# Patient Record
Sex: FEMALE | Race: WHITE | Employment: OTHER | URBAN - METROPOLITAN AREA
[De-identification: names, ages, dates, MRNs, and addresses within clinical notes are randomized per-mention and may not be internally consistent; named-entity substitution may affect disease eponyms.]

---

## 2017-05-14 ENCOUNTER — OFFICE VISIT (OUTPATIENT)
Dept: URGENT CARE | Facility: RETAIL CLINIC | Age: 52
End: 2017-05-14
Payer: COMMERCIAL

## 2017-05-14 ENCOUNTER — TELEPHONE (OUTPATIENT)
Dept: NURSING | Facility: CLINIC | Age: 52
End: 2017-05-14

## 2017-05-14 ENCOUNTER — TELEPHONE (OUTPATIENT)
Dept: FAMILY MEDICINE | Facility: CLINIC | Age: 52
End: 2017-05-14

## 2017-05-14 VITALS
DIASTOLIC BLOOD PRESSURE: 105 MMHG | OXYGEN SATURATION: 100 % | SYSTOLIC BLOOD PRESSURE: 149 MMHG | TEMPERATURE: 98.7 F | HEART RATE: 90 BPM

## 2017-05-14 DIAGNOSIS — J20.9 ACUTE BRONCHITIS WITH COEXISTING CONDITION REQUIRING PROPHYLACTIC TREATMENT: Primary | ICD-10-CM

## 2017-05-14 PROCEDURE — 99213 OFFICE O/P EST LOW 20 MIN: CPT | Performed by: PHYSICIAN ASSISTANT

## 2017-05-14 RX ORDER — CEFDINIR 300 MG/1
600 CAPSULE ORAL DAILY
Qty: 20 CAPSULE | Refills: 0 | Status: SHIPPED | OUTPATIENT
Start: 2017-05-14 | End: 2017-05-24

## 2017-05-14 NOTE — PROGRESS NOTES
Chief Complaint   Patient presents with     Ent Problem     plugged; pt is      Cough     tight chest     Nasal Congestion     stuffy        SUBJECTIVE:  Lorna Gorman is a 51 year old female who presents to the clinic today with a chief complaint of congestion and cough  for 5 day(s).  Her cough is described as persistent, daytime and nightime.    The patient's symptoms are moderate and worsening.  Associated symptoms include ears plugged, congestion, drainage, coughing The patient's symptoms are exacerbated by no particular triggers  Patient has been using sudafed behind pharmacy counter, ibuprofen, mucinex  to improve symptoms.    Past Medical History:   Diagnosis Date     GENERALIZED ANXIETY DIS 5/15/2007     Hyperlipidemia with target LDL less than 130 11/6/2013     Major depressive disorder, recurrent episode, mild (H) 12/9/2015     Migraine, unspecified, without mention of intractable migraine without mention of status migrainosus      Moderate Depression [296.32] 8/19/2009     Premenstrual tension syndromes     PMDS on [place] and doing very well.     Tobacco abuse 11/6/2013     Current Outpatient Prescriptions   Medication Sig Dispense Refill     sertraline (ZOLOFT) 50 MG tablet TAKE 0.5 TABLETS (25 MG) BY MOUTH DAILY (Patient not taking: Reported on 5/14/2017) 15 tablet 0     ASPIRIN PO Take 325 mg by mouth Reported on 5/14/2017       albuterol (PROAIR HFA, PROVENTIL HFA, VENTOLIN HFA) 108 (90 BASE) MCG/ACT inhaler Inhale 2 puffs into the lungs every 4 hours as needed (Patient not taking: Reported on 5/14/2017) 1 Inhaler 1        Allergies   Allergen Reactions     No Known Allergies      Ragweeds         History   Smoking Status     Current Every Day Smoker     Packs/day: 0.25     Years: 10.00     Last attempt to quit: 11/25/2015   Smokeless Tobacco     Never Used       ROS  Review of systems negative except as stated above.    OBJECTIVE:  BP (!) 149/105 (BP Location: Left arm)  Pulse 90   Temp 98.7  F (37.1  C) (Oral)  SpO2 100%  GENERAL APPEARANCE: mildly ill appearing  EYES: conjunctiva clear  HENT: ear canals clear. Bilateral TMs serous effusion  Nose congested. mouth without ulcers, erythema or lesions  NECK: supple, nontender, no lymphadenopathy  RESP:exp rhonchi present in upper lobes, no wheezing or rales  CV: regular rates and rhythm, normal S1 S2, no murmur noted  SKIN: no suspicious lesions or rashes    ASSESSMENT:    (J20.9) Acute bronchitis with coexisting condition requiring prophylactic treatment      PLAN:   cefdinir (OMNICEF) 300 MG capsule  Take antibiotic as directed  Offered inhaler, patient declined  Fluids, rest, cough drops, cough suppressants, humidifier, over the counter pain relievers as needed  Please follow up with primary care provider if not improving, worsening or new symptoms or for any adverse reactions to medications.   May need chest xray if persistent symptoms.     Starla Whitley PA-C  [facility]

## 2017-05-14 NOTE — NURSING NOTE
"Chief Complaint   Patient presents with     Ent Problem     plugged; pt is      Cough     tight chest     Nasal Congestion     stuffy       Initial BP (!) 149/105 (BP Location: Left arm)  Pulse 90  Temp 98.7  F (37.1  C) (Oral)  SpO2 100% Estimated body mass index is 18.32 kg/(m^2) as calculated from the following:    Height as of 12/17/16: 5' 7\" (1.702 m).    Weight as of 12/17/16: 117 lb (53.1 kg).  Medication Reconciliation: complete  "

## 2017-05-14 NOTE — TELEPHONE ENCOUNTER
"Call Type: Triage Call    Presenting Problem: \"blister like rash on ribcage aftere starting my  antibiotic this morning.\"  Triage Note:  Guideline Title: Rash ; Rash  Recommended Disposition: Call Provider within 8 Hours  Original Inclination: Wanted to speak with a nurse  Override Disposition: See Provider within 4 hours  Intended Action: See Dr/Manuel Appt  Physician Contacted: No  New onset of rash after beginning new prescribed, nonprescribed, or  alternative/complementary medication within last 10 days ?  YES  Pregnant AND possible exposure to rubella ? NO  Severe breathing problems ? NO  Breathing problems ? NO  Known or suspected exposure to Poison [place], [place] OR [place] ? NO  New or worsening signs and symptoms that may indicate shock ? NO  Previously confirmed diagnosis of athlete's foot and similar symptoms ? NO  Known herpes zoster or undiagnosed blister-like rash on or near eye or on tip of  nose ? NO  New rash/eruptions AND any temperature elevation in an immunocompromised  individual or frail elderly ? NO  Signs/symptoms of anaphylaxis ? NO  Possible exposure to chickenpox or has rash that looks like chickenpox ? NO  Pregnant and rash ? NO  Known insect bite or sting ? NO  Skin changes that looks like hives (itchy welts or wheals) ? NO  History of severe reaction after previous similar exposure to known allergen ? NO  Unexplained blood-colored (purple or red) flat pinpoint dots, spots or patches on  the skin ? NO  Foot or toe signs or symptoms complicated by diagnosed diabetes mellitus. ? NO  Any new OR worsening signs and symptoms of soft tissue infection ? NO  Physician Instructions:  Care Advice: Call  if develop signs and symptoms of anaphylaxis  within minutes to several hours of exposure: severe difficulty breathing  rapid, weak or irregular pulse  pruritus, urticaria, swelling of face, lips, tongue, or throat causing  tightness or difficulty swallowing  abdominal cramping, nausea, vomiting or " diarrhea.

## 2017-05-14 NOTE — MR AVS SNAPSHOT
After Visit Summary   5/14/2017    Lorna Gorman    MRN: [id number]           Patient Information     Date Of Birth          1965        Visit Information        Provider Department      5/14/2017 9:10 AM Starla Whitley PA-C [facility]        Today's Diagnoses     Acute bronchitis with coexisting condition requiring prophylactic treatment    -  1       Follow-ups after your visit        Who to contact     You can reach your care team any time of the day by calling [phone].  Notification of test results:  If you have an abnormal lab result, we will notify you by phone as soon as possible.         Additional Information About Your Visit        MyChart Information     [facility] gives you secure access to your electronic health record. If you see a primary care provider, you can also send messages to your care team and make appointments. If you have questions, please call your primary care clinic.  If you do not have a primary care provider, please call [phone] and they will assist you.        Care EveryWhere ID     This is your Care EveryWhere ID. This could be used by other organizations to access your [place] medical records  [id number]        Your Vitals Were     Pulse Temperature Pulse Oximetry             90 98.7  F (37.1  C) (Oral) 100%          Blood Pressure from Last 3 Encounters:   05/14/17 (!) 149/105   12/17/16 (!) 139/92   12/09/15 118/80    Weight from Last 3 Encounters:   12/17/16 117 lb (53.1 kg)   12/09/15 118 lb 4 oz (53.6 kg)   05/14/15 128 lb 3.2 oz (58.2 kg)              Today, you had the following     No orders found for display         Today's Medication Changes          These changes are accurate as of: 5/14/17 10:20 AM.  If you have any questions, ask your nurse or doctor.               Start taking these medicines.        Dose/Directions    cefdinir 300 MG capsule   Commonly known as:  OMNICEF   Used for:  Acute bronchitis with  coexisting condition requiring prophylactic treatment        Dose:  600 mg   Take 2 capsules (600 mg) by mouth daily for 10 days   Quantity:  20 capsule   Refills:  0            Where to get your medicines      These medications were sent to [id number] - [place], [place] - [address]  [address], [facility] [address]     Phone:  [phone]     cefdinir 300 MG capsule                Primary Care Provider Office Phone # Fax #    Galindo Esparza [phone]       [facility] [address] [place] [address]        Thank you!     Thank you for choosing [facility]  for your care. Our goal is always to provide you with excellent care. Hearing back from our patients is one way we can continue to improve our services. Please take a few minutes to complete the written survey that you may receive in the mail after your visit with us. Thank you!             Your Updated Medication List - Protect others around you: Learn how to safely use, store and throw away your medicines at www.disposemymeds.org.          This list is accurate as of: 5/14/17 10:20 AM.  Always use your most recent med list.                   Brand Name Dispense Instructions for use    ASPIRIN PO      Take 325 mg by mouth Reported on 5/14/2017       cefdinir 300 MG capsule    OMNICEF    20 capsule    Take 2 capsules (600 mg) by mouth daily for 10 days

## 2017-05-15 ENCOUNTER — OFFICE VISIT (OUTPATIENT)
Dept: FAMILY MEDICINE | Facility: OTHER | Age: 52
End: 2017-05-15
Payer: COMMERCIAL

## 2017-05-15 VITALS
TEMPERATURE: 98.4 F | OXYGEN SATURATION: 98 % | DIASTOLIC BLOOD PRESSURE: 84 MMHG | HEIGHT: 67 IN | RESPIRATION RATE: 14 BRPM | HEART RATE: 98 BPM | SYSTOLIC BLOOD PRESSURE: 132 MMHG | WEIGHT: 115 LBS | BODY MASS INDEX: 18.05 KG/M2

## 2017-05-15 DIAGNOSIS — Z11.59 NEED FOR HEPATITIS C SCREENING TEST: ICD-10-CM

## 2017-05-15 DIAGNOSIS — B02.9 HERPES ZOSTER WITHOUT COMPLICATION: Primary | ICD-10-CM

## 2017-05-15 DIAGNOSIS — F17.200 TOBACCO USE DISORDER: ICD-10-CM

## 2017-05-15 PROCEDURE — 99213 OFFICE O/P EST LOW 20 MIN: CPT | Performed by: FAMILY MEDICINE

## 2017-05-15 RX ORDER — VALACYCLOVIR HYDROCHLORIDE 1 G/1
1000 TABLET, FILM COATED ORAL 3 TIMES DAILY
Qty: 21 TABLET | Refills: 0 | Status: SHIPPED | OUTPATIENT
Start: 2017-05-15 | End: 2017-06-22

## 2017-05-15 ASSESSMENT — ANXIETY QUESTIONNAIRES
GAD7 TOTAL SCORE: 3
7. FEELING AFRAID AS IF SOMETHING AWFUL MIGHT HAPPEN: 0 = NOT AT ALL
GAD7 TOTAL SCORE: 3

## 2017-05-15 ASSESSMENT — PATIENT HEALTH QUESTIONNAIRE - PHQ9
10. IF YOU CHECKED OFF ANY PROBLEMS, HOW DIFFICULT HAVE THESE PROBLEMS MADE IT FOR YOU TO DO YOUR WORK, TAKE CARE OF THINGS AT HOME, OR GET ALONG WITH OTHER PEOPLE: NOT DIFFICULT AT ALL
SUM OF ALL RESPONSES TO PHQ QUESTIONS 1-9: 1

## 2017-05-15 ASSESSMENT — PAIN SCALES - GENERAL: PAINLEVEL: NO PAIN (0)

## 2017-05-15 NOTE — PATIENT INSTRUCTIONS
Shingles  Shingles is a viral infection caused by the same virus as chicken pox. Anyone who has had chicken pox may get shingles later in life. The virus stays in the body, but remains dormant (asleep). Shingles often occurs in older persons or persons with lowered immunity. But it can affect anyone at any age.  Shingles starts as a tingling patch of skin on one side of the body. Small, painful blisters may then appear. The rash does not spread to the rest of the body.  Exposure to shingles cannot cause shingles. However, it can cause chicken pox in anyone who has not had chicken pox or has not been vaccinated. The contagious period ends when all blisters have crusted over (generally about 2 weeks after the illness begins).  After the blisters heal, the affected skin may be sensitive or painful for months (neuralgia). This often gradually goes away.  A shingles vaccine is available. This can help prevent shingles or make it less painful. It is generally recommended for adults over the age of 60 who have had chicken pox in the past, but who have never had shingles. Adults over 60 who have had neither chicken pox nor shingles can prevent both diseases with the chicken pox vaccine. Ask your healthcare provider about these vaccines.  Home care    Medicines may be prescribed to help relieve pain. Take these medicines as directed. Ask your healthcare provider or pharmacist before using over-the-counter medicines for helping treat pain and itching.     In certain cases, antiviral medicines may be prescribed to reduce pain, shorten the illness, and prevent neuralgia. Take these medicines as directed.    Compresses made from a solution of cool water mixed with cornstarch or baking soda may help relieve pain and itching.     Gently wash skin daily with soap and water to help prevent infection.  Be certain to rinse off all of the soap, which can be irritating.    Trim fingernails and try not to scratch. Scratching the sores  may leave scars.    Stay home from work or school until all blisters have formed a crust and you are no longer contagious.  Follow-up care  Follow up with your healthcare provider or as directed by our staff.  When to seek medical advice    Fever of 100.4 F (38 C) or higher, or as directed by your healthcare provider    Affected skin is on the face or neck and any of the following occur:                          Headache                          Eye pain                          Changes in vision                          Sores near the eye                          Weakness of facial muscles    Pain, redness, or swelling of a joint    Signs of skin infection: colored drainage from the sores, warmth, increasing redness, or increasing pain    8364-0121 [employer]. [address] [place] [address] 92001. All rights reserved. This information is not intended as a substitute for professional medical care. Always follow your healthcare professional's instructions.

## 2017-05-15 NOTE — PROGRESS NOTES
SUBJECTIVE:                                                    Lorna Gorman is a 51 year old female who presents to clinic today for the following health issues:      HPI    Rash     Onset: x3 days    Description:   Location: under arms/rib cage  Character: blotchy, raised, red  Itching (Pruritis): YES    Progression of Symptoms:  worsening    Accompanying Signs & Symptoms:  Fever: Yes- Hot last night  Body aches or joint pain: no   Sore throat symptoms: no   Recent cold symptoms: Yes- currently has double ear infection & bronchitis    History:   Previous similar rash: no     Precipitating factors:   Exposure to similar rash: no   New exposures: None and medication Cefdinir 300 MG  Recent travel: YES- Saturday returned from [place]    Alleviating factors:  no     Therapies Tried and outcome: No      Problem list and histories reviewed & adjusted, as indicated.  Additional history: as documented        Patient Active Problem List   Diagnosis     Allergic rhinitis     Tobacco abuse     Wrist injury     CARDIOVASCULAR SCREENING; LDL GOAL LESS THAN 160     TITA (generalized anxiety disorder)     Herpes zoster without complication     Past Surgical History:   Procedure Laterality Date     C ANESTH,NOSE,SINUS SURGERY       C RESEC HEAD OF PHALANX,TOE       COLONOSCOPY N/A 2015    Procedure: COLONOSCOPY;  Surgeon: Ritesh Chiu MD;  Location: [facility] HYSTEROSCOPY, SURGICAL; W/ ENDOMETRIAL ABLATION, ANY METHOD  09       Social History   Substance Use Topics     Smoking status: Current Some Day Smoker     Packs/day: 0.25     Years: 10.00     Last attempt to quit: 2015     Smokeless tobacco: Never Used     Alcohol use Yes      Comment: ocassionally     Family History   Problem Relation Age of Onset     Myocardial Infarction Father 40      at age 40     Hypertension Father      Lipids Father      C.A.D. Father       at age 40 from an MI     Arthritis Mother      Eye Disorder Mother       "glaucoma     Genitourinary Problems Mother      hysterectomy     Hypertension Mother      Cardiovascular Maternal Grandmother      Eye Disorder Maternal Grandmother      cataracts     HEART DISEASE Maternal Grandmother      Hypertension Maternal Grandmother      Cardiovascular Maternal Grandfather      Hypertension Maternal Grandfather      Cardiovascular Paternal Grandmother      Hypertension Paternal Grandmother      Cardiovascular Paternal Grandfather      Hypertension Paternal Grandfather      Musculoskeletal Disorder Paternal Uncle      MS         Current Outpatient Prescriptions   Medication Sig Dispense Refill     valACYclovir (VALTREX) 1000 mg tablet Take 1 tablet (1,000 mg) by mouth 3 times daily 21 tablet 0     cefdinir (OMNICEF) 300 MG capsule Take 2 capsules (600 mg) by mouth daily for 10 days 20 capsule 0     ASPIRIN PO Take 325 mg by mouth Reported on 5/15/2017       Allergies   Allergen Reactions     No Known Allergies      Ragweeds      BP Readings from Last 3 Encounters:   05/15/17 132/84   05/14/17 (!) 149/105   12/17/16 (!) 139/92    Wt Readings from Last 3 Encounters:   05/15/17 115 lb (52.2 kg)   12/17/16 117 lb (53.1 kg)   12/09/15 118 lb 4 oz (53.6 kg)                  Labs reviewed in EPIC    ROS:  Constitutional, HEENT, cardiovascular, pulmonary, gi and gu systems are negative, except as otherwise noted.    OBJECTIVE:                                                    /84 (BP Location: Left arm, Patient Position: Chair, Cuff Size: Adult Regular)  Pulse 98  Temp 98.4  F (36.9  C) (Oral)  Resp 14  Ht 5' 7.1\" (1.704 m)  Wt 115 lb (52.2 kg)  SpO2 98%  BMI 17.96 kg/m2  Body mass index is 17.96 kg/(m^2).  Physical Exam   Constitutional: She appears well-developed and well-nourished.   HENT:   Head: Normocephalic and atraumatic.   Nose: Nose normal.   Mouth/Throat: No oropharyngeal exudate.   Mild erythematous TM  Left >right    Eyes: EOM are normal.   Cardiovascular: Normal rate and " regular rhythm.    Pulmonary/Chest: Effort normal and breath sounds normal.   Skin: Rash noted.   Rash - looks like excoriations but on close examination there are blister consistent with shingles. Involving 3 different dermatomes .          Diagnostic Test Results:  none      ASSESSMENT/PLAN:                                                      Problem List Items Addressed This Visit     Herpes zoster without complication - Primary     Involving 3 different dermatomes  She denies having any risk for HIV or being on any other immunosuppresents  She has been sick lately with a double ear infection and bronchitis which could may be explain the multidermatomal distribution   She is uptodat on cancer screening   Will treat with valtrex  Can continue cefdinir as I do not this is an allergic reaction to it  Discussed home care  Reportable signs and symptoms discussed  RTC if symptoms persist or fail to improve           Relevant Medications    valACYclovir (VALTREX) 1000 mg tablet      Other Visit Diagnoses     Need for hepatitis C screening test        Tobacco use disorder        Relevant Orders    TOBACCO CESSATION ORDER FOR  (Completed)             Shirley Hanson MD  [facility]      Answers for HPI/ROS submitted by the patient on 5/15/2017   If you checked off any problems, how difficult have these problems made it for you to do your work, take care of things at home, or get along with other people?: Not difficult at all  PHQ9 TOTAL SCORE: 1  TITA 7 TOTAL SCORE: 3

## 2017-05-15 NOTE — MR AVS SNAPSHOT
After Visit Summary   5/15/2017    Lorna Gorman    MRN: [id number]           Patient Information     Date Of Birth          1965        Visit Information        Provider Department      5/15/2017 10:20 AM Shirley Hanson MD [facility]        Today's Diagnoses     Herpes zoster without complication    -  1    Need for hepatitis C screening test        Tobacco use disorder          Care Instructions      Shingles  Shingles is a viral infection caused by the same virus as chicken pox. Anyone who has had chicken pox may get shingles later in life. The virus stays in the body, but remains dormant (asleep). Shingles often occurs in older persons or persons with lowered immunity. But it can affect anyone at any age.  Shingles starts as a tingling patch of skin on one side of the body. Small, painful blisters may then appear. The rash does not spread to the rest of the body.  Exposure to shingles cannot cause shingles. However, it can cause chicken pox in anyone who has not had chicken pox or has not been vaccinated. The contagious period ends when all blisters have crusted over (generally about 2 weeks after the illness begins).  After the blisters heal, the affected skin may be sensitive or painful for months (neuralgia). This often gradually goes away.  A shingles vaccine is available. This can help prevent shingles or make it less painful. It is generally recommended for adults over the age of 60 who have had chicken pox in the past, but who have never had shingles. Adults over 60 who have had neither chicken pox nor shingles can prevent both diseases with the chicken pox vaccine. Ask your healthcare provider about these vaccines.  Home care    Medicines may be prescribed to help relieve pain. Take these medicines as directed. Ask your healthcare provider or pharmacist before using over-the-counter medicines for helping treat pain and itching.     In certain cases, antiviral  medicines may be prescribed to reduce pain, shorten the illness, and prevent neuralgia. Take these medicines as directed.    Compresses made from a solution of cool water mixed with cornstarch or baking soda may help relieve pain and itching.     Gently wash skin daily with soap and water to help prevent infection.  Be certain to rinse off all of the soap, which can be irritating.    Trim fingernails and try not to scratch. Scratching the sores may leave scars.    Stay home from work or school until all blisters have formed a crust and you are no longer contagious.  Follow-up care  Follow up with your healthcare provider or as directed by our staff.  When to seek medical advice    Fever of 100.4 F (38 C) or higher, or as directed by your healthcare provider    Affected skin is on the face or neck and any of the following occur:                          Headache                          Eye pain                          Changes in vision                          Sores near the eye                          Weakness of facial muscles    Pain, redness, or swelling of a joint    Signs of skin infection: colored drainage from the sores, warmth, increasing redness, or increasing pain    [id number] [employer]. [address] [place] [address]. All rights reserved. This information is not intended as a substitute for professional medical care. Always follow your healthcare professional's instructions.              Follow-ups after your visit        Who to contact     If you have questions or need follow up information about today's clinic visit or your schedule please contact [facility] directly at [phone].  Normal or non-critical lab and imaging results will be communicated to you by MyChart, letter or phone within 4 business days after the clinic has received the results. If you do not hear from us within 7 days, please contact the clinic through MyChart or phone. If you have a  "critical or abnormal lab result, we will notify you by phone as soon as possible.  Submit refill requests through [employer] or call your pharmacy and they will forward the refill request to us. Please allow 3 business days for your refill to be completed.          Additional Information About Your Visit        SnapAppointmentshart Information     [employer] gives you secure access to your electronic health record. If you see a primary care provider, you can also send messages to your care team and make appointments. If you have questions, please call your primary care clinic.  If you do not have a primary care provider, please call [phone] and they will assist you.        Care EveryWhere ID     This is your Care EveryWhere ID. This could be used by other organizations to access your [facility] medical records  [id number]        Your Vitals Were     Pulse Temperature Respirations Height Pulse Oximetry BMI (Body Mass Index)    98 98.4  F (36.9  C) (Oral) 14 5' 7.1\" (1.704 m) 98% 17.96 kg/m2       Blood Pressure from Last 3 Encounters:   05/15/17 132/84   05/14/17 (!) 149/105   12/17/16 (!) 139/92    Weight from Last 3 Encounters:   05/15/17 115 lb (52.2 kg)   12/17/16 117 lb (53.1 kg)   12/09/15 118 lb 4 oz (53.6 kg)              We Performed the Following     TOBACCO CESSATION ORDER FOR [facility]          Today's Medication Changes          These changes are accurate as of: 5/15/17 10:42 AM.  If you have any questions, ask your nurse or doctor.               Start taking these medicines.        Dose/Directions    valACYclovir 1000 mg tablet   Commonly known as:  VALTREX   Used for:  Herpes zoster without complication   Started by:  Shirley Hanson MD        Dose:  1000 mg   Take 1 tablet (1,000 mg) by mouth 3 times daily   Quantity:  21 tablet   Refills:  0            Where to get your medicines      These medications were sent to [facility] [address] [facility] [address] [address] [address]     " Phone:  [phone]     valACYclovir 1000 mg tablet                Primary Care Provider Office Phone # Fax #    Galindo Esparza [phone]       [facility] [address] [place] [address]        Thank you!     Thank you for choosing [facility]  for your care. Our goal is always to provide you with excellent care. Hearing back from our patients is one way we can continue to improve our services. Please take a few minutes to complete the written survey that you may receive in the mail after your visit with us. Thank you!             Your Updated Medication List - Protect others around you: Learn how to safely use, store and throw away your medicines at www.disposemymeds.org.          This list is accurate as of: 5/15/17 10:42 AM.  Always use your most recent med list.                   Brand Name Dispense Instructions for use    ASPIRIN PO      Take 325 mg by mouth Reported on 5/15/2017       cefdinir 300 MG capsule    OMNICEF    20 capsule    Take 2 capsules (600 mg) by mouth daily for 10 days       valACYclovir 1000 mg tablet    VALTREX    21 tablet    Take 1 tablet (1,000 mg) by mouth 3 times daily

## 2017-05-15 NOTE — LETTER
My Depression Action Plan  Name: Lorna Gorman   Date of Birth 1965  Date: 5/15/2017    My doctor: Galindo Esparza   My clinic: [address]  [facility] [address] [phone]          GREEN    ZONE   Good Control    What it looks like:     Things are going generally well. You have normal up s and down s. You may even feel depressed from time to time, but bad moods usually last less than a day.   What you need to do:  1. Continue to care for yourself (see self care plan)  2. Check your depression survival kit and update it as needed  3. Follow your physician s recommendations including any medication.  4. Do not stop taking medication unless you consult with your physician first.           YELLOW         ZONE Getting Worse    What it looks like:     Depression is starting to interfere with your life.     It may be hard to get out of bed; you may be starting to isolate yourself from others.    Symptoms of depression are starting to last most all day and this has happened for several days.     You may have suicidal thoughts but they are not constant.   What you need to do:     1. Call your care team, your response to treatment will improve if you keep your care team informed of your progress. Yellow periods are signs an adjustment may need to be made.     2. Continue your self-care, even if you have to fake it!    3. Talk to someone in your support network    4. Open up your depression survival kit           RED    ZONE Medical Alert - Get Help    What it looks like:     Depression is seriously interfering with your life.     You may experience these or other symptoms: You can t get out of bed most days, can t work or engage in other necessary activities, you have trouble taking care of basic hygiene, or basic responsibilities, thoughts of suicide or death that will not go away, self-injurious behavior.     What you need to do:  1. Call your care team  and request a same-day appointment. If they are not available (weekends or after hours) call your local crisis line, emergency room or 911.      Electronically signed by: Marylin Holt, May 15, 2017    Depression Self Care Plan / Survival Kit    Self-Care for Depression  Here s the deal. Your body and mind are really not as separate as most people think.  What you do and think affects how you feel and how you feel influences what you do and think. This means if you do things that people who feel good do, it will help you feel better.  Sometimes this is all it takes.  There is also a place for medication and therapy depending on how severe your depression is, so be sure to consult with your medical provider and/ or Behavioral Health Consultant if your symptoms are worsening or not improving.     In order to better manage my stress, I will:    Exercise  Get some form of exercise, every day. This will help reduce pain and release endorphins, the  feel good  chemicals in your brain. This is almost as good as taking antidepressants!  This is not the same as joining a gym and then never going! (they count on that by the way ) It can be as simple as just going for a walk or doing some gardening, anything that will get you moving.      Hygiene   Maintain good hygiene (Get out of bed in the morning, Make your bed, Brush your teeth, Take a shower, and Get dressed like you were going to work, even if you are unemployed).  If your clothes don't fit try to get ones that do.    Diet  I will strive to eat foods that are good for me, drink plenty of water, and avoid excessive sugar, caffeine, alcohol, and other mood-altering substances.  Some foods that are helpful in depression are: complex carbohydrates, B vitamins, flaxseed, fish or fish oil, fresh fruits and vegetables.    Psychotherapy  I agree to participate in Individual Therapy (if recommended).    Medication  If prescribed medications, I agree to take them.  Missing  doses can result in serious side effects.  I understand that drinking alcohol, or other illicit drug use, may cause potential side effects.  I will not stop my medication abruptly without first discussing it with my provider.    Staying Connected With Others  I will stay in touch with my friends, family members, and my primary care provider/team.    Use your imagination  Be creative.  We all have a creative side; it doesn t matter if it s oil painting, sand castles, or mud pies! This will also kick up the endorphins.    Witness Beauty  (AKA stop and smell the roses) Take a look outside, even in mid-winter. Notice colors, textures. Watch the squirrels and birds.     Service to others  Be of service to others.  There is always someone else in need.  By helping others we can  get out of ourselves  and remember the really important things.  This also provides opportunities for practicing all the other parts of the program.    Humor  Laugh and be silly!  Adjust your TV habits for less news and crime-drama and more comedy.    Control your stress  Try breathing deep, massage therapy, biofeedback, and meditation. Find time to relax each day.     My support system    Clinic Contact:  Phone number:    Contact 1:  Phone number:    Contact 2:  Phone number:    Worship/:  Phone number:    Therapist:  Phone number:    Local crisis center:    Phone number:    Other community support:  Phone number:

## 2017-05-15 NOTE — ASSESSMENT & PLAN NOTE
Involving 3 different dermatomes  She denies having any risk for HIV or being on any other immunosuppresents  She has been sick lately with a double ear infection and bronchitis which could may be explain the multidermatomal distribution   She is uptodat on cancer screening   Will treat with valtrex  Can continue cefdinir as I do not this is an allergic reaction to it  Discussed home care  Reportable signs and symptoms discussed  RTC if symptoms persist or fail to improve

## 2017-05-15 NOTE — NURSING NOTE
"Chief Complaint   Patient presents with     Rash     Health Maintenance     phq9, depression action plan, hep c, tobacco       Initial /84 (BP Location: Left arm, Patient Position: Chair, Cuff Size: Adult Regular)  Pulse 98  Temp 98.4  F (36.9  C) (Oral)  Resp 14  Ht 5' 7.1\" (1.704 m)  Wt 115 lb (52.2 kg)  SpO2 98%  BMI 17.96 kg/m2 Estimated body mass index is 17.96 kg/(m^2) as calculated from the following:    Height as of this encounter: 5' 7.1\" (1.704 m).    Weight as of this encounter: 115 lb (52.2 kg).  Medication Reconciliation: complete   Marylin Nguyen CMA (AAMA)      "

## 2017-05-16 ASSESSMENT — ANXIETY QUESTIONNAIRES: GAD7 TOTAL SCORE: 3

## 2017-05-16 ASSESSMENT — PATIENT HEALTH QUESTIONNAIRE - PHQ9: SUM OF ALL RESPONSES TO PHQ QUESTIONS 1-9: 1

## 2017-05-18 ENCOUNTER — OFFICE VISIT (OUTPATIENT)
Dept: FAMILY MEDICINE | Facility: CLINIC | Age: 52
End: 2017-05-18
Payer: COMMERCIAL

## 2017-05-18 ENCOUNTER — TELEPHONE (OUTPATIENT)
Dept: FAMILY MEDICINE | Facility: CLINIC | Age: 52
End: 2017-05-18

## 2017-05-18 VITALS
SYSTOLIC BLOOD PRESSURE: 158 MMHG | DIASTOLIC BLOOD PRESSURE: 98 MMHG | HEIGHT: 67 IN | TEMPERATURE: 99 F | RESPIRATION RATE: 24 BRPM | WEIGHT: 118 LBS | HEART RATE: 110 BPM | BODY MASS INDEX: 18.52 KG/M2

## 2017-05-18 VITALS
OXYGEN SATURATION: 99 % | DIASTOLIC BLOOD PRESSURE: 88 MMHG | WEIGHT: 118 LBS | TEMPERATURE: 99 F | BODY MASS INDEX: 18.52 KG/M2 | HEIGHT: 67 IN | HEART RATE: 80 BPM | SYSTOLIC BLOOD PRESSURE: 139 MMHG | RESPIRATION RATE: 16 BRPM

## 2017-05-18 DIAGNOSIS — I10 BENIGN ESSENTIAL HYPERTENSION: Primary | ICD-10-CM

## 2017-05-18 DIAGNOSIS — F41.1 GAD (GENERALIZED ANXIETY DISORDER): ICD-10-CM

## 2017-05-18 DIAGNOSIS — R03.0 ELEVATED BLOOD PRESSURE READING WITHOUT DIAGNOSIS OF HYPERTENSION: ICD-10-CM

## 2017-05-18 DIAGNOSIS — H66.003 ACUTE SUPPURATIVE OTITIS MEDIA OF BOTH EARS WITHOUT SPONTANEOUS RUPTURE OF TYMPANIC MEMBRANES, RECURRENCE NOT SPECIFIED: Primary | ICD-10-CM

## 2017-05-18 DIAGNOSIS — Z72.0 TOBACCO ABUSE: ICD-10-CM

## 2017-05-18 DIAGNOSIS — B02.9 HERPES ZOSTER WITHOUT COMPLICATION: ICD-10-CM

## 2017-05-18 DIAGNOSIS — J20.9 ACUTE BRONCHITIS, UNSPECIFIED ORGANISM: ICD-10-CM

## 2017-05-18 PROCEDURE — 99213 OFFICE O/P EST LOW 20 MIN: CPT | Performed by: FAMILY MEDICINE

## 2017-05-18 PROCEDURE — 99213 OFFICE O/P EST LOW 20 MIN: CPT | Performed by: PHYSICIAN ASSISTANT

## 2017-05-18 RX ORDER — ALBUTEROL SULFATE 90 UG/1
2 AEROSOL, METERED RESPIRATORY (INHALATION) EVERY 6 HOURS PRN
Qty: 1 INHALER | Refills: 0 | Status: SHIPPED | OUTPATIENT
Start: 2017-05-18 | End: 2019-01-30

## 2017-05-18 RX ORDER — LISINOPRIL 10 MG/1
10 TABLET ORAL DAILY
Qty: 90 TABLET | Refills: 1 | Status: SHIPPED | OUTPATIENT
Start: 2017-05-18 | End: 2017-06-22 | Stop reason: SINTOL

## 2017-05-18 ASSESSMENT — PAIN SCALES - GENERAL
PAINLEVEL: NO PAIN (0)
PAINLEVEL: NO PAIN (0)

## 2017-05-18 NOTE — MR AVS SNAPSHOT
After Visit Summary   5/18/2017    Lorna Gorman    MRN: [id number]           Patient Information     Date Of Birth          1965        Visit Information        Provider Department      5/18/2017 12:20 PM Leilani Segal PA-C [facility] [place]        Today's Diagnoses     Acute suppurative otitis media of both ears without spontaneous rupture of tympanic membranes, recurrence not specified    -  1    Herpes zoster without complication        Tobacco abuse        Acute bronchitis, unspecified organism          Care Instructions    No sudafed- may be factor in the high blood pressure    Pressure was high at the [facility] also though    Finish both the valtrex and the cefdinir     refilled the albuterol inhaler        Follow-ups after your visit        Who to contact     If you have questions or need follow up information about today's clinic visit or your schedule please contact [facility] [place] directly at [phone].  Normal or non-critical lab and imaging results will be communicated to you by MyChart, letter or phone within 4 business days after the clinic has received the results. If you do not hear from us within 7 days, please contact the clinic through Mygenihart or phone. If you have a critical or abnormal lab result, we will notify you by phone as soon as possible.  Submit refill requests through [employer] or call your pharmacy and they will forward the refill request to us. Please allow 3 business days for your refill to be completed.          Additional Information About Your Visit        MyChart Information     [employer] gives you secure access to your electronic health record. If you see a primary care provider, you can also send messages to your care team and make appointments. If you have questions, please call your primary care clinic.  If you do not have a primary care provider, please call [phone] and they will assist you.        Care EveryWhere ID     This  "is your Care EveryWhere ID. This could be used by other organizations to access your [facility] medical records  [id number]        Your Vitals Were     Pulse Temperature Respirations Height Pulse Oximetry BMI (Body Mass Index)    80 99  F (37.2  C) (Temporal) 16 5' 6.65\" (1.693 m) 99% 18.67 kg/m2       Blood Pressure from Last 3 Encounters:   05/18/17 139/88   05/15/17 132/84   05/14/17 (!) 149/105    Weight from Last 3 Encounters:   05/18/17 118 lb (53.5 kg)   05/15/17 115 lb (52.2 kg)   12/17/16 117 lb (53.1 kg)              Today, you had the following     No orders found for display         Today's Medication Changes          These changes are accurate as of: 5/18/17 12:38 PM.  If you have any questions, ask your nurse or doctor.               Start taking these medicines.        Dose/Directions    albuterol 108 (90 BASE) MCG/ACT Inhaler   Commonly known as:  PROAIR HFA/PROVENTIL HFA/VENTOLIN HFA   Used for:  Acute bronchitis, unspecified organism, Tobacco abuse   Started by:  Leilani Segal PA-C        Dose:  2 puff   Inhale 2 puffs into the lungs every 6 hours as needed for shortness of breath / dyspnea or wheezing   Quantity:  1 Inhaler   Refills:  0            Where to get your medicines      These medications were sent to [facility] [address] [place] [address]     Phone:  [phone]     albuterol 108 (90 BASE) MCG/ACT Inhaler                Primary Care Provider Office Phone # Fax #    Galindo Esparza [phone]       [place] [id number] [place] [address]        Thank you!     Thank you for choosing [facility]  for your care. Our goal is always to provide you with excellent care. Hearing back from our patients is one way we can continue to improve our services. Please take a few minutes to complete the written survey that you may receive in the mail after your visit with us. " Thank you!             Your Updated Medication List - Protect others around you: Learn how to safely use, store and throw away your medicines at www.disposemymeds.org.          This list is accurate as of: 5/18/17 12:38 PM.  Always use your most recent med list.                   Brand Name Dispense Instructions for use    albuterol 108 (90 BASE) MCG/ACT Inhaler    PROAIR HFA/PROVENTIL HFA/VENTOLIN HFA    1 Inhaler    Inhale 2 puffs into the lungs every 6 hours as needed for shortness of breath / dyspnea or wheezing       ASPIRIN PO      Take 325 mg by mouth Reported on 5/18/2017       cefdinir 300 MG capsule    OMNICEF    20 capsule    Take 2 capsules (600 mg) by mouth daily for 10 days       valACYclovir 1000 mg tablet    VALTREX    21 tablet    Take 1 tablet (1,000 mg) by mouth 3 times daily

## 2017-05-18 NOTE — NURSING NOTE
"Chief Complaint   Patient presents with     Ear Problem     Panel Management     Hep C Screening, Tobacco Cessation       Initial /90  Pulse 80  Temp 99  F (37.2  C) (Temporal)  Resp 16  Ht 5' 6.65\" (1.693 m)  Wt 118 lb (53.5 kg)  SpO2 99%  BMI 18.67 kg/m2 Estimated body mass index is 18.67 kg/(m^2) as calculated from the following:    Height as of this encounter: 5' 6.65\" (1.693 m).    Weight as of this encounter: 118 lb (53.5 kg).  Medication Reconciliation: complete       Darius Rico MA    "

## 2017-05-18 NOTE — TELEPHONE ENCOUNTER
Reason for Call:  Same Day Appointment, Requested Provider:  Galindo Esparza M.D.    PCP: Galindo Esparza    Reason for visit: work in-high blood pressure, patient has been to the clinic 3 times this week and would like to see her pcp     Duration of symptoms:     Have you been treated for this in the past? Yes    Additional comments: Available May 25th-June5th    Can we leave a detailed message on this number? YES    Phone number patient can be reached at: Home number on file [phone] (home)    Best Time: any    Call taken on 5/18/2017 at 1:04 PM by Georgia Priest

## 2017-05-18 NOTE — PATIENT INSTRUCTIONS
No sudafed- may be factor in the high blood pressure    Pressure was high at the [facility] also though    Finish both the valtrex and the cefdinir     refilled the albuterol inhaler

## 2017-05-18 NOTE — PROGRESS NOTES
"  SUBJECTIVE:                                                    Lorna Gorman is a 51 year old female who presents to clinic today for the following health issues:      Acute Illness   Acute illness concerns: chest congestion, ear recheck  Onset: 1 week   Was seen at [facility] Sunday, 5 days ago for URI sx- 05/14/17. Dx with bronchitis and AOM. Started on omnicef BID.  Then noticed rash on chest next day 05/15/17, and was seen by - dx shingles and started on valtrex, is helping. Was itching. Is crusted now. Some body ache. Just doesn't feel \"well\" yet.  On side effects on the antibiotics. Low appetite. NO GI side effects.  - has 4 day trip coming up       Fever: no     Chills/Sweats: YES- had the chills     Headache (location?): no     Sinus Pressure:YES- had it - it is gone. Still congested through nose.     Conjunctivitis:  no    Ear Pain: YES- both - improved    Rhinorrhea: YES-- was green, now clearing up.     Congestion: YES    Sore Throat: no     Cough: YES-productive of yellow sputum-- \"I get it 2x per year\"- gets tight chest w/cough. The tightness is much better. Still some hoarseness. Usually gets albuterol, which helps. Did not get rx for that earlier this week.      Wheeze: YES    Decreased Appetite: YES    Nausea: YES    Vomiting: no     Diarrhea:  No     Dysuria/Freq.: no    Fatigue/Achiness: YES    Sick/Strep Exposure: no     Therapies Tried and outcome: cedinir, and valtrex    Smoker but has been using nicotine patch for 1 week instead.         Problem list and histories reviewed & adjusted, as indicated.  Additional history: as documented    Patient Active Problem List   Diagnosis     Allergic rhinitis     Tobacco abuse     Wrist injury     CARDIOVASCULAR SCREENING; LDL GOAL LESS THAN 160     TITA (generalized anxiety disorder)     Herpes zoster without complication     Past Surgical History:   Procedure Laterality Date     C ANESTH,NOSE,SINUS SURGERY  2000     C RESEC HEAD " OF DALJITANX,TOE       COLONOSCOPY N/A 2015    Procedure: COLONOSCOPY;  Surgeon: Ritesh Chiu MD;  Location: [facility]     HC HYSTEROSCOPY, SURGICAL; W/ ENDOMETRIAL ABLATION, ANY METHOD  09       Social History   Substance Use Topics     Smoking status: Current Some Day Smoker     Packs/day: 0.25     Years: 10.00     Last attempt to quit: 2015     Smokeless tobacco: Never Used     Alcohol use Yes      Comment: ocassionally     Family History   Problem Relation Age of Onset     Myocardial Infarction Father 40      at age 40     Hypertension Father      Lipids Father      C.A.D. Father       at age 40 from an MI     Arthritis Mother      Eye Disorder Mother      glaucoma     Genitourinary Problems Mother      hysterectomy     Hypertension Mother      Cardiovascular Maternal Grandmother      Eye Disorder Maternal Grandmother      cataracts     HEART DISEASE Maternal Grandmother      Hypertension Maternal Grandmother      Cardiovascular Maternal Grandfather      Hypertension Maternal Grandfather      Cardiovascular Paternal Grandmother      Hypertension Paternal Grandmother      Cardiovascular Paternal Grandfather      Hypertension Paternal Grandfather      Musculoskeletal Disorder Paternal Uncle      MS         Current Outpatient Prescriptions   Medication Sig Dispense Refill     albuterol (PROAIR HFA/PROVENTIL HFA/VENTOLIN HFA) 108 (90 BASE) MCG/ACT Inhaler Inhale 2 puffs into the lungs every 6 hours as needed for shortness of breath / dyspnea or wheezing 1 Inhaler 0     valACYclovir (VALTREX) 1000 mg tablet Take 1 tablet (1,000 mg) by mouth 3 times daily 21 tablet 0     cefdinir (OMNICEF) 300 MG capsule Take 2 capsules (600 mg) by mouth daily for 10 days 20 capsule 0     ASPIRIN PO Take 325 mg by mouth Reported on 2017       Allergies   Allergen Reactions     No Known Allergies      Ragweeds      BP Readings from Last 3 Encounters:   17 139/88   05/15/17 132/84   17 (!)  "149/105    Wt Readings from Last 3 Encounters:   05/18/17 118 lb (53.5 kg)   05/15/17 115 lb (52.2 kg)   12/17/16 117 lb (53.1 kg)                  Labs reviewed in EPIC    Reviewed and updated as needed this visit by clinical staff       Reviewed and updated as needed this visit by Provider         ROS:  Constitutional, HEENT, cardiovascular, pulmonary, gi and gu systems are negative, except as otherwise noted.    OBJECTIVE:                                                    /88  Pulse 80  Temp 99  F (37.2  C) (Temporal)  Resp 16  Ht 5' 6.65\" (1.693 m)  Wt 118 lb (53.5 kg)  SpO2 99%  BMI 18.67 kg/m2  Body mass index is 18.67 kg/(m^2).   GENERAL: healthy, alert and no distress  EYES: Eyes grossly normal to inspection, PERRL and conjunctivae and sclerae normal  HENT: Normal cephalic/atraumatic. Ear canals clear and TM's w/clear effusion but pink, with normal light reflex. Nose mucosa normal. Lips normal, no lesions. Buccal muscosa moist. Soft palate no lesions or ulcers. Tonsils normal, no erythema, no exudates. Uvula midline. Posterior oropharynx normal.   NECK: no adenopathy  RESP: lungs clear to auscultation - no rales, rhonchi or wheezes  CV: regular rate and rhythm, normal S1 S2, no S3 or S4, no murmur, click or rub, no peripheral edema and peripheral pulses strong  SKIN: Chest wall: left and right sided under bra band: linear rash with scab, appearance of excoriations.  NEURO: Normal strength and tone, mentation intact and speech normal    Diagnostic Test Results:  none      ASSESSMENT:                                                    1. Acute suppurative otitis media of both ears without spontaneous rupture of tympanic membranes, recurrence not specified  2. Herpes zoster without complication  3. Tobacco abuse  4. Acute bronchitis, unspecified organism    PLAN:                                                    1. Acute suppurative otitis media of both ears without spontaneous rupture of " tympanic membranes, recurrence not specified  Continue w/omnicef, finish full course. Appears to be resolving. Discussed could try flonase/nasal steroid, and can take fluid longer to clear once infection is treated.     2. Herpes zoster without complication  Finish valtrex. No apparent complications    3. Tobacco abuse  Hx of cough/chest tightness sx a few times per year. Sent w/rx for albuterol. Encouraged continued efforts at cessation.     4. Acute bronchitis, unspecified organism  - albuterol (PROAIR HFA/PROVENTIL HFA/VENTOLIN HFA) 108 (90 BASE) MCG/ACT Inhaler; Inhale 2 puffs into the lungs every 6 hours as needed for shortness of breath / dyspnea or wheezing  Dispense: 1 Inhaler; Refill: 0    5. Elevated blood pressure reading without diagnosis of hypertension  BP Screening:   Last 3 BP Readings:    BP Readings from Last 3 Encounters:   05/18/17 139/88   05/15/17 132/84   05/14/17 (!) 149/105       The following was recommended to the patient:  Referral to alternative primary care provider- referred to her PCP.   Reviewed pt's flowsheet with her. Has had HTN readings in the past. Per pt has been monitoring BPs with her PCP. Discussed factors that influence. Advised to avoid sudafed. Encouraged smoking cessation. Advised discussing treatment with PCP at her annual which she states is planned for the next month.     Follow Up: For worsening symptoms (ie new fevers, worsening pain, etc), non-improvement as expected/discussed, questions regarding your medications or treatment plan. Discussed parameters for follow up and included in After Visit Summary given to patient.      Leilani Segal PA-C  [facility]

## 2017-05-18 NOTE — PROGRESS NOTES
"  SUBJECTIVE:                                                    Lorna Gorman is a 51 year old female who presents to clinic today for the following health issues:    Hypertension Follow-up      Outpatient blood pressures are not being checked.    Low Salt Diet: no added salt       Amount of exercise or physical activity: None    Problems taking medications regularly: No    Medication side effects: none    Diet: regular (no restrictions)    Lorna presents today for evaluation of her high blood pressure. She has had multiple high blood pressure readings and strong family history of hypertension but has never been on medication. Patient wonders if being on the nicotine patch could be causing her high blood pressure, but admits that she just started this recently and was not using nicotine replacement when having her high blood pressure readings in December. She denies HAs, vision changes, chest pain, or palpitations. She does report some wheezing and SOB as she currently is being treated for bronchitis and was seen for this several days ago and diagnosed with bilateral AOM. She also currently is being treated for a bilateral shingles outbreak.     In addition to her hypertension, patient reports that she has been struggling with anxiety. She reports that she constantly worries \"about everything but myself\". She tries to breath through the episodes and tries to \"melt like an ice cube\" but admits that this has been difficult and she is interested in going back on medication. She was previously on medication for depression but not for anxiety. She reports no interest in counseling or therapy at this time.     Problem list and histories reviewed & adjusted, as indicated.  Additional history: as documented    Patient Active Problem List   Diagnosis     Allergic rhinitis     Tobacco abuse     Wrist injury     CARDIOVASCULAR SCREENING; LDL GOAL LESS THAN 160     TITA (generalized anxiety disorder)     Herpes zoster without " complication     Past Surgical History:   Procedure Laterality Date     C ANESTH,NOSE,SINUS SURGERY       C RESEC HEAD OF PHALANX,TOE       COLONOSCOPY N/A 2015    Procedure: COLONOSCOPY;  Surgeon: Ritesh Chiu MD;  Location:  GI     HC HYSTEROSCOPY, SURGICAL; W/ ENDOMETRIAL ABLATION, ANY METHOD  09       Social History   Substance Use Topics     Smoking status: Current Some Day Smoker     Packs/day: 0.25     Years: 10.00     Last attempt to quit: 2015     Smokeless tobacco: Never Used     Alcohol use Yes      Comment: ocassionally     Family History   Problem Relation Age of Onset     Myocardial Infarction Father 40      at age 40     Hypertension Father      Lipids Father      C.A.D. Father       at age 40 from an MI     Arthritis Mother      Eye Disorder Mother      glaucoma     Genitourinary Problems Mother      hysterectomy     Hypertension Mother      Cardiovascular Maternal Grandmother      Eye Disorder Maternal Grandmother      cataracts     HEART DISEASE Maternal Grandmother      Hypertension Maternal Grandmother      Cardiovascular Maternal Grandfather      Hypertension Maternal Grandfather      Cardiovascular Paternal Grandmother      Hypertension Paternal Grandmother      Cardiovascular Paternal Grandfather      Hypertension Paternal Grandfather      Musculoskeletal Disorder Paternal Uncle      MS         Current Outpatient Prescriptions   Medication Sig Dispense Refill     albuterol (PROAIR HFA/PROVENTIL HFA/VENTOLIN HFA) 108 (90 BASE) MCG/ACT Inhaler Inhale 2 puffs into the lungs every 6 hours as needed for shortness of breath / dyspnea or wheezing 1 Inhaler 0     sertraline (ZOLOFT) 50 MG tablet Take 0.5 tablets (25 mg) by mouth daily Take 1/2 tablet (25 mg) for 1-2 weeks, then increase to 1 tablet orally daily 45 tablet 3     lisinopril (PRINIVIL/ZESTRIL) 10 MG tablet Take 1 tablet (10 mg) by mouth daily 90 tablet 1     valACYclovir (VALTREX) 1000 mg tablet Take 1  "tablet (1,000 mg) by mouth 3 times daily 21 tablet 0     cefdinir (OMNICEF) 300 MG capsule Take 2 capsules (600 mg) by mouth daily for 10 days 20 capsule 0     Allergies   Allergen Reactions     No Known Allergies      Ragweeds      BP Readings from Last 3 Encounters:   05/18/17 (!) 158/98   05/18/17 139/88   05/15/17 132/84    Wt Readings from Last 3 Encounters:   05/18/17 118 lb (53.5 kg)   05/18/17 118 lb (53.5 kg)   05/15/17 115 lb (52.2 kg)         Reviewed and updated as needed this visit by clinical staff  Tobacco  Allergies  Meds  Med Hx  Surg Hx  Fam Hx  Soc Hx      Reviewed and updated as needed this visit by Provider       ROS:  Constitutional, HEENT, cardiovascular, pulmonary, gi and gu systems are negative, except as otherwise noted.    OBJECTIVE:                                                    BP (!) 158/98  Pulse 110  Temp 99  F (37.2  C) (Temporal)  Resp 24  Ht 5' 6.65\" (1.693 m)  Wt 118 lb (53.5 kg)  BMI 18.68 kg/m2  Body mass index is 18.68 kg/(m^2).  GENERAL: Thin, well-appearing female, alert and no distress  SKIN: Bilateral linear scabbed lesions over the thoracic ribs near the T4 level, mid/posterior axillary line. No exudates or erythema. Well-healing.  HENT: Ear canals normal. TMs with dulled light reflex and clear effusions bilaterally. No erythema.  RESP: Lungs with end-inspiratory heave in the posterior-inferior lobes bilaterally, otherwise clear to auscultation  CV: Regular rate and rhythm, normal S1 S2, no S3 or S4, no murmur, click or rub, no peripheral edema   PSYCH: Well-groomed female, pleasant, warm/bright affect, normal mentation, does not appear overly anxious    Diagnostic Test Results:  none      ASSESSMENT/PLAN:                                                    (I10) Benign essential hypertension  (primary encounter diagnosis)  Comment: Lorna Gorman is a 51 year old female with history of tobacco use disorder (currently on nicotine patch) and recent diagnosis " of bilateral AOM, bronchitis, and herpes zoster presenting for evaluation of HTN. Patient has an extensive family history of HTN including early death from CAD in her father, and has had multiple elevated blood pressure readings at visits over the past 6 months. She denies headaches, vision changes, chest pain, or shortness of breath (aside from that currently caused by her bronchitits).  Plan: lisinopril (PRINIVIL/ZESTRIL) 10 MG tablet  - Initiate lisinopril 10 mg daily for hypertension  - Follow-up in 1 month for routine well exam and blood pressure check; will obtain hypertension labs at that time (creatinine and urine microalbumin)  - Encouraged cessation of all tobacco products, including the nicotine patch, as nicotine itself acts as a vasoconstrictor and can worsen hypertension    (F41.1) TITA (generalized anxiety disorder)  Comment: Patient reports history of severe anxiety, particularly over the last winter. She has been on paroxetine, escitalopram, and sertraline in the past for depression but not since December 2016. Will restart medication today at low dose to see if this will help her anxiety. Discussed the use of cognitive behavioral therapy with the patient as this can help her to learn to deal more effectively with her anxiety, but patient is not interested in therapy at this time.  Plan: sertraline (ZOLOFT) 50 MG tablet  - Initiate sertraline 25 mg daily for 1-2 weeks, then increase to 50 mg daily  - Follow-up in 1 month at routine well exam  - Consider CBT if patient becomes open to this as she may benefit from this in the future    Patient was seen and examined by myself and Dr. Esparza.  The note was then scribed by me.    Patricia Glover, MS4    This patient was seen and examined by myself as well as the medical student.  The medical student scribed the note and I have reviewed it, edited it appropriately, and agree with the final documentation.     Electronically signed by:  Galindo Esparza  M.D.  5/18/2017

## 2017-05-18 NOTE — TELEPHONE ENCOUNTER
Ok to use a same day appt for this.     Electronically signed by:  Galindo Esparza M.D.  5/18/2017

## 2017-05-18 NOTE — NURSING NOTE
"Chief Complaint   Patient presents with     Hypertension       Initial BP (!) 158/98  Pulse 110  Temp 99  F (37.2  C) (Temporal)  Resp 24  Ht 5' 6.65\" (1.693 m)  Wt 118 lb (53.5 kg)  BMI 18.68 kg/m2 Estimated body mass index is 18.68 kg/(m^2) as calculated from the following:    Height as of this encounter: 5' 6.65\" (1.693 m).    Weight as of this encounter: 118 lb (53.5 kg).  Medication Reconciliation: complete    "

## 2017-05-18 NOTE — MR AVS SNAPSHOT
After Visit Summary   5/18/2017    Lorna Gorman    MRN: [id number]           Patient Information     Date Of Birth          1965        Visit Information        Provider Department      5/18/2017 3:20 PM Galindo Esparza MD [facility]        Today's Diagnoses     Benign essential hypertension    -  1    TITA (generalized anxiety disorder)           Follow-ups after your visit        Your next 10 appointments already scheduled     Jun 22, 2017  8:10 AM CDT   Office Visit with Galindo Esparza MD   [facility]    [address] [place] [id number] [phone]           Bring a current list of meds and any records pertaining to this visit.  For Physicals, please bring immunization records and any forms needing to be filled out.  Please arrive 10 minutes early to complete paperwork.              Who to contact     If you have questions or need follow up information about today's clinic visit or your schedule please contact [facility] directly at [phone].  Normal or non-critical lab and imaging results will be communicated to you by [employer], letter or phone within 4 business days after the clinic has received the results. If you do not hear from us within 7 days, please contact the clinic through [employer] or phone. If you have a critical or abnormal lab result, we will notify you by phone as soon as possible.  Submit refill requests through [employer] or call your pharmacy and they will forward the refill request to us. Please allow 3 business days for your refill to be completed.          Additional Information About Your Visit        [employer] Information     [employer] gives you secure access to your electronic health record. If you see a primary care provider, you can also send messages to your care team and make appointments. If you have questions, please call your primary care clinic.  If you do not have a  "primary care provider, please call [phone] and they will assist you.        Care EveryWhere ID     This is your Care EveryWhere ID. This could be used by other organizations to access your [facility] medical records  [id number]        Your Vitals Were     Pulse Temperature Respirations Height BMI (Body Mass Index)       110 99  F (37.2  C) (Temporal) 24 5' 6.65\" (1.693 m) 18.68 kg/m2        Blood Pressure from Last 3 Encounters:   05/18/17 (!) 158/98   05/18/17 139/88   05/15/17 132/84    Weight from Last 3 Encounters:   05/18/17 118 lb (53.5 kg)   05/18/17 118 lb (53.5 kg)   05/15/17 115 lb (52.2 kg)              Today, you had the following     No orders found for display         Today's Medication Changes          These changes are accurate as of: 5/18/17  5:28 PM.  If you have any questions, ask your nurse or doctor.               Start taking these medicines.        Dose/Directions    albuterol 108 (90 BASE) MCG/ACT Inhaler   Commonly known as:  PROAIR HFA/PROVENTIL HFA/VENTOLIN HFA   Used for:  Acute bronchitis, unspecified organism, Tobacco abuse   Started by:  Leilani Segal PA-C        Dose:  2 puff   Inhale 2 puffs into the lungs every 6 hours as needed for shortness of breath / dyspnea or wheezing   Quantity:  1 Inhaler   Refills:  0       lisinopril 10 MG tablet   Commonly known as:  PRINIVIL/ZESTRIL   Used for:  Benign essential hypertension   Started by:  Galindo Esparza MD        Dose:  10 mg   Take 1 tablet (10 mg) by mouth daily   Quantity:  90 tablet   Refills:  1       sertraline 50 MG tablet   Commonly known as:  ZOLOFT   Used for:  TITA (generalized anxiety disorder)   Started by:  Galindo Esparza MD        Dose:  25 mg   Take 0.5 tablets (25 mg) by mouth daily Take 1/2 tablet (25 mg) for 1-2 weeks, then increase to 1 tablet orally daily   Quantity:  45 tablet   Refills:  3            Where to get your medicines      These medications were sent to [facility] - [place], " [place] - [address] [facility]  [address] [facility] [address]     Phone:  [phone]     albuterol 108 (90 BASE) MCG/ACT Inhaler         [facility] [address] [facility] [address] [place] [address]     Phone:  [phone]     lisinopril 10 MG tablet    sertraline 50 MG tablet                Primary Care Provider Office Phone # Fax #    Galindo Esparza [phone]       [place] [address] [facility]   [facility] [id number]        Thank you!     Thank you for choosing [facility]  for your care. Our goal is always to provide you with excellent care. Hearing back from our patients is one way we can continue to improve our services. Please take a few minutes to complete the written survey that you may receive in the mail after your visit with us. Thank you!             Your Updated Medication List - Protect others around you: Learn how to safely use, store and throw away your medicines at www.disposemymeds.org.          This list is accurate as of: 5/18/17  5:28 PM.  Always use your most recent med list.                   Brand Name Dispense Instructions for use    albuterol 108 (90 BASE) MCG/ACT Inhaler    PROAIR HFA/PROVENTIL HFA/VENTOLIN HFA    1 Inhaler    Inhale 2 puffs into the lungs every 6 hours as needed for shortness of breath / dyspnea or wheezing       cefdinir 300 MG capsule    OMNICEF    20 capsule    Take 2 capsules (600 mg) by mouth daily for 10 days       lisinopril 10 MG tablet    PRINIVIL/ZESTRIL    90 tablet    Take 1 tablet (10 mg) by mouth daily       sertraline 50 MG tablet    ZOLOFT    45 tablet    Take 0.5 tablets (25 mg) by mouth daily Take 1/2 tablet (25 mg) for 1-2 weeks, then increase to 1 tablet orally daily       valACYclovir 1000 mg tablet    VALTREX    21 tablet    Take 1 tablet (1,000 mg) by mouth 3 times daily

## 2017-06-22 ENCOUNTER — OFFICE VISIT (OUTPATIENT)
Dept: FAMILY MEDICINE | Facility: CLINIC | Age: 52
End: 2017-06-22
Payer: COMMERCIAL

## 2017-06-22 VITALS
SYSTOLIC BLOOD PRESSURE: 102 MMHG | BODY MASS INDEX: 18.04 KG/M2 | DIASTOLIC BLOOD PRESSURE: 74 MMHG | OXYGEN SATURATION: 100 % | RESPIRATION RATE: 16 BRPM | WEIGHT: 114 LBS | TEMPERATURE: 98.1 F | HEART RATE: 92 BPM

## 2017-06-22 DIAGNOSIS — Z11.59 NEED FOR HEPATITIS C SCREENING TEST: ICD-10-CM

## 2017-06-22 DIAGNOSIS — Z00.01 ENCOUNTER FOR ROUTINE ADULT HEALTH EXAMINATION WITH ABNORMAL FINDINGS: Primary | ICD-10-CM

## 2017-06-22 DIAGNOSIS — I10 BENIGN ESSENTIAL HYPERTENSION: ICD-10-CM

## 2017-06-22 DIAGNOSIS — Z72.0 TOBACCO ABUSE: ICD-10-CM

## 2017-06-22 DIAGNOSIS — Z23 NEED FOR VACCINATION: ICD-10-CM

## 2017-06-22 DIAGNOSIS — F41.1 GAD (GENERALIZED ANXIETY DISORDER): ICD-10-CM

## 2017-06-22 PROBLEM — B02.9 HERPES ZOSTER WITHOUT COMPLICATION: Status: RESOLVED | Noted: 2017-05-15 | Resolved: 2017-06-22

## 2017-06-22 LAB
ANION GAP SERPL CALCULATED.3IONS-SCNC: 5 MMOL/L (ref 3–14)
BUN SERPL-MCNC: 14 MG/DL (ref 7–30)
CALCIUM SERPL-MCNC: 8.9 MG/DL (ref 8.5–10.1)
CHLORIDE SERPL-SCNC: 104 MMOL/L (ref 94–109)
CO2 SERPL-SCNC: 31 MMOL/L (ref 20–32)
CREAT SERPL-MCNC: 0.75 MG/DL (ref 0.52–1.04)
CREAT UR-MCNC: 122 MG/DL
GFR SERPL CREATININE-BSD FRML MDRD: 81 ML/MIN/1.7M2
GLUCOSE SERPL-MCNC: 97 MG/DL (ref 70–99)
MICROALBUMIN UR-MCNC: 9 MG/L
MICROALBUMIN/CREAT UR: 7.51 MG/G CR (ref 0–25)
POTASSIUM SERPL-SCNC: 4.3 MMOL/L (ref 3.4–5.3)
SODIUM SERPL-SCNC: 140 MMOL/L (ref 133–144)

## 2017-06-22 PROCEDURE — 82043 UR ALBUMIN QUANTITATIVE: CPT | Performed by: FAMILY MEDICINE

## 2017-06-22 PROCEDURE — 36415 COLL VENOUS BLD VENIPUNCTURE: CPT | Performed by: FAMILY MEDICINE

## 2017-06-22 PROCEDURE — 86803 HEPATITIS C AB TEST: CPT | Performed by: FAMILY MEDICINE

## 2017-06-22 PROCEDURE — 99396 PREV VISIT EST AGE 40-64: CPT | Performed by: FAMILY MEDICINE

## 2017-06-22 PROCEDURE — 80048 BASIC METABOLIC PNL TOTAL CA: CPT | Performed by: FAMILY MEDICINE

## 2017-06-22 RX ORDER — BUPROPION HYDROCHLORIDE 150 MG/1
150 TABLET ORAL EVERY MORNING
Qty: 90 TABLET | Refills: 3 | Status: SHIPPED | OUTPATIENT
Start: 2017-06-22 | End: 2018-01-21

## 2017-06-22 RX ORDER — LOSARTAN POTASSIUM 25 MG/1
25 TABLET ORAL DAILY
Qty: 90 TABLET | Refills: 1 | Status: SHIPPED | OUTPATIENT
Start: 2017-06-22 | End: 2017-12-03

## 2017-06-22 ASSESSMENT — ANXIETY QUESTIONNAIRES
6. BECOMING EASILY ANNOYED OR IRRITABLE: NOT AT ALL
5. BEING SO RESTLESS THAT IT IS HARD TO SIT STILL: NOT AT ALL
7. FEELING AFRAID AS IF SOMETHING AWFUL MIGHT HAPPEN: NOT AT ALL
IF YOU CHECKED OFF ANY PROBLEMS ON THIS QUESTIONNAIRE, HOW DIFFICULT HAVE THESE PROBLEMS MADE IT FOR YOU TO DO YOUR WORK, TAKE CARE OF THINGS AT HOME, OR GET ALONG WITH OTHER PEOPLE: NOT DIFFICULT AT ALL
3. WORRYING TOO MUCH ABOUT DIFFERENT THINGS: SEVERAL DAYS
1. FEELING NERVOUS, ANXIOUS, OR ON EDGE: SEVERAL DAYS
2. NOT BEING ABLE TO STOP OR CONTROL WORRYING: SEVERAL DAYS
GAD7 TOTAL SCORE: 3

## 2017-06-22 ASSESSMENT — PAIN SCALES - GENERAL: PAINLEVEL: NO PAIN (0)

## 2017-06-22 ASSESSMENT — PATIENT HEALTH QUESTIONNAIRE - PHQ9: 5. POOR APPETITE OR OVEREATING: NOT AT ALL

## 2017-06-22 NOTE — NURSING NOTE
"Chief Complaint   Patient presents with     Physical       Initial /74 (Cuff Size: Adult Regular)  Pulse 92  Temp 98.1  F (36.7  C) (Tympanic)  Resp 16  Wt 114 lb (51.7 kg)  SpO2 100%  Breastfeeding? No  BMI 18.04 kg/m2 Estimated body mass index is 18.04 kg/(m^2) as calculated from the following:    Height as of 5/18/17: 5' 6.65\" (1.693 m).    Weight as of this encounter: 114 lb (51.7 kg).  Medication Reconciliation: complete   Anatoly Isaac MA      "

## 2017-06-22 NOTE — MR AVS SNAPSHOT
After Visit Summary   6/22/2017    Lorna Gorman    MRN: [id number]           Patient Information     Date Of Birth          1965        Visit Information        Provider Department      6/22/2017 8:10 AM Galindo Esparza MD [facility]        Today's Diagnoses     Encounter for routine adult health examination with abnormal findings    -  1    Benign essential hypertension        TITA (generalized anxiety disorder)        Tobacco abuse        Need for hepatitis C screening test        Need for vaccination          Care Instructions      Preventive Health Recommendations  Female Ages 50 - 64    Yearly exam: See your health care provider every year in order to  o Review health changes.   o Discuss preventive care.    o Review your medicines if your doctor has prescribed any.      Get a Pap test every three years (unless you have an abnormal result and your provider advises testing more often).    If you get Pap tests with HPV test, you only need to test every 5 years, unless you have an abnormal result.     You do not need a Pap test if your uterus was removed (hysterectomy) and you have not had cancer.    You should be tested each year for STDs (sexually transmitted diseases) if you're at risk.     Have a mammogram every 1 to 2 years.    Have a colonoscopy at age 50, or have a yearly FIT test (stool test). These exams screen for colon cancer.      Have a cholesterol test every 5 years, or more often if advised.    Have a diabetes test (fasting glucose) every three years. If you are at risk for diabetes, you should have this test more often.     If you are at risk for osteoporosis (brittle bone disease), think about having a bone density scan (DEXA).    Shots: Get a flu shot each year. Get a tetanus shot every 10 years.    Nutrition:     Eat at least 5 servings of fruits and vegetables each day.    Eat whole-grain bread, whole-wheat pasta and brown rice instead of white grains and  rice.    Talk to your provider about Calcium and Vitamin D.     Lifestyle    Exercise at least 150 minutes a week (30 minutes a day, 5 days a week). This will help you control your weight and prevent disease.    Limit alcohol to one drink per day.    No smoking.     Wear sunscreen to prevent skin cancer.     See your dentist every six months for an exam and cleaning.    See your eye doctor every 1 to 2 years.            Follow-ups after your visit        Future tests that were ordered for you today     Open Future Orders        Priority Expected Expires Ordered    ZOSTER VACCINE LIVE SUB-Q NJX [25059] Routine  6/22/2018 6/22/2017            Who to contact     If you have questions or need follow up information about today's clinic visit or your schedule please contact [facility] directly at [phone].  Normal or non-critical lab and imaging results will be communicated to you by MyChart, letter or phone within 4 business days after the clinic has received the results. If you do not hear from us within 7 days, please contact the clinic through [facility] or phone. If you have a critical or abnormal lab result, we will notify you by phone as soon as possible.  Submit refill requests through [employer] or call your pharmacy and they will forward the refill request to us. Please allow 3 business days for your refill to be completed.          Additional Information About Your Visit        [facility] Information     [employer] gives you secure access to your electronic health record. If you see a primary care provider, you can also send messages to your care team and make appointments. If you have questions, please call your primary care clinic.  If you do not have a primary care provider, please call [phone] and they will assist you.        Care EveryWhere ID     This is your Care EveryWhere ID. This could be used by other organizations to access your [place] medical records  [id number]        Your Vitals  Were     Pulse Temperature Respirations Pulse Oximetry Breastfeeding? BMI (Body Mass Index)    92 98.1  F (36.7  C) (Tympanic) 16 100% No 18.04 kg/m2       Blood Pressure from Last 3 Encounters:   06/22/17 102/74   05/18/17 (!) 158/98   05/18/17 139/88    Weight from Last 3 Encounters:   06/22/17 114 lb (51.7 kg)   05/18/17 118 lb (53.5 kg)   05/18/17 118 lb (53.5 kg)              We Performed the Following     ** Albumin Random Urine Quant FUTURE 1yr     **Hepatitis C Screen Reflex to RNA FUTURE anytime     Basic metabolic panel          Today's Medication Changes          These changes are accurate as of: 6/22/17 11:49 AM.  If you have any questions, ask your nurse or doctor.               Start taking these medicines.        Dose/Directions    buPROPion 150 MG 24 hr tablet   Commonly known as:  WELLBUTRIN XL   Used for:  Tobacco abuse   Started by:  Galindo Esparza MD        Dose:  150 mg   Take 1 tablet (150 mg) by mouth every morning   Quantity:  90 tablet   Refills:  3       losartan 25 MG tablet   Commonly known as:  COZAAR   Used for:  Benign essential hypertension   Started by:  Galindo Esparza MD        Dose:  25 mg   Take 1 tablet (25 mg) by mouth daily   Quantity:  90 tablet   Refills:  1         Stop taking these medicines if you haven't already. Please contact your care team if you have questions.     lisinopril 10 MG tablet   Commonly known as:  PRINIVIL/ZESTRIL   Stopped by:  Galindo Esparza MD                Where to get your medicines      These medications were sent to [address] [address]     Phone:  [phone]     buPROPion 150 MG 24 hr tablet    losartan 25 MG tablet                Primary Care Provider Office Phone # Fax #    Galindo Esparza [phone]       [address] [place] [address]        Equal Access to Services     VICTOR HUGO KUO AH: Catalina  herber Obrien, waslimda luarelisadaha, qaybta kaalananda peña, waxcherelle idiin hayadamyesenia nationsaraopal miranda thais. So [facility] [phone].    ATENCIÓN: Si habla anitha, tiene a allison disposición servicios gratuitos de asistencia lingüística. Humberto al [phone].    We comply with applicable federal civil rights laws and [place] laws. We do not discriminate on the basis of race, color, national origin, age, disability sex, sexual orientation or gender identity.            Thank you!     Thank you for choosing [facility]  for your care. Our goal is always to provide you with excellent care. Hearing back from our patients is one way we can continue to improve our services. Please take a few minutes to complete the written survey that you may receive in the mail after your visit with us. Thank you!             Your Updated Medication List - Protect others around you: Learn how to safely use, store and throw away your medicines at www.disposemymeds.org.          This list is accurate as of: 6/22/17 11:49 AM.  Always use your most recent med list.                   Brand Name Dispense Instructions for use Diagnosis    albuterol 108 (90 BASE) MCG/ACT Inhaler    PROAIR HFA/PROVENTIL HFA/VENTOLIN HFA    1 Inhaler    Inhale 2 puffs into the lungs every 6 hours as needed for shortness of breath / dyspnea or wheezing    Acute bronchitis, unspecified organism, Tobacco abuse       buPROPion 150 MG 24 hr tablet    WELLBUTRIN XL    90 tablet    Take 1 tablet (150 mg) by mouth every morning    Tobacco abuse       losartan 25 MG tablet    COZAAR    90 tablet    Take 1 tablet (25 mg) by mouth daily    Benign essential hypertension       sertraline 50 MG tablet    ZOLOFT    45 tablet    Take 0.5 tablets (25 mg) by mouth daily Take 1/2 tablet (25 mg) for 1-2 weeks, then increase to 1 tablet orally daily    TITA (generalized anxiety disorder)

## 2017-06-22 NOTE — PROGRESS NOTES
SUBJECTIVE:     CC: Lorna Gorman is an 51 year old woman who presents for preventive health visit.     Healthy Habits:    Do you get at least three servings of calcium containing foods daily (dairy, green leafy vegetables, etc.)? no    Amount of exercise or daily activities, outside of work: walks a lot for her job     Problems taking medications regularly No    Medication side effects: Yes, persistent dry cough with lisinopril    Have you had an eye exam in the past two years? yes    Do you see a dentist twice per year? yes    Do you have sleep apnea, excessive snoring or daytime drowsiness?no    Today's PHQ-2 Score:   PHQ-2 ( 1999 Pfizer) 6/22/2017 5/14/2015   Q1: Little interest or pleasure in doing things 0 0   Q2: Feeling down, depressed or hopeless 0 0   PHQ-2 Score 0 0     PHQ-9 SCORE 12/9/2015 5/15/2017 6/22/2017   Total Score - - -   Total Score MyChart - 1 (Minimal depression) -   Total Score 4 - 2     TITA-7 SCORE 12/9/2015 5/15/2017 6/22/2017   Total Score - - -   Total Score - 3 (minimal anxiety) -   Total Score 10 - 3     Abuse: Current or Past(Physical, Sexual or Emotional)- No  Do you feel safe in your environment - Yes    Social History   Substance Use Topics     Smoking status: Current Some Day Smoker     Packs/day: 0.25     Years: 7.00     Types: Cigarettes     Start date: 1/1/2010     Smokeless tobacco: Never Used      Comment: 3 cigarettes/day (6/22/17)     Alcohol use 4.2 oz/week     7 Glasses of wine per week     The patient does not drink >3 drinks per day nor >7 drinks per week.    Recent Labs   Lab Test  09/30/14   1505  11/06/13   1132   CHOL  269*  241*   HDL  168  132*   LDL  89  98   TRIG  62  53   CHOLHDLRATIO  1.6  2.0     Reviewed orders with patient.  Reviewed health maintenance and updated orders accordingly - Yes    Mammo Decision Support:  Patient over age 50, mutual decision to screen reflected in health maintenance.    Pertinent mammograms are reviewed under the imaging  tab.  History of abnormal Pap smear: NO - age 30-65 PAP every 5 years with negative HPV co-testing recommended    Reviewed and updated as needed this visit by clinical staff  Tobacco  Allergies  Meds  Med Hx  Surg Hx  Fam Hx  Soc Hx        Reviewed and updated as needed this visit by Provider        Past Medical History:   Diagnosis Date     Benign essential hypertension 5/18/2017     GENERALIZED ANXIETY DIS 5/15/2007     Hyperlipidemia with target LDL less than 130 11/6/2013     Hypertension goal BP (blood pressure) < 140/90 5/18/2017     Major depressive disorder, recurrent episode, mild (H) 12/9/2015     Migraine, unspecified, without mention of intractable migraine without mention of status migrainosus      Moderate Depression [296.32] 8/19/2009     Premenstrual tension syndromes     PMDS on Sarafem and doing very well.     Tobacco abuse 11/6/2013      Past Surgical History:   Procedure Laterality Date     C ANESTH,NOSE,SINUS SURGERY  2000     C RESEC HEAD OF PHALANX,TOE  2003     COLONOSCOPY N/A 2/9/2015    Procedure: COLONOSCOPY;  Surgeon: Ritesh Chiu MD;  Location:  GI     HC HYSTEROSCOPY, SURGICAL; W/ ENDOMETRIAL ABLATION, ANY METHOD  12/18/09     ROS:  C: NEGATIVE for fever, chills, change in weight  I: NEGATIVE for worrisome rashes, moles or lesions  E: NEGATIVE for vision changes or irritation  ENT: NEGATIVE for ear, mouth and throat problems  R: NEGATIVE for significant cough or SOB  B: NEGATIVE for masses, tenderness or discharge  CV: NEGATIVE for chest pain or peripheral edema. POSITIVE for palpitations with nicotine patch.  GI: NEGATIVE for abdominal pain, heartburn, or change in bowel habits. POSITIVE for nausea with nicotine patch.  : NEGATIVE for unusual urinary or vaginal symptoms. No vaginal bleeding.  M: NEGATIVE for significant arthralgias or myalgia  N: NEGATIVE for weakness, dizziness or paresthesias  P: NEGATIVE for changes in mood or affect     Problem list, Medication list,  Allergies, and Medical/Social/Surgical histories reviewed in [facility] and updated as appropriate.  Labs reviewed in EPIC  BP Readings from Last 3 Encounters:   06/22/17 102/74   05/18/17 (!) 158/98   05/18/17 139/88    Wt Readings from Last 3 Encounters:   06/22/17 114 lb (51.7 kg)   05/18/17 118 lb (53.5 kg)   05/18/17 118 lb (53.5 kg)         Current Outpatient Prescriptions   Medication Sig Dispense Refill     buPROPion (WELLBUTRIN XL) 150 MG 24 hr tablet Take 1 tablet (150 mg) by mouth every morning 90 tablet 3     losartan (COZAAR) 25 MG tablet Take 1 tablet (25 mg) by mouth daily 90 tablet 1     albuterol (PROAIR HFA/PROVENTIL HFA/VENTOLIN HFA) 108 (90 BASE) MCG/ACT Inhaler Inhale 2 puffs into the lungs every 6 hours as needed for shortness of breath / dyspnea or wheezing 1 Inhaler 0     sertraline (ZOLOFT) 50 MG tablet Take 0.5 tablets (25 mg) by mouth daily Take 1/2 tablet (25 mg) for 1-2 weeks, then increase to 1 tablet orally daily 45 tablet 3     Allergies   Allergen Reactions     No Known Allergies      Ragweeds      OBJECTIVE:     /74 (Cuff Size: Adult Regular)  Pulse 92  Temp 98.1  F (36.7  C) (Tympanic)  Resp 16  Wt 114 lb (51.7 kg)  SpO2 100%  Breastfeeding? No  BMI 18.04 kg/m2  EXAM:  GENERAL APPEARANCE: Well-appearing thin female, healthy, alert and no distress  EYES: Eyes grossly normal to inspection, PERRL and conjunctivae and sclerae normal  HENT: Ear canals and TM's normal, nose and mouth without ulcers or lesions, oropharynx clear and oral mucous membranes moist  NECK: No adenopathy, no asymmetry, masses, or scars and thyroid normal to palpation  RESP: Lungs clear to auscultation - no crackles or wheezes  BREAST: Not examined.  CV: Regular rate and rhythm, normal S1 S2, no S3 or S4, no murmur, click or rub, no peripheral edema  ABDOMEN: Soft, nontender, no hepatosplenomegaly, no masses and bowel sounds normal  MS: No musculoskeletal defects are noted and gait is age appropriate  without ataxia  SKIN: Tanned, no suspicious lesions or rashes  NEURO: Normal strength and tone, sensory exam grossly normal, mentation intact and speech normal  PSYCH: Mentation appears normal and affect normal/bright, not overly anxious    Data:  Results for orders placed or performed in visit on 06/22/17   Basic metabolic panel   Result Value Ref Range    Sodium 140 133 - 144 mmol/L    Potassium 4.3 3.4 - 5.3 mmol/L    Chloride 104 94 - 109 mmol/L    Carbon Dioxide 31 20 - 32 mmol/L    Anion Gap 5 3 - 14 mmol/L    Glucose 97 70 - 99 mg/dL    Urea Nitrogen 14 7 - 30 mg/dL    Creatinine 0.75 0.52 - 1.04 mg/dL    GFR Estimate 81 >60 mL/min/1.7m2    GFR Estimate If Black >90   GFR Calc   >60 mL/min/1.7m2    Calcium 8.9 8.5 - 10.1 mg/dL   ** Albumin Random Urine Quant FUTURE 1yr   Result Value Ref Range    Creatinine Urine 122 mg/dL    Albumin Urine mg/L 9 mg/L    Albumin Urine mg/g Cr 7.51 0 - 25 mg/g Cr       ASSESSMENT/PLAN:     (Z00.01) Encounter for routine adult health examination with abnormal findings  (primary encounter diagnosis)  Comment: Lorna Gorman is a 51 year old female smoker with HTN and TITA who presents for routine well visit. Patient is doing well aside from concerns listed below. On exam she is well-appearing with normal vitals but noted to have lost 4 lbs since her last visit 1 month ago and is borderline underweight as Body mass index is 18.04 kg/(m^2).   Plan:   - Problem-specific plan as below  - Discussed that healthy BMI is anywhere from 18-25 kg/m2, though for postmenopausal women, risk of fractures and frailty is higher at low BMI. Recommended that patient continue to be active but try to maintain weight and ensure that she is eating enough of a well-balanced diet   - Patient will call her insurance to discuss coverage of zoster vaccine    (I10) Benign essential hypertension  Comment: Patient was started on lisinopril 10 mg daily 1 month ago for hypertension. Blood  pressure has been well controllwedf and is 102/74 today but patient does report persistent dry cough so will try switching from an ACEI to an ARB. Labs drawn today (BMP and urine microalbumin) were WNL. No headaches, chest pain, shortness of breath, or other symptoms of hypertension.  Plan: Basic metabolic panel, losartan (COZAAR) 25 MG         tablet, ** Albumin Random Urine Quant FUTURE         1yr  - Discontinue lisinopril and initiate losartan 25 mg daily  - Repeat labs (BMP, urine microalbumin) in 1 year  - Follow-up in 6 months    (F41.1) TITA (generalized anxiety disorder)  Comment: Well-controlled on sertraline 50 mg.  Plan:   - Continue on sertraline 50 mg daily  - If anxiety is well controlled after 1 month of the bupropion may consider stopping the sertraline and using the bupropion to help both with nicotine cravings (see below) and with mood symptoms.    (Z72.0) Tobacco abuse  Comment: Patient currently smoking around 3 cigarettes per day and wishes to quit. She had been using the nicotine patch but this caused her to have some palpitations and nausea and so she wishes to try something different. We discussed options to help with smoking cessation including QuitPlan resources, other nicotine replacement options, varenicline, and bupropion and patient is interested in bupropion.  Plan: buPROPion (WELLBUTRIN XL) 150 MG 24 hr tablet  - Set quit date and initiate bupropion 150 mg daily 2-3 weeks in advance of quit date. Once quit date is reached, get rid of all cigarettes, elvis trays, etc., and continue on bupropion.   - QuitPlan resources also provided to patient and encouraged her to use these in addition to the bupropion to increase chance of success.    (Z11.59) Need for hepatitis C screening test  Comment: Patient born between [birth year].  Plan: **Hepatitis C Screen Reflex to RNA FUTURE         anytime    COUNSELING:   Reviewed preventive health counseling, as reflected in patient instructions  Special  "attention given to:        Regular exercise       Healthy diet/nutrition       Immunizations    Discussed shingles vaccine        Osteoporosis Prevention/Bone Health       Consider Hep C screening for patients born between 1945 and 1965     reports that she has been smoking Cigarettes.  She started smoking about 7 years ago. She has a 1.75 pack-year smoking history. She has never used smokeless tobacco.  Tobacco Cessation Action Plan: Phone counseling: Place order for QuitPlan (Tobacco Cessation [facility] Referral 0309)  Pharmacotherapies : Zyban/Wellbutrin  Estimated body mass index is 18.04 kg/(m^2) as calculated from the following:    Height as of 5/18/17: 5' 6.65\" (1.693 m).    Weight as of this encounter: 114 lb (51.7 kg).     Counseling Resources:  ATP IV Guidelines  Pooled Cohorts Equation Calculator  Breast Cancer Risk Calculator  FRAX Risk Assessment  ICSI Preventive Guidelines  Dietary Guidelines for Americans, 2010  USDA's MyPlate  ASA Prophylaxis  Lung CA Screening    Patient was seen and examined by myself and Dr. Esparza.  The note was then scribed by me.    Patricia Glover, MS4    This patient was seen and examined by myself as well as the medical student.  The medical student scribed the note and I have reviewed it, edited it appropriately, and agree with the final documentation.     Electronically signed by:  Galindo Esparza M.D.  6/22/2017      "

## 2017-06-23 LAB — HCV AB SERPL QL IA: NORMAL

## 2017-06-23 ASSESSMENT — ANXIETY QUESTIONNAIRES: GAD7 TOTAL SCORE: 3

## 2017-06-23 ASSESSMENT — PATIENT HEALTH QUESTIONNAIRE - PHQ9: SUM OF ALL RESPONSES TO PHQ QUESTIONS 1-9: 2

## 2017-10-03 ENCOUNTER — ALLIED HEALTH/NURSE VISIT (OUTPATIENT)
Dept: FAMILY MEDICINE | Facility: OTHER | Age: 52
End: 2017-10-03
Payer: COMMERCIAL

## 2017-10-03 DIAGNOSIS — Z23 NEED FOR PROPHYLACTIC VACCINATION AND INOCULATION AGAINST INFLUENZA: Primary | ICD-10-CM

## 2017-10-03 PROCEDURE — 90471 IMMUNIZATION ADMIN: CPT

## 2017-10-03 PROCEDURE — 99207 ZZC NO CHARGE NURSE ONLY: CPT

## 2017-10-03 PROCEDURE — 90686 IIV4 VACC NO PRSV 0.5 ML IM: CPT

## 2017-10-03 NOTE — PROGRESS NOTES
Injectable Influenza Immunization Documentation    1.  Is the person to be vaccinated sick today?   No    2. Does the person to be vaccinated have an allergy to a component   of the vaccine?   No    3. Has the person to be vaccinated ever had a serious reaction   to influenza vaccine in the past?   No    4. Has the person to be vaccinated ever had Guillain-Barré syndrome?   No    Form completed by Lorna Gorman

## 2017-10-03 NOTE — NURSING NOTE
Prior to injection verified patient identity using patient's name and date of birth.  Goldie Davis

## 2017-10-03 NOTE — MR AVS SNAPSHOT
After Visit Summary   10/3/2017    Lorna Gorman    MRN: [id number]           Patient Information     Date Of Birth          1965        Visit Information        Provider Department      10/3/2017 11:00 AM NL FLU SHOT [facility]        Today's Diagnoses     Need for prophylactic vaccination and inoculation against influenza    -  1       Follow-ups after your visit        Who to contact     If you have questions or need follow up information about today's clinic visit or your schedule please contact [facility] directly at [phone].  Normal or non-critical lab and imaging results will be communicated to you by [employer], letter or phone within 4 business days after the clinic has received the results. If you do not hear from us within 7 days, please contact the clinic through [employer] or phone. If you have a critical or abnormal lab result, we will notify you by phone as soon as possible.  Submit refill requests through [employer] or call your pharmacy and they will forward the refill request to us. Please allow 3 business days for your refill to be completed.          Additional Information About Your Visit        MyChart Information     [employer] gives you secure access to your electronic health record. If you see a primary care provider, you can also send messages to your care team and make appointments. If you have questions, please call your primary care clinic.  If you do not have a primary care provider, please call [phone] and they will assist you.        Care EveryWhere ID     This is your Care EveryWhere ID. This could be used by other organizations to access your [place] medical records  [id number]         Blood Pressure from Last 3 Encounters:   06/22/17 102/74   05/18/17 (!) 158/98   05/18/17 139/88    Weight from Last 3 Encounters:   06/22/17 114 lb (51.7 kg)   05/18/17 118 lb (53.5 kg)   05/18/17 118 lb (53.5 kg)              We Performed the  Following     FLU VAC, SPLIT VIRUS IM > 3 YO (QUADRIVALENT) [29076]     Vaccine Administration, Initial [42431]        Primary Care Provider Office Phone # Fax #    Galindo Esparza [phone]       [address] [place] [address]        Equal Access to Services     [facility]: Hadii aad ku hadasho Soomaali, waaxda luqadaha, qaybta kaalmada adeegyada, johnson tucker hayadamn adeefrain nationsaraopal miranda . So [facility] [phone].    ATENCIÓN: Si habla español, tiene a allison disposición servicios gratuitos de asistencia lingüística. Humberto al [phone].    We comply with applicable federal civil rights laws and [place] laws. We do not discriminate on the basis of race, color, national origin, age, disability, sex, sexual orientation, or gender identity.            Thank you!     Thank you for choosing [facility]  for your care. Our goal is always to provide you with excellent care. Hearing back from our patients is one way we can continue to improve our services. Please take a few minutes to complete the written survey that you may receive in the mail after your visit with us. Thank you!             Your Updated Medication List - Protect others around you: Learn how to safely use, store and throw away your medicines at www.disposemymeds.org.          This list is accurate as of: 10/3/17 11:40 AM.  Always use your most recent med list.                   Brand Name Dispense Instructions for use Diagnosis    albuterol 108 (90 BASE) MCG/ACT Inhaler    PROAIR HFA/PROVENTIL HFA/VENTOLIN HFA    1 Inhaler    Inhale 2 puffs into the lungs every 6 hours as needed for shortness of breath / dyspnea or wheezing    Acute bronchitis, unspecified organism, Tobacco abuse       buPROPion 150 MG 24 hr tablet    WELLBUTRIN XL    90 tablet    Take 1 tablet (150 mg) by mouth every morning    Tobacco abuse       losartan 25 MG tablet    COZAAR    90 tablet    Take 1 tablet (25 mg) by mouth daily    Benign  essential hypertension       sertraline 50 MG tablet    ZOLOFT    45 tablet    Take 0.5 tablets (25 mg) by mouth daily Take 1/2 tablet (25 mg) for 1-2 weeks, then increase to 1 tablet orally daily    TITA (generalized anxiety disorder)

## 2017-10-18 ENCOUNTER — HOSPITAL ENCOUNTER (OUTPATIENT)
Dept: MAMMOGRAPHY | Facility: CLINIC | Age: 52
Discharge: HOME | End: 2017-10-18
Attending: FAMILY MEDICINE | Admitting: FAMILY MEDICINE
Payer: COMMERCIAL

## 2017-10-18 DIAGNOSIS — Z12.31 VISIT FOR SCREENING MAMMOGRAM: ICD-10-CM

## 2017-10-18 PROCEDURE — G0202 SCR MAMMO BI INCL CAD: HCPCS

## 2017-12-03 DIAGNOSIS — I10 BENIGN ESSENTIAL HYPERTENSION: ICD-10-CM

## 2017-12-05 ENCOUNTER — MYC REFILL (OUTPATIENT)
Dept: FAMILY MEDICINE | Facility: CLINIC | Age: 52
End: 2017-12-05

## 2017-12-05 DIAGNOSIS — I10 BENIGN ESSENTIAL HYPERTENSION: ICD-10-CM

## 2017-12-05 NOTE — TELEPHONE ENCOUNTER
Message from [employer]:  Original authorizing provider: Galindo Esparza MD, MD    Lorna Gorman would like a refill of the following medications:  losartan (COZAAR) 25 MG tablet [Galindo Esparza MD, MD]    Preferred pharmacy: [facility] [address] [address] [facility]    Comment:  Hi.... My refills are out . Can you let me know when i can get this refilled. This is the medication i really need. My pharmarcy is [facility] [place]

## 2017-12-05 NOTE — TELEPHONE ENCOUNTER
Requested Prescriptions   Pending Prescriptions Disp Refills     losartan (COZAAR) 25 MG tablet 90 tablet 1     Sig: Take 1 tablet (25 mg) by mouth daily    There is no refill protocol information for this order

## 2017-12-06 RX ORDER — LOSARTAN POTASSIUM 25 MG/1
25 TABLET ORAL DAILY
Qty: 90 TABLET | Refills: 1 | Status: SHIPPED | OUTPATIENT
Start: 2017-12-06 | End: 2018-01-21

## 2017-12-06 NOTE — TELEPHONE ENCOUNTER
LOV 06/22/2017.    Prescription approved per [facility] [place].    Sarah Hudson RN    Requested Prescriptions   Pending Prescriptions Disp Refills     losartan (COZAAR) 25 MG tablet [Pharmacy Med Name: Losartan Potassium Oral Tablet 25 MG] 30 tablet 0     Sig: TAKE ONE TABLET BY MOUTH ONE TIME DAILY    Angiotensin-II Receptors Passed    12/4/2017 11:55 AM       Passed - Blood pressure under 140/90 in past 12 months.    BP Readings from Last 3 Encounters:   06/22/17 102/74   05/18/17 (!) 158/98   05/18/17 139/88                Passed - Recent or future visit with authorizing provider's specialty    Patient had office visit in the last year or has a visit in the next 30 days with authorizing provider.  See chart review.              Passed - Patient is age 18 or older       Passed - No active pregnancy on record       Passed - Normal serum creatinine on file in past 12 months    Recent Labs   Lab Test  06/22/17   0852   CR  0.75            Passed - Normal serum potassium on file in past 12 months    Recent Labs   Lab Test  06/22/17   0852   POTASSIUM  4.3                   Passed - No positive pregnancy test in past 12 months

## 2017-12-07 RX ORDER — LOSARTAN POTASSIUM 25 MG/1
25 TABLET ORAL DAILY
Qty: 90 TABLET | Refills: 1 | Status: SHIPPED | OUTPATIENT
Start: 2017-12-07 | End: 2018-01-21

## 2017-12-07 NOTE — TELEPHONE ENCOUNTER
LOV 06/22/2017    Prescription approved per [facility] Refill Protocol.    Sarah Hudson RN     Requested Prescriptions   Pending Prescriptions Disp Refills     losartan (COZAAR) 25 MG tablet 90 tablet 1     Sig: Take 1 tablet (25 mg) by mouth daily    Angiotensin-II Receptors Passed    12/5/2017  1:42 PM       Passed - Blood pressure under 140/90 in past 12 months.    BP Readings from Last 3 Encounters:   06/22/17 102/74   05/18/17 (!) 158/98   05/18/17 139/88                Passed - Recent or future visit with authorizing provider's specialty    Patient had office visit in the last year or has a visit in the next 30 days with authorizing provider.  See chart review.              Passed - Patient is age 18 or older       Passed - No active pregnancy on record       Passed - Normal serum creatinine on file in past 12 months    Recent Labs   Lab Test  06/22/17   0852   CR  0.75            Passed - Normal serum potassium on file in past 12 months    Recent Labs   Lab Test  06/22/17   0852   POTASSIUM  4.3                   Passed - No positive pregnancy test in past 12 months

## 2018-01-21 ENCOUNTER — OFFICE VISIT (OUTPATIENT)
Dept: URGENT CARE | Facility: RETAIL CLINIC | Age: 53
End: 2018-01-21
Payer: COMMERCIAL

## 2018-01-21 VITALS — SYSTOLIC BLOOD PRESSURE: 146 MMHG | HEART RATE: 101 BPM | DIASTOLIC BLOOD PRESSURE: 82 MMHG | TEMPERATURE: 98.8 F

## 2018-01-21 DIAGNOSIS — R21 RASH: Primary | ICD-10-CM

## 2018-01-21 LAB — S PYO AG THROAT QL IA.RAPID: NEGATIVE

## 2018-01-21 PROCEDURE — 87081 CULTURE SCREEN ONLY: CPT | Performed by: PHYSICIAN ASSISTANT

## 2018-01-21 PROCEDURE — 99213 OFFICE O/P EST LOW 20 MIN: CPT | Performed by: PHYSICIAN ASSISTANT

## 2018-01-21 PROCEDURE — 87880 STREP A ASSAY W/OPTIC: CPT | Mod: QW | Performed by: PHYSICIAN ASSISTANT

## 2018-01-21 RX ORDER — BUPROPION HYDROCHLORIDE 150 MG/1
150 TABLET ORAL
COMMUNITY
Start: 2017-06-22 | End: 2019-01-30

## 2018-01-21 RX ORDER — TRIAMCINOLONE ACETONIDE 1 MG/ML
LOTION TOPICAL 2 TIMES DAILY
Qty: 60 ML | Refills: 1 | Status: SHIPPED | OUTPATIENT
Start: 2018-01-21 | End: 2019-04-04

## 2018-01-21 RX ORDER — LOSARTAN POTASSIUM 25 MG/1
25 TABLET ORAL
COMMUNITY
Start: 2017-06-22 | End: 2019-01-30

## 2018-01-21 NOTE — NURSING NOTE
"Chief Complaint   Patient presents with     Derm Problem     rash on torso; itchy; 2-3 days; small, very red spots all over abdomen and spreading to back       Initial /84 (BP Location: Left arm)  Pulse 101  Temp 98.8  F (37.1  C) (Oral) Estimated body mass index is 18.04 kg/(m^2) as calculated from the following:    Height as of 5/18/17: 5' 6.65\" (1.693 m).    Weight as of 6/22/17: 114 lb (51.7 kg).  Medication Reconciliation: complete  "

## 2018-01-21 NOTE — MR AVS SNAPSHOT
After Visit Summary   1/21/2018    Lorna Gorman    MRN: [id number]           Patient Information     Date Of Birth          1965        Visit Information        Provider Department      1/21/2018 9:10 AM Starla Whitley PA-C [facility] [place]        Today's Diagnoses     Rash    -  1      Care Instructions    Apply lotion to affected areas as needed twice until clear   Take zyrtec/certizine GREEN BOX twice a day for itch/rash  Ice packs as needed for itch  Rapid strep test today is negative.   Your throat culture is pending. [facility] will call you if positive results to start antibiotics at that time.  No phone call if strep culture is negative   Please follow up with primary care provider if not improving, worsening or new symptoms            Follow-ups after your visit        Who to contact     You can reach your care team any time of the day by calling [phone].  Notification of test results:  If you have an abnormal lab result, we will notify you by phone as soon as possible.         Additional Information About Your Visit        MyChart Information     [employer] gives you secure access to your electronic health record. If you see a primary care provider, you can also send messages to your care team and make appointments. If you have questions, please call your primary care clinic.  If you do not have a primary care provider, please call [phone] and they will assist you.        Care EveryWhere ID     This is your Care EveryWhere ID. This could be used by other organizations to access your [facility] medical records  [id number]        Your Vitals Were     Pulse Temperature                101 98.8  F (37.1  C) (Oral)           Blood Pressure from Last 3 Encounters:   01/21/18 146/82   06/22/17 102/74   05/18/17 (!) 158/98    Weight from Last 3 Encounters:   06/22/17 114 lb (51.7 kg)   05/18/17 118 lb (53.5 kg)   05/18/17 118 lb (53.5 kg)              We Performed  the Following     BETA STREP GROUP A R/O CULTURE     RAPID STREP SCREEN          Today's Medication Changes          These changes are accurate as of: 1/21/18  9:50 AM.  If you have any questions, ask your nurse or doctor.               Start taking these medicines.        Dose/Directions    triamcinolone 0.1 % lotion   Commonly known as:  KENALOG   Used for:  Rash        Apply topically 2 times daily To affected areas as needed   Quantity:  60 mL   Refills:  1         Stop taking these medicines if you haven't already. Please contact your care team if you have questions.     sertraline 50 MG tablet   Commonly known as:  ZOLOFT                Where to get your medicines      These medications were sent to [id number] - [place], [place] - [address]  [address] [address] [address]     Phone:  [phone]     triamcinolone 0.1 % lotion                Primary Care Provider Office Phone # Fax #    Galindo Esparza [phone]       [address] [place] [address]        Equal Access to Services     [facility] : Hadii herber ku hadasho Soomaali, waaxda luqadaha, qaybta kaalmada adeegyada, waxay idiin hayemeka miranda . [facility] [phone].    ATENCIÓN: Si habla español, tiene a allison disposición servicios gratuitos de asistencia lingüística. [facility] [phone].    We comply with applicable federal civil rights laws and [place] laws. We do not discriminate on the basis of race, color, national origin, age, disability, sex, sexual orientation, or gender identity.            Thank you!     Thank you for choosing [facility]  for your care. Our goal is always to provide you with excellent care. Hearing back from our patients is one way we can continue to improve our services. Please take a few minutes to complete the written survey that you may receive in the mail after your visit with us. Thank you!             Your Updated Medication List  - Protect others around you: Learn how to safely use, store and throw away your medicines at www.disposemymeds.org.          This list is accurate as of: 1/21/18  9:50 AM.  Always use your most recent med list.                   Brand Name Dispense Instructions for use Diagnosis    albuterol 108 (90 BASE) MCG/ACT Inhaler    PROAIR HFA/PROVENTIL HFA/VENTOLIN HFA    1 Inhaler    Inhale 2 puffs into the lungs every 6 hours as needed for shortness of breath / dyspnea or wheezing    Acute bronchitis, unspecified organism, Tobacco abuse       ASPIRIN PO           buPROPion 150 MG 24 hr tablet    WELLBUTRIN XL     Take 150 mg by mouth        losartan 25 MG tablet    COZAAR     Take 25 mg by mouth        triamcinolone 0.1 % lotion    KENALOG    60 mL    Apply topically 2 times daily To affected areas as needed    Rash

## 2018-01-21 NOTE — PROGRESS NOTES
Chief Complaint   Patient presents with     Derm Problem     rash on torso; itchy; 2-3 days; small, very red spots all over abdomen and spreading to back      SUBJECTIVE:  Lorna Gorman is a 52 year old female who presents to the clinic today for a rash.  Onset of rash was 2 day(s) ago.   Rash is gradual onset and worsening.  Location of the rash: started as a few spots on abdomen 2 days ago, then spread on abdomen and now on lower back, no other location  No recent new foods, medications, lotions, creams, detergents, clothing, hot tub use, or other exposures recalled.   No recent respiratory illness. No sore throat or GI sxs.  Quality/symptoms of rash: itchy  Symptoms are moderate and rash seems to be stable.  Previous history of a similar rash? No  Recent exposure history: none known  Associated symptoms include: nothing.    Past Medical History:   Diagnosis Date     Benign essential hypertension 5/18/2017     GENERALIZED ANXIETY DIS 5/15/2007     Hyperlipidemia with target LDL less than 130 11/6/2013     Hypertension goal BP (blood pressure) < 140/90 5/18/2017     Major depressive disorder, recurrent episode, mild (H) 12/9/2015     Migraine, unspecified, without mention of intractable migraine without mention of status migrainosus      Moderate Depression [296.32] 8/19/2009     Premenstrual tension syndromes     PMDS on Sarafem and doing very well.     Tobacco abuse 11/6/2013     Current Outpatient Prescriptions   Medication Sig Dispense Refill     losartan (COZAAR) 25 MG tablet Take 25 mg by mouth       ASPIRIN PO        buPROPion (WELLBUTRIN XL) 150 MG 24 hr tablet Take 150 mg by mouth       [DISCONTINUED] losartan (COZAAR) 25 MG tablet Take 1 tablet (25 mg) by mouth daily (Patient not taking: Reported on 1/21/2018) 90 tablet 1     [DISCONTINUED] losartan (COZAAR) 25 MG tablet Take 1 tablet (25 mg) by mouth daily (Patient not taking: Reported on 1/21/2018) 90 tablet 1     [DISCONTINUED] buPROPion (WELLBUTRIN  XL) 150 MG 24 hr tablet Take 1 tablet (150 mg) by mouth every morning (Patient not taking: Reported on 1/21/2018) 90 tablet 3     albuterol (PROAIR HFA/PROVENTIL HFA/VENTOLIN HFA) 108 (90 BASE) MCG/ACT Inhaler Inhale 2 puffs into the lungs every 6 hours as needed for shortness of breath / dyspnea or wheezing (Patient not taking: Reported on 1/21/2018) 1 Inhaler 0        Allergies   Allergen Reactions     No Known Allergies      Ragweeds         History   Smoking Status     Current Some Day Smoker     Packs/day: 0.25     Years: 7.00     Types: Cigarettes     Start date: 1/1/2010   Smokeless Tobacco     Never Used     Comment: 3 cigarettes/day (6/22/17)       ROS:  CONSTITUTIONAL:NEGATIVE for fever, chills,  ENT/MOUTH: NEGATIVE for ear, mouth and throat problems  RESP:NEGATIVE for significant cough or wheezing    EXAM:   /82  Pulse 101  Temp 98.8  F (37.1  C) (Oral)  GENERAL: alert, no acute distress.  SKIN: Rash description:    Distribution: generalized  Location: abdomen, lower back  Color: pink,  Lesion type: papules scattered along abdomen and lower back, Isolated, not clustered. Tiny scabs on top, due to scratching. No drainage, vesicles, pustules, erythema. No scales seen.   Throat - no erythema or exudate  NECK: supple, non-tender to palpation, no adenopathy noted    Rapid strep test neg, culture pending    ASSESSMENT:  (R21) Rash  (primary encounter diagnosis)    PLAN:  Performed strep test to check for strep/guttate psoriasis low on DDx..   RAPID STREP SCREEN  BETA STREP GROUP A R/O CULTURE  triamcinolone (KENALOG) 0.1 % lotion  Apply steroid lotion to affected areas as needed twice until clear   Take zyrtec/certizine 1 tablet twice a day for itch/rash  Ice packs as needed for itch  Rapid strep test today is negative.   Your throat culture is pending. [facility] will call you if positive results to start antibiotics at that time.  No phone call if strep culture is negative   Please follow up with  primary care provider if rash is not improving, worsening or new symptoms      Starla Whitley PA-C  [facility]

## 2018-01-21 NOTE — PATIENT INSTRUCTIONS
Apply steroid lotion to affected areas as needed twice until clear   Take zyrtec/certizine GREEN BOX twice a day for itch/rash  Ice packs as needed for itch  Rapid strep test today is negative.   Your throat culture is pending. [facility] will call you if positive results to start antibiotics at that time.  No phone call if strep culture is negative   Please follow up with primary care provider if not improving, worsening or new symptoms

## 2018-01-23 LAB — BETA STREP CONFIRM: NORMAL

## 2018-02-08 NOTE — PROGRESS NOTES
"  SUBJECTIVE:   Lorna Gorman is a 52 year old female who presents to clinic today for the following health issues:    HPI  Joint Pain - Possible sprained ankle    Onset: 1 week ago    Description:   Location: left ankle  Character: Sharp    Intensity: 10/10    Progression of Symptoms: worse    Accompanying Signs & Symptoms:  Other symptoms: swelling    History:   Previous similar pain: no       Precipitating factors:   Trauma or overuse: YES- missed a step going down    Alleviating factors:  Improved by: Ibuprofen    Therapies Tried and outcome: Ibuprofen helps, wrap does not help    Walking is ok, if standing long has pain. Tingling without numbness. Bruising.       Problem list and histories reviewed & adjusted, as indicated.  Additional history: as documented      Current Outpatient Prescriptions   Medication Sig Dispense Refill     buPROPion (WELLBUTRIN XL) 150 MG 24 hr tablet Take 150 mg by mouth       losartan (COZAAR) 25 MG tablet Take 25 mg by mouth       ASPIRIN PO        triamcinolone (KENALOG) 0.1 % lotion Apply topically 2 times daily To affected areas as needed 60 mL 1     albuterol (PROAIR HFA/PROVENTIL HFA/VENTOLIN HFA) 108 (90 BASE) MCG/ACT Inhaler Inhale 2 puffs into the lungs every 6 hours as needed for shortness of breath / dyspnea or wheezing (Patient not taking: Reported on 1/21/2018) 1 Inhaler 0     BP Readings from Last 3 Encounters:   02/09/18 132/72   01/21/18 146/82   06/22/17 102/74    Wt Readings from Last 3 Encounters:   02/09/18 128 lb (58.1 kg)   06/22/17 114 lb (51.7 kg)   05/18/17 118 lb (53.5 kg)                    ROS:  C: NEGATIVE for fever, chills, change in weight    OBJECTIVE:     /72  Pulse 90  Temp 99.3  F (37.4  C) (Temporal)  Resp 14  Ht 5' 6.53\" (1.69 m)  Wt 128 lb (58.1 kg)  SpO2 99%  BMI 20.33 kg/m2  Body mass index is 20.33 kg/(m^2).  Physical Exam   Constitutional: She is oriented to person, place, and time.   Musculoskeletal:        Left ankle: She " exhibits swelling (LM) and ecchymosis. She exhibits normal range of motion and normal pulse. Tenderness. Lateral malleolus tenderness found. Achilles tendon normal. Achilles tendon exhibits no pain, no defect and normal Portillo's test results.        Feet:    Neurological: She is alert and oriented to person, place, and time. No sensory deficit.   Skin: Ecchymosis noted. No erythema.         Diagnostic Test Results:  Xray - Left foot and ankle   Negative for fractures. Positive for osteoarthritis. Discussed at OV.     ASSESSMENT/PLAN:       ICD-10-CM    1. Sprain of left ankle, unspecified ligament, initial encounter S93.402A XR Foot Left G/E 3 Views     XR Ankle Left G/E 3 Views     -IBUPROFEN, 800 t.i.d. with food for the next week, decrease as pain resolves  -TYLENOL 1000 mg every 6 hours; maximum daily dose: 3000 mg daily .   -Icing for 72 hours (20 minute on 20 minutes off) then can move to heat  -  Wear walking boot 24/7 for the next couple weeks  - Ankle exercises as demonstrated below to promote healing and prevent stiffness of joint.   - If worsening pain or no improvements in 4-6 weeks please return to clinic.   - Preliminary results of xray negative for fractures.   - Had previous foot surgery these results show on xray, she reports that she has a history of trauma to this toe also.     The patient indicates understanding of these issues and agrees with the plan.    Patient Instructions     -IBUPROFEN, 800 t.i.d. with food for the next week, decrease as pain resolves  -TYLENOL 1000 mg every 6 hours; maximum daily dose: 3000 mg daily .   -Icing for 72 hours (20 minute on 20 minutes off) then can move to heat  -  Wear walking boot 24/7 for the next couple weeks  - Ankle exercises as demonstrated below to promote healing and prevent stiffness of joint.   - If worsening pain or no improvements in 4-6 weeks please return to clinic.         MAKEDA Chapin CNP  Questions or concerns please feel free to  send me a [employer] message or call me at [phone]      Treating Ankle Sprains  Treatment will depend on how bad your sprain is. For a severe sprain, healing may take 3 months or more.  Right after your injury: Use R.I.C.E.    Rest: At first, keep weight off the ankle as much as you can. You may be given crutches to help you walk without putting weight on the ankle.    Ice: Put an ice pack on the ankle for 15 minutes. Remove the pack and wait at least 30 minutes. Repeat for up to 3 days. This helps reduce swelling.    Compression: To reduce swelling and keep the joint stable, you may need to wrap the ankle with an elastic bandage. For more severe sprains, you may need an ankle brace or a cast.    Elevation: To reduce swelling, keep your ankle raised above your heart when you sit or lie down.  Medicine  Your healthcare provider may suggest oral non-steroidal anti-inflammatory medicine (NSAIDs), such as ibuprofen. This relieves the pain and helps reduce any swelling. Be sure to take your medicine as directed.  Contrast baths  After 3 days, soak your ankle in warm water for 30 seconds, then in cool water for 30 seconds. Go back and forth for 5 minutes. Doing this every 2 hours will help keep the swelling down.  Exercises    After about 2 to 3 weeks, you may be given exercises to strengthen the ligaments and muscles in the ankle. Doing these exercises will help prevent another ankle sprain. Exercises may include standing on your toes and then on your heels and doing ankle curls.  Ankle curls    Sit on the edge of a sturdy table or lie on your back.    Pull your toes toward you. Then point them away from you. Repeat for 2 to 3 minutes.   Date Last Reviewed: 9/28/2015 2000-2017 [employer]. [address] [place] [address], [place] [address]. All rights reserved. This information is not intended as a substitute for professional medical care. Always follow your healthcare professional's  instructions.            MAKEDA Chapin [facility]

## 2018-02-09 ENCOUNTER — OFFICE VISIT (OUTPATIENT)
Dept: FAMILY MEDICINE | Facility: OTHER | Age: 53
End: 2018-02-09
Payer: COMMERCIAL

## 2018-02-09 ENCOUNTER — RADIANT APPOINTMENT (OUTPATIENT)
Dept: GENERAL RADIOLOGY | Facility: OTHER | Age: 53
End: 2018-02-09
Attending: NURSE PRACTITIONER
Payer: COMMERCIAL

## 2018-02-09 VITALS
OXYGEN SATURATION: 99 % | HEART RATE: 90 BPM | DIASTOLIC BLOOD PRESSURE: 72 MMHG | WEIGHT: 128 LBS | TEMPERATURE: 99.3 F | RESPIRATION RATE: 14 BRPM | HEIGHT: 67 IN | SYSTOLIC BLOOD PRESSURE: 132 MMHG | BODY MASS INDEX: 20.09 KG/M2

## 2018-02-09 DIAGNOSIS — S93.402A SPRAIN OF LEFT ANKLE, UNSPECIFIED LIGAMENT, INITIAL ENCOUNTER: ICD-10-CM

## 2018-02-09 DIAGNOSIS — S93.402A SPRAIN OF LEFT ANKLE, UNSPECIFIED LIGAMENT, INITIAL ENCOUNTER: Primary | ICD-10-CM

## 2018-02-09 PROCEDURE — 73630 X-RAY EXAM OF FOOT: CPT | Mod: LT

## 2018-02-09 PROCEDURE — 73610 X-RAY EXAM OF ANKLE: CPT | Mod: LT

## 2018-02-09 PROCEDURE — 99213 OFFICE O/P EST LOW 20 MIN: CPT | Performed by: NURSE PRACTITIONER

## 2018-02-09 ASSESSMENT — PATIENT HEALTH QUESTIONNAIRE - PHQ9
10. IF YOU CHECKED OFF ANY PROBLEMS, HOW DIFFICULT HAVE THESE PROBLEMS MADE IT FOR YOU TO DO YOUR WORK, TAKE CARE OF THINGS AT HOME, OR GET ALONG WITH OTHER PEOPLE: NOT DIFFICULT AT ALL
SUM OF ALL RESPONSES TO PHQ QUESTIONS 1-9: 1
SUM OF ALL RESPONSES TO PHQ QUESTIONS 1-9: 1

## 2018-02-09 ASSESSMENT — ANXIETY QUESTIONNAIRES
7. FEELING AFRAID AS IF SOMETHING AWFUL MIGHT HAPPEN: NOT AT ALL
6. BECOMING EASILY ANNOYED OR IRRITABLE: NOT AT ALL
GAD7 TOTAL SCORE: 0
2. NOT BEING ABLE TO STOP OR CONTROL WORRYING: NOT AT ALL
GAD7 TOTAL SCORE: 0
1. FEELING NERVOUS, ANXIOUS, OR ON EDGE: NOT AT ALL
3. WORRYING TOO MUCH ABOUT DIFFERENT THINGS: NOT AT ALL
4. TROUBLE RELAXING: NOT AT ALL
5. BEING SO RESTLESS THAT IT IS HARD TO SIT STILL: NOT AT ALL
7. FEELING AFRAID AS IF SOMETHING AWFUL MIGHT HAPPEN: NOT AT ALL
GAD7 TOTAL SCORE: 0

## 2018-02-09 ASSESSMENT — PAIN SCALES - GENERAL: PAINLEVEL: NO PAIN (0)

## 2018-02-09 NOTE — NURSING NOTE
"Chief Complaint   Patient presents with     Musculoskeletal Problem     Panel Management     height       Initial /72  Pulse 90  Temp 99.3  F (37.4  C) (Temporal)  Resp 14  Ht 5' 6.53\" (1.69 m)  Wt 128 lb (58.1 kg)  SpO2 99%  BMI 20.33 kg/m2 Estimated body mass index is 20.33 kg/(m^2) as calculated from the following:    Height as of this encounter: 5' 6.53\" (1.69 m).    Weight as of this encounter: 128 lb (58.1 kg).  Medication Reconciliation: complete  "

## 2018-02-09 NOTE — PATIENT INSTRUCTIONS
-IBUPROFEN, 800 t.i.d. with food for the next week, decrease as pain resolves  -TYLENOL 1000 mg every 6 hours; maximum daily dose: 3000 mg daily .   -Icing for 72 hours (20 minute on 20 minutes off) then can move to heat  -  Wear walking boot 24/7 for the next couple weeks  - Ankle exercises as demonstrated below to promote healing and prevent stiffness of joint.   - If worsening pain or no improvements in 4-6 weeks please return to clinic.         MAKEDA Chapin CNP  Questions or concerns please feel free to send me a [employer] message or call me at [phone]      Treating Ankle Sprains  Treatment will depend on how bad your sprain is. For a severe sprain, healing may take 3 months or more.  Right after your injury: Use R.I.C.E.    Rest: At first, keep weight off the ankle as much as you can. You may be given crutches to help you walk without putting weight on the ankle.    Ice: Put an ice pack on the ankle for 15 minutes. Remove the pack and wait at least 30 minutes. Repeat for up to 3 days. This helps reduce swelling.    Compression: To reduce swelling and keep the joint stable, you may need to wrap the ankle with an elastic bandage. For more severe sprains, you may need an ankle brace or a cast.    Elevation: To reduce swelling, keep your ankle raised above your heart when you sit or lie down.  Medicine  Your healthcare provider may suggest oral non-steroidal anti-inflammatory medicine (NSAIDs), such as ibuprofen. This relieves the pain and helps reduce any swelling. Be sure to take your medicine as directed.  Contrast baths  After 3 days, soak your ankle in warm water for 30 seconds, then in cool water for 30 seconds. Go back and forth for 5 minutes. Doing this every 2 hours will help keep the swelling down.  Exercises    After about 2 to 3 weeks, you may be given exercises to strengthen the ligaments and muscles in the ankle. Doing these exercises will help prevent another ankle sprain. Exercises may  include standing on your toes and then on your heels and doing ankle curls.  Ankle curls    Sit on the edge of a sturdy table or lie on your back.    Pull your toes toward you. Then point them away from you. Repeat for 2 to 3 minutes.   Date Last Reviewed: 9/28/2015 2000-2017 [employer] [address] [place] [address], [place] [id number]. All rights reserved. This information is not intended as a substitute for professional medical care. Always follow your healthcare professional's instructions.

## 2018-02-09 NOTE — MR AVS SNAPSHOT
After Visit Summary   2/9/2018    Lrona Gorman    MRN: [id number]           Patient Information     Date Of Birth          1965        Visit Information        Provider Department      2/9/2018 3:00 PM Citlali Fisher APRN CNP [facility]        Today's Diagnoses     Sprain of left ankle, unspecified ligament, initial encounter    -  1      Care Instructions    -IBUPROFEN, 800 t.i.d. with food for the next week, decrease as pain resolves  -TYLENOL 1000 mg every 6 hours; maximum daily dose: 3000 mg daily .   -Icing for 72 hours (20 minute on 20 minutes off) then can move to heat  -  Wear walking boot 24/7 for the next couple weeks  - Ankle exercises as demonstrated below to promote healing and prevent stiffness of joint.   - If worsening pain or no improvements in 4-6 weeks please return to clinic.         MAKEDA Chapin CNP  Questions or concerns please feel free to send me a WeLink message or call me at [phone]      Treating Ankle Sprains  Treatment will depend on how bad your sprain is. For a severe sprain, healing may take 3 months or more.  Right after your injury: Use R.I.C.E.    Rest: At first, keep weight off the ankle as much as you can. You may be given crutches to help you walk without putting weight on the ankle.    Ice: Put an ice pack on the ankle for 15 minutes. Remove the pack and wait at least 30 minutes. Repeat for up to 3 days. This helps reduce swelling.    Compression: To reduce swelling and keep the joint stable, you may need to wrap the ankle with an elastic bandage. For more severe sprains, you may need an ankle brace or a cast.    Elevation: To reduce swelling, keep your ankle raised above your heart when you sit or lie down.  Medicine  Your healthcare provider may suggest oral non-steroidal anti-inflammatory medicine (NSAIDs), such as ibuprofen. This relieves the pain and helps reduce any swelling. Be sure to take your medicine as  directed.  Contrast baths  After 3 days, soak your ankle in warm water for 30 seconds, then in cool water for 30 seconds. Go back and forth for 5 minutes. Doing this every 2 hours will help keep the swelling down.  Exercises    After about 2 to 3 weeks, you may be given exercises to strengthen the ligaments and muscles in the ankle. Doing these exercises will help prevent another ankle sprain. Exercises may include standing on your toes and then on your heels and doing ankle curls.  Ankle curls    Sit on the edge of a sturdy table or lie on your back.    Pull your toes toward you. Then point them away from you. Repeat for 2 to 3 minutes.   Date Last Reviewed: 9/28/2015 2000-2017 [employer]. [address] [place] [address], [place] [address]. All rights reserved. This information is not intended as a substitute for professional medical care. Always follow your healthcare professional's instructions.                Follow-ups after your visit        Who to contact     If you have questions or need follow up information about today's clinic visit or your schedule please contact [facility] directly at [phone].  Normal or non-critical lab and imaging results will be communicated to you by PxRadiahart, letter or phone within 4 business days after the clinic has received the results. If you do not hear from us within 7 days, please contact the clinic through Rsync.nett or phone. If you have a critical or abnormal lab result, we will notify you by phone as soon as possible.  Submit refill requests through BTIG or call your pharmacy and they will forward the refill request to us. Please allow 3 business days for your refill to be completed.          Additional Information About Your Visit        PxRadiaharSunPods Information     BTIG gives you secure access to your electronic health record. If you see a primary care provider, you can also send messages to your care team and make appointments. If you have  "questions, please call your primary care clinic.  If you do not have a primary care provider, please call [phone] and they will assist you.        Care EveryWhere ID     This is your Care EveryWhere ID. This could be used by other organizations to access your [place] medical records  [id number]        Your Vitals Were     Pulse Temperature Respirations Height Pulse Oximetry BMI (Body Mass Index)    90 99.3  F (37.4  C) (Temporal) 14 5' 6.53\" (1.69 m) 99% 20.33 kg/m2       Blood Pressure from Last 3 Encounters:   02/09/18 132/72   01/21/18 146/82   06/22/17 102/74    Weight from Last 3 Encounters:   02/09/18 128 lb (58.1 kg)   06/22/17 114 lb (51.7 kg)   05/18/17 118 lb (53.5 kg)               Primary Care Provider Office Phone # Fax #    Galindo Esparza [phone]        [facility] [address] [place] [address]        Equal Access to Services     [facility]: Hadii herber ku hadasho S[place]lie, waaxda luqadaha, qaybta kaalmada adeefrainyasrinivasa, johnson miranda . So [facility] [phone].    ATENCIÓN: Si habla español, tiene a allison disposición servicios gratuitos de asistencia lingüística. Llame al [phone].    We comply with applicable federal civil rights laws and [place] laws. We do not discriminate on the basis of race, color, national origin, age, disability, sex, sexual orientation, or gender identity.            Thank you!     Thank you for choosing [facility]  for your care. Our goal is always to provide you with excellent care. Hearing back from our patients is one way we can continue to improve our services. Please take a few minutes to complete the written survey that you may receive in the mail after your visit with us. Thank you!             Your Updated Medication List - Protect others around you: Learn how to safely use, store and throw away your medicines at www.disposemymeds.org.          This list is accurate as of 2/9/18  3:29 PM.  Always " use your most recent med list.                   Brand Name Dispense Instructions for use Diagnosis    albuterol 108 (90 BASE) MCG/ACT Inhaler    PROAIR HFA/PROVENTIL HFA/VENTOLIN HFA    1 Inhaler    Inhale 2 puffs into the lungs every 6 hours as needed for shortness of breath / dyspnea or wheezing    Acute bronchitis, unspecified organism, Tobacco abuse       ASPIRIN PO           buPROPion 150 MG 24 hr tablet    WELLBUTRIN XL     Take 150 mg by mouth        losartan 25 MG tablet    COZAAR     Take 25 mg by mouth        triamcinolone 0.1 % lotion    KENALOG    60 mL    Apply topically 2 times daily To affected areas as needed    Rash

## 2018-02-10 ASSESSMENT — PATIENT HEALTH QUESTIONNAIRE - PHQ9: SUM OF ALL RESPONSES TO PHQ QUESTIONS 1-9: 1

## 2018-02-10 ASSESSMENT — ANXIETY QUESTIONNAIRES: GAD7 TOTAL SCORE: 0

## 2018-04-01 ENCOUNTER — MYC MEDICAL ADVICE (OUTPATIENT)
Dept: FAMILY MEDICINE | Facility: CLINIC | Age: 53
End: 2018-04-01

## 2018-04-01 DIAGNOSIS — G47.09 OTHER INSOMNIA: Primary | ICD-10-CM

## 2018-04-02 RX ORDER — HYDROXYZINE PAMOATE 25 MG/1
25-100 CAPSULE ORAL
Qty: 120 CAPSULE | Refills: 3 | Status: SHIPPED | OUTPATIENT
Start: 2018-04-02 | End: 2019-01-30

## 2018-04-02 NOTE — TELEPHONE ENCOUNTER
We can try something like Vistaril 25 mg caplets she can take 1-4 capsules 1 hour prior to going to bed.  This should help her relax and fall asleep yet still be able to get up and function.  It stays in her system for a good 6-8 hours so she takes it too late so be a little groggy in the morning.  Let us try that first and then if that is not working we can try something else.    Electronically signed by:  Galindo Esparza M.D.  4/2/2018

## 2018-04-03 ENCOUNTER — MYC MEDICAL ADVICE (OUTPATIENT)
Dept: FAMILY MEDICINE | Facility: CLINIC | Age: 53
End: 2018-04-03

## 2018-04-03 ENCOUNTER — TELEPHONE (OUTPATIENT)
Dept: FAMILY MEDICINE | Facility: CLINIC | Age: 53
End: 2018-04-03

## 2018-04-03 DIAGNOSIS — F41.1 GAD (GENERALIZED ANXIETY DISORDER): Primary | ICD-10-CM

## 2018-04-03 NOTE — TELEPHONE ENCOUNTER
We can stop her Wellbutrin and see if that makes a difference with her red eyes, but I am not certain that that would be the cause.  We can also restart her sertraline.  I will give her 50 mg tablet and she can start with one half tablet every evening for 2 weeks and then go up to 50 mg if needed.  For her sleeping, I think using Vistaril is a good option for her.  She should take it about an hour before she wants to go to bed and it will help her relax and fall asleep and stay asleep.  I will give her 25 mg capsules and she can take 1-4 tablets or  mg about 1 hour prior to wanting to go to sleep.  If she takes it too late in the evening it may make her groggy in the morning since it will be in her system for a full 6-8 hours.  That is why it is important to take it a good hour before she wants to lay down to fall asleep.  I have already sent the Vistaril over to the [facility] pharmacy for her.  And I just sent the sertraline now.    Electronically signed by:  Galindo Esparza M.D.  4/3/2018

## 2018-04-03 NOTE — TELEPHONE ENCOUNTER
Reason for call:  Patient reporting a symptom    Symptom or request: blood shot eyes all the time    Duration (how long have symptoms been present):     Have you been treated for this before? No    Additional comments: Pt is wondering if the Wellbutrin is causing this blood shot eyes? She googled it and side affects are blood shot eyes and insomnia. Please call and advise.     Phone Number patient can be reached at:  [phone]     Best Time:  anytime    Can we leave a detailed message on this number:  YES    Call taken on 4/3/2018 at 9:51 AM by Simran Hsieh

## 2018-04-03 NOTE — TELEPHONE ENCOUNTER
": 1965  PHONE #'s: [phone] (home) [phone] (work)    PRESENTING PROBLEM:  Patient would like to go back on her ZOLOFT. She is NOT liking the side effects of the WELLBUTRIN, even though it is helping her anxiety, it is NOT helping her quit smoking.  Has been on it since last 2017.     NURSING ASSESSMENT  Description:  My eyes always look dry and looks irritated. My skin has been weird since on it. I get bumpy rashes, looks kind of red and irritating. \"  [facility] to [facility] to show them. They checked me for Strep, but that was Neg.  I have seen the Ophthalmogist and was given eye drops for dry eyes. They don't really help. I have even changed mascara, etc and nothing makes a difference.  \"   Onset/duration:  For months now.  Precip. factors:  Etiology unknown.   Assoc. Sx:  \" I cannot sleep. I am tossing and turning all night. Dr. Esparza gave me something to use as a sleep aid, but I haven't picked it up yet. I am scared to take it with the weird Sx of the Wellbutrin. \"  Improves/worsens Sx:   Worse.   Pain scale (1-10)   0/10  Sx specific meds:  Wellbutrin 150 mg, Cozaar 25 mg.   LMP/preg/breast feeding:   NA  Last exam/Tx:  17 with Dr. Esparza     RECOMMENDED DISPOSITION:   Rn recommend that the see Dr. Esparza to sit down and discuss with him her concerns and Sx while on the Wellbutrin. \" Neto is a good friend of mine. I am a  and gone a lot. He can just call me and tell me want he recommends. \"   RN will forward message to Dr. Esparza to review. You can reach pt on her cell phone.  Will comply with recommendation: RN informed pt that Dr. Esparza has someone in labor so not sure if he will be able to get back to her today in a timely manner . Might not be able to respond to her until tomorrow.    If further questions/concerns or if Sx do not improve, worsen or new Sx develop, call your PCP or [facility] as soon as possible.    NOTES:  " Disposition was determined by the first positive assessment question, therefore all previous assessment questions were negative.  Informed to check provider manual or call insurance company to assure coverage.    Guideline used:  Wellbutrin  Clinical References    Tammi Alejo RN

## 2018-04-16 ENCOUNTER — ALLIED HEALTH/NURSE VISIT (OUTPATIENT)
Dept: FAMILY MEDICINE | Facility: OTHER | Age: 53
End: 2018-04-16
Payer: COMMERCIAL

## 2018-04-16 DIAGNOSIS — Z23 ENCOUNTER FOR IMMUNIZATION: Primary | ICD-10-CM

## 2018-04-16 PROCEDURE — 99207 ZZC NO CHARGE LOS: CPT

## 2018-04-16 PROCEDURE — 90750 HZV VACC RECOMBINANT IM: CPT

## 2018-04-16 PROCEDURE — 90471 IMMUNIZATION ADMIN: CPT

## 2018-07-31 ENCOUNTER — TELEPHONE (OUTPATIENT)
Dept: FAMILY MEDICINE | Facility: CLINIC | Age: 53
End: 2018-07-31

## 2018-07-31 NOTE — TELEPHONE ENCOUNTER
Reason for Call:  Other shingles vaccine    Detailed comments: is wondering when she is due for her 2nd shingles vaccine    Phone Number Patient can be reached at: Cell number on file:    Telephone Information:   Mobile [phone]       Best Time: any    Can we leave a detailed message on this number? YES    Call taken on 7/31/2018 at 9:10 AM by Shira Coello

## 2018-07-31 NOTE — TELEPHONE ENCOUNTER
Patient can get the 2nd dose anywhere between 6/16/18 and 10/16/18 patient was notified.    Asuncion Ware MA 7/31/2018

## 2018-08-06 ENCOUNTER — ALLIED HEALTH/NURSE VISIT (OUTPATIENT)
Dept: FAMILY MEDICINE | Facility: OTHER | Age: 53
End: 2018-08-06
Payer: COMMERCIAL

## 2018-08-06 DIAGNOSIS — Z23 NEED FOR ZOSTER VACCINE: Primary | ICD-10-CM

## 2018-08-06 PROCEDURE — 90471 IMMUNIZATION ADMIN: CPT

## 2018-08-06 PROCEDURE — 90750 HZV VACC RECOMBINANT IM: CPT

## 2018-08-06 PROCEDURE — 99207 ZZC NO CHARGE NURSE ONLY: CPT

## 2018-08-06 NOTE — PROGRESS NOTES
Screening Questionnaire for Adult Immunization    Are you sick today?   No   Do you have allergies to medications, food, a vaccine component or latex?   No   Have you ever had a serious reaction after receiving a vaccination?   No   Do you have a long-term health problem with heart disease, lung disease, asthma, kidney disease, metabolic disease (e.g. diabetes), anemia, or other blood disorder?   No   Do you have cancer, leukemia, HIV/AIDS, or any other immune system problem?   No   In the past 3 months, have you taken medications that affect  your immune system, such as prednisone, other steroids, or anticancer drugs; drugs for the treatment of rheumatoid arthritis, Crohn s disease, or psoriasis; or have you had radiation treatments?   No   Have you had a seizure, or a brain or other nervous system problem?   No   During the past year, have you received a transfusion of blood or blood     products, or been given immune (gamma) globulin or antiviral drug?   No   For women: Are you pregnant or is there a chance you could become        pregnant during the next month?   No   Have you received any vaccinations in the past 4 weeks?   No     Immunization questionnaire answers were all negative.      Prior to injection verified patient identity using patient's name and date of birth.  Due to injection administration, patient instructed to remain in clinic for 15 minutes  afterwards, and to report any adverse reaction to me immediately.     Screening performed by Ann Gonzales on 8/6/2018 at 2:53 PM.

## 2018-08-06 NOTE — MR AVS SNAPSHOT
After Visit Summary   8/6/2018    Lorna Gorman    MRN: [id number]           Patient Information     Date Of Birth          1965        Visit Information        Provider Department      8/6/2018 2:45 PM [facility] B, [facility]        Today's Diagnoses     Need for zoster vaccine    -  1       Follow-ups after your visit        Who to contact     If you have questions or need follow up information about today's clinic visit or your schedule please contact [facility] directly at [phone].  Normal or non-critical lab and imaging results will be communicated to you by Pollsbhart, letter or phone within 4 business days after the clinic has received the results. If you do not hear from us within 7 days, please contact the clinic through Your Truman Showt or phone. If you have a critical or abnormal lab result, we will notify you by phone as soon as possible.  Submit refill requests through Syncurity or call your pharmacy and they will forward the refill request to us. Please allow 3 business days for your refill to be completed.          Additional Information About Your Visit        MyChart Information     Syncurity gives you secure access to your electronic health record. If you see a primary care provider, you can also send messages to your care team and make appointments. If you have questions, please call your primary care clinic.  If you do not have a primary care provider, please call [phone] and they will assist you.        Care EveryWhere ID     This is your Care EveryWhere ID. This could be used by other organizations to access your [place] medical records  [id number]         Blood Pressure from Last 3 Encounters:   02/09/18 132/72   01/21/18 146/82   06/22/17 102/74    Weight from Last 3 Encounters:   02/09/18 128 lb (58.1 kg)   06/22/17 114 lb (51.7 kg)   05/18/17 118 lb (53.5 kg)              We Performed the Following     INJECTION INTRAMUSCULAR OR SUB-Q      ZOSTER VACCINE RECOMBINANT ADJUVANTED IM NJX        Primary Care Provider Office Phone # Fax #    Galindo ALVERTO Esparza [phone]       [address] [place] [address]        Equal Access to Services     [facility] : Hadii ehrber ku hadelviso Soomaali, waaxda luqadaha, qaybta kaalmada adeegyada, waxcherelle sarahin hayaan adeefrain roman ruth plascencia. So [facility] [phone].    ATENCIÓN: Si habla español, tiene a allison disposición servicios gratuitos de asistencia lingüística. Llame al [phone].    We comply with applicable federal civil rights laws and [place] laws. We do not discriminate on the basis of race, color, national origin, age, disability, sex, sexual orientation, or gender identity.            Thank you!     Thank you for choosing [facility]  for your care. Our goal is always to provide you with excellent care. Hearing back from our patients is one way we can continue to improve our services. Please take a few minutes to complete the written survey that you may receive in the mail after your visit with us. Thank you!             Your Updated Medication List - Protect others around you: Learn how to safely use, store and throw away your medicines at www.disposemymeds.org.          This list is accurate as of 8/6/18  2:56 PM.  Always use your most recent med list.                   Brand Name Dispense Instructions for use Diagnosis    albuterol 108 (90 Base) MCG/ACT Inhaler    PROAIR HFA/PROVENTIL HFA/VENTOLIN HFA    1 Inhaler    Inhale 2 puffs into the lungs every 6 hours as needed for shortness of breath / dyspnea or wheezing    Acute bronchitis, unspecified organism, Tobacco abuse       ASPIRIN PO           buPROPion 150 MG 24 hr tablet    WELLBUTRIN XL     Take 150 mg by mouth        hydrOXYzine 25 MG capsule    VISTARIL    120 capsule    Take 1-4 capsules ( mg) by mouth nightly as needed (insomnia)    Other insomnia       losartan 25 MG tablet    COZAAR     Take 25  mg by mouth        sertraline 50 MG tablet    ZOLOFT    90 tablet    1/2 tab (25 mg) po daily for 2 wks then may increase to 1 tab (50 mg) if needed    TITA (generalized anxiety disorder)       triamcinolone 0.1 % lotion    KENALOG    60 mL    Apply topically 2 times daily To affected areas as needed    Rash

## 2018-10-18 ENCOUNTER — ALLIED HEALTH/NURSE VISIT (OUTPATIENT)
Dept: FAMILY MEDICINE | Facility: OTHER | Age: 53
End: 2018-10-18
Payer: COMMERCIAL

## 2018-10-18 DIAGNOSIS — Z23 NEED FOR TD VACCINE: ICD-10-CM

## 2018-10-18 DIAGNOSIS — Z23 NEED FOR PROPHYLACTIC VACCINATION AND INOCULATION AGAINST INFLUENZA: Primary | ICD-10-CM

## 2018-10-18 PROCEDURE — 90472 IMMUNIZATION ADMIN EACH ADD: CPT

## 2018-10-18 PROCEDURE — 90471 IMMUNIZATION ADMIN: CPT

## 2018-10-18 PROCEDURE — 99207 ZZC NO CHARGE NURSE ONLY: CPT

## 2018-10-18 PROCEDURE — 90682 RIV4 VACC RECOMBINANT DNA IM: CPT

## 2018-10-18 PROCEDURE — 90714 TD VACC NO PRESV 7 YRS+ IM: CPT

## 2018-10-18 NOTE — PROGRESS NOTES
Injectable Influenza Immunization Documentation    1.  Is the person to be vaccinated sick today?   No    2. Does the person to be vaccinated have an allergy to a component   of the vaccine?   No  Egg Allergy Algorithm Link    3. Has the person to be vaccinated ever had a serious reaction   to influenza vaccine in the past?   No    4. Has the person to be vaccinated ever had Guillain-Barré syndrome?   No    Form completed by Devika Armenta, LIDNA     Screening Questionnaire for Adult Immunization    Are you sick today?   No   Do you have allergies to medications, food, a vaccine component or latex?   No   Have you ever had a serious reaction after receiving a vaccination?   No   Do you have a long-term health problem with heart disease, lung disease, asthma, kidney disease, metabolic disease (e.g. diabetes), anemia, or other blood disorder?   No   Do you have cancer, leukemia, HIV/AIDS, or any other immune system problem?   No   In the past 3 months, have you taken medications that affect  your immune system, such as prednisone, other steroids, or anticancer drugs; drugs for the treatment of rheumatoid arthritis, Crohn s disease, or psoriasis; or have you had radiation treatments?   No   Have you had a seizure, or a brain or other nervous system problem?   No   During the past year, have you received a transfusion of blood or blood     products, or been given immune (gamma) globulin or antiviral drug?   No   For women: Are you pregnant or is there a chance you could become        pregnant during the next month?   No   Have you received any vaccinations in the past 4 weeks?   No     Immunization questionnaire answers were all negative.        Per orders of Dr. Esparza, injection of Td given by Devika Armenta. Patient instructed to remain in clinic for 15 minutes afterwards, and to report any adverse reaction to me immediately.      Screening performed by Devika Armenta on 10/18/2018 at 9:52 AM.

## 2018-10-18 NOTE — MR AVS SNAPSHOT
After Visit Summary   10/18/2018    Lorna Gorman    MRN: [id number]           Patient Information     Date Of Birth          1965        Visit Information        Provider Department      10/18/2018 10:00 AM NL FLOAT TEAM B, [facility]        Today's Diagnoses     Need for prophylactic vaccination and inoculation against influenza    -  1    Need for Td vaccine           Follow-ups after your visit        Your next 10 appointments already scheduled     Oct 30, 2018 10:30 AM CDT   (Arrive by 10:15 AM)   MA SCREENING DIGITAL BILATERAL with PHMA1   [facility] ([facility])    [address] [place] [id number] [phone]           How do I prepare for my exam? (Food and drink instructions) No Food and Drink Restrictions.  How do I prepare for my exam? (Other instructions) Do not use any powder, lotion or deodorant under your arms or on your breast. If you do, we will ask you to remove it before your exam.  What should I wear: Wear comfortable, two-piece clothing.  How long does the exam take: Most scans will take 15 minutes.  What should I bring: Bring any previous mammograms from other facilities or have them mailed to the breast center.  Do I need a :  No  is needed.  What do I need to tell my doctor: If you have any allergies, tell your care team.  What should I do after the exam: No restrictions, You may resume normal activities.  What is this test: This test is an x-ray of the breast to look for breast disease. The breast is pressed between two plates to flatten and spread the tissue. An X-ray is taken of the breast from different angles.  Who should I call with questions: If you have any questions, please call the Imaging Department where you will have your exam. Directions, parking instructions, and other information is available on our website, [email or web address].  Other information about my exam Three-dimensional  "(3D) mammograms are available at [facility] locations in [facility], [facility], [place], [facility] and [place].  Health locations include [place] and [facility] in [place].  Benefits of 3D mammograms include * Improved rate of cancer detection * Decreases your chance of having to go back for more tests, which means fewer: * \"False-positive\" results (This means that there is an abnormal area but it isn't cancer.) * Invasive testing procedures, such as a biopsy or surgery * Can provide clearer images of the breast if you have dense breast tissue.  *3D mammography is an optional exam that anyone can have with a 2D mammogram. It doesn't replace or take the place of a 2D mammogram. 2D mammograms remain an effective screening test for all women.  Not all insurance companies cover the cost of a 3D mammogram. Check with your insurance. Three-dimensional (3D) mammograms are available at [facility] locations in [facility], [facility], [facility], and [place]. Health locations include [place] and [facility] in [place]. Benefits of 3D mammograms include: - Improved rate of cancer detection - Decreases your chance of having to go back for more tests, which means fewer: - \"False-positive\" results (This means that there is an abnormal area but it isn't cancer.) - Invasive testing procedures, such as a biopsy or surgery - Can provide clearer images of the breast if you have dense breast tissue. 3D mammography is an optional exam that anyone can have with a 2D mammogram. It doesn't replace or take the place of a 2D mammogram. 2D mammograms remain an effective screening test for all women.  Not all insurance companies cover the cost of a 3D mammogram. Check with your insurance.              Future tests that were ordered for you today     Open Future Orders        Priority Expected Expires Ordered    MA " Screening Digital Bilateral Routine  10/17/2019 10/17/2018            Who to contact     If you have questions or need follow up information about today's clinic visit or your schedule please contact [facility] directly at [phone].  Normal or non-critical lab and imaging results will be communicated to you by MyChart, letter or phone within 4 business days after the clinic has received the results. If you do not hear from us within 7 days, please contact the clinic through MyChart or phone. If you have a critical or abnormal lab result, we will notify you by phone as soon as possible.  Submit refill requests through [employer] or call your pharmacy and they will forward the refill request to us. Please allow 3 business days for your refill to be completed.          Additional Information About Your Visit        Social Media [employer] Information     [employer] gives you secure access to your electronic health record. If you see a primary care provider, you can also send messages to your care team and make appointments. If you have questions, please call your primary care clinic.  If you do not have a primary care provider, please call [phone] and they will assist you.        Care EveryWhere ID     This is your Care EveryWhere ID. This could be used by other organizations to access your [facility] medical records  [id number]         Blood Pressure from Last 3 Encounters:   02/09/18 132/72   01/21/18 146/82   06/22/17 102/74    Weight from Last 3 Encounters:   02/09/18 128 lb (58.1 kg)   06/22/17 114 lb (51.7 kg)   05/18/17 118 lb (53.5 kg)              We Performed the Following     FLU VACCINE, (RIV4) RECOMBINANT HA  , IM (FluBlok, egg free) [24306]- >18 YRS (FMG recommended  50-64 YRS)     TD PRESERV FREE, IM (7+ YRS)     Vaccine Administration, Initial [51363]        Primary Care Provider Office Phone # Fax #    Galindo Esparza [phone] [address] [place] [phone]         Equal Access to Services     [facility] : Hadii aad ku hadelvisabbie Nikitaali, waslimda luqadaha, qaybta kalewsrinivasa peña, johnson plascencia. So [facility] [phone].    ATENCIÓN: Si habla anitha, tiene a allison disposición servicios gratuitos de asistencia lingüística. Humberto al [phone].    We comply with applicable federal civil rights laws and [place] laws. We do not discriminate on the basis of race, color, national origin, age, disability, sex, sexual orientation, or gender identity.            Thank you!     Thank you for choosing [facility]  for your care. Our goal is always to provide you with excellent care. Hearing back from our patients is one way we can continue to improve our services. Please take a few minutes to complete the written survey that you may receive in the mail after your visit with us. Thank you!             Your Updated Medication List - Protect others around you: Learn how to safely use, store and throw away your medicines at www.disposemymeds.org.          This list is accurate as of 10/18/18 10:02 AM.  Always use your most recent med list.                   Brand Name Dispense Instructions for use Diagnosis    albuterol 108 (90 Base) MCG/ACT inhaler    PROAIR HFA/PROVENTIL HFA/VENTOLIN HFA    1 Inhaler    Inhale 2 puffs into the lungs every 6 hours as needed for shortness of breath / dyspnea or wheezing    Acute bronchitis, unspecified organism, Tobacco abuse       ASPIRIN PO           buPROPion 150 MG 24 hr tablet    WELLBUTRIN XL     Take 150 mg by mouth        hydrOXYzine 25 MG capsule    VISTARIL    120 capsule    Take 1-4 capsules ( mg) by mouth nightly as needed (insomnia)    Other insomnia       losartan 25 MG tablet    COZAAR     Take 25 mg by mouth        sertraline 50 MG tablet    ZOLOFT    90 tablet    1/2 tab (25 mg) po daily for 2 wks then may increase to 1 tab (50 mg) if needed    TITA (generalized anxiety disorder)        triamcinolone 0.1 % lotion    KENALOG    60 mL    Apply topically 2 times daily To affected areas as needed    Rash

## 2018-10-30 ENCOUNTER — HOSPITAL ENCOUNTER (OUTPATIENT)
Dept: MAMMOGRAPHY | Facility: CLINIC | Age: 53
Discharge: HOME | End: 2018-10-30
Attending: FAMILY MEDICINE | Admitting: FAMILY MEDICINE
Payer: COMMERCIAL

## 2018-10-30 DIAGNOSIS — Z12.31 VISIT FOR SCREENING MAMMOGRAM: ICD-10-CM

## 2018-10-30 PROCEDURE — 77067 SCR MAMMO BI INCL CAD: CPT

## 2018-12-02 ENCOUNTER — TELEPHONE (OUTPATIENT)
Dept: FAMILY MEDICINE | Facility: CLINIC | Age: 53
End: 2018-12-02

## 2018-12-02 DIAGNOSIS — I10 BENIGN ESSENTIAL HYPERTENSION: ICD-10-CM

## 2018-12-04 NOTE — TELEPHONE ENCOUNTER
"Losartan  Last office visit: 06/22/2017 with prescribing provider:  Isaias   Future Office Visit:  None  Routing refill request to provider for review/approval because:  Medication is reported/historical    Requested Prescriptions   Pending Prescriptions Disp Refills     losartan (COZAAR) 25 MG tablet [Pharmacy Med Name: Losartan Potassium Oral Tablet 25 MG] 30 tablet 0     Sig: Take 1 tablet (25 mg) by mouth daily    Angiotensin-II Receptors Failed    12/2/2018  7:01 AM       Failed - Recent (12 mo) or future (30 days) visit within the authorizing provider's specialty    Patient had office visit in the last 12 months or has a visit in the next 30 days with authorizing provider or within the authorizing provider's specialty.  See \"Patient Info\" tab in inbasket, or \"Choose Columns\" in Meds & Orders section of the refill encounter.         Failed - Normal serum creatinine on file in past 12 months    Recent Labs   Lab Test  06/22/17   0852   CR  0.75          Failed - Normal serum potassium on file in past 12 months    Recent Labs   Lab Test  06/22/17   0852   POTASSIUM  4.3          Passed - Blood pressure under 140/90 in past 12 months    BP Readings from Last 3 Encounters:   02/09/18 132/72   01/21/18 146/82   06/22/17 102/74          Passed - Patient is age 18 or older       Passed - No active pregnancy on record       Passed - No positive pregnancy test in past 12 months      Sarah Hudson RN   "

## 2018-12-05 RX ORDER — LOSARTAN POTASSIUM 25 MG/1
TABLET ORAL
Qty: 60 TABLET | Refills: 0 | Status: SHIPPED | OUTPATIENT
Start: 2018-12-05 | End: 2019-01-29

## 2018-12-05 NOTE — TELEPHONE ENCOUNTER
Patient is a follow-up appointment for blood pressure and labs.  Please call to schedule this.    Electronically signed by:  Galindo Esparza M.D.  12/5/2018

## 2019-01-29 DIAGNOSIS — I10 BENIGN ESSENTIAL HYPERTENSION: ICD-10-CM

## 2019-01-29 RX ORDER — LOSARTAN POTASSIUM 25 MG/1
TABLET ORAL
Qty: 90 TABLET | Refills: 3 | Status: SHIPPED | OUTPATIENT
Start: 2019-01-29 | End: 2019-01-30

## 2019-01-29 NOTE — TELEPHONE ENCOUNTER
"Cozaar  Last Written Prescription Date:  12/05/2018  Last Fill Quantity: 60,  # refills: 0   Last office visit: 10/18/2018 with prescribing provider:  Isaias   Future Office Visit:   Next 5 appointments (look out 90 days)    Jan 30, 2019  2:00 PM CST  Office Visit with Galindo Esparza MD  [facility] [address] [place] [id number] [phone]      Routing refill request to provider for review/approval because:  Astrid given x1 and patient did not follow up, please advise.  Patient didn't complete labs, however does have upcoming appointment with PCP.     Requested Prescriptions   Pending Prescriptions Disp Refills     losartan (COZAAR) 25 MG tablet [Pharmacy Med Name: Losartan Potassium Oral Tablet 25 MG] 30 tablet 0     Sig: Take 1 tablet (25 mg) by mouth daily    Angiotensin-II Receptors Failed - 1/29/2019  7:01 AM       Failed - Normal serum creatinine on file in past 12 months    Recent Labs   Lab Test 06/22/17  0852   CR 0.75          Failed - Normal serum potassium on file in past 12 months    Recent Labs   Lab Test 06/22/17  0852   POTASSIUM 4.3          Passed - Blood pressure under 140/90 in past 12 months    BP Readings from Last 3 Encounters:   02/09/18 132/72   01/21/18 146/82   06/22/17 102/74          Passed - Recent (12 mo) or future (30 days) visit within the authorizing provider's specialty    Patient had office visit in the last 12 months or has a visit in the next 30 days with authorizing provider or within the authorizing provider's specialty.  See \"Patient Info\" tab in inbasket, or \"Choose Columns\" in Meds & Orders section of the refill encounter.         Passed - Medication is active on med list       Passed - Patient is age 18 or older       Passed - No active pregnancy on record       Passed - No positive pregnancy test in past 12 months      Sarah Hudson RN   "

## 2019-01-30 ENCOUNTER — TELEPHONE (OUTPATIENT)
Dept: FAMILY MEDICINE | Facility: CLINIC | Age: 54
End: 2019-01-30

## 2019-01-30 ENCOUNTER — OFFICE VISIT (OUTPATIENT)
Dept: FAMILY MEDICINE | Facility: CLINIC | Age: 54
End: 2019-01-30
Payer: COMMERCIAL

## 2019-01-30 VITALS
RESPIRATION RATE: 18 BRPM | SYSTOLIC BLOOD PRESSURE: 144 MMHG | TEMPERATURE: 97.9 F | DIASTOLIC BLOOD PRESSURE: 92 MMHG | HEART RATE: 84 BPM | OXYGEN SATURATION: 96 % | BODY MASS INDEX: 20.33 KG/M2 | WEIGHT: 128 LBS

## 2019-01-30 DIAGNOSIS — I10 BENIGN ESSENTIAL HYPERTENSION: ICD-10-CM

## 2019-01-30 DIAGNOSIS — F41.1 GAD (GENERALIZED ANXIETY DISORDER): ICD-10-CM

## 2019-01-30 LAB
ANION GAP SERPL CALCULATED.3IONS-SCNC: 7 MMOL/L (ref 3–14)
BUN SERPL-MCNC: 27 MG/DL (ref 7–30)
CALCIUM SERPL-MCNC: 9.1 MG/DL (ref 8.5–10.1)
CHLORIDE SERPL-SCNC: 103 MMOL/L (ref 94–109)
CO2 SERPL-SCNC: 30 MMOL/L (ref 20–32)
CREAT SERPL-MCNC: 0.84 MG/DL (ref 0.52–1.04)
CREAT UR-MCNC: 219 MG/DL
GFR SERPL CREATININE-BSD FRML MDRD: 79 ML/MIN/{1.73_M2}
GLUCOSE SERPL-MCNC: 104 MG/DL (ref 70–99)
MICROALBUMIN UR-MCNC: 32 MG/L
MICROALBUMIN/CREAT UR: 14.47 MG/G CR (ref 0–25)
POTASSIUM SERPL-SCNC: 4.1 MMOL/L (ref 3.4–5.3)
SODIUM SERPL-SCNC: 140 MMOL/L (ref 133–144)

## 2019-01-30 PROCEDURE — 36415 COLL VENOUS BLD VENIPUNCTURE: CPT | Performed by: FAMILY MEDICINE

## 2019-01-30 PROCEDURE — 99214 OFFICE O/P EST MOD 30 MIN: CPT | Performed by: FAMILY MEDICINE

## 2019-01-30 PROCEDURE — 80048 BASIC METABOLIC PNL TOTAL CA: CPT | Performed by: FAMILY MEDICINE

## 2019-01-30 PROCEDURE — 82043 UR ALBUMIN QUANTITATIVE: CPT | Performed by: FAMILY MEDICINE

## 2019-01-30 RX ORDER — FLUOXETINE 10 MG/1
10 TABLET, FILM COATED ORAL DAILY
Qty: 90 TABLET | Refills: 3 | Status: SHIPPED | OUTPATIENT
Start: 2019-01-30 | End: 2019-11-26

## 2019-01-30 RX ORDER — LOSARTAN POTASSIUM 50 MG/1
50 TABLET ORAL DAILY
Qty: 90 TABLET | Refills: 3 | Status: SHIPPED | OUTPATIENT
Start: 2019-01-30 | End: 2020-01-22

## 2019-01-30 ASSESSMENT — ANXIETY QUESTIONNAIRES
1. FEELING NERVOUS, ANXIOUS, OR ON EDGE: NEARLY EVERY DAY
7. FEELING AFRAID AS IF SOMETHING AWFUL MIGHT HAPPEN: SEVERAL DAYS
GAD7 TOTAL SCORE: 16
3. WORRYING TOO MUCH ABOUT DIFFERENT THINGS: NEARLY EVERY DAY
5. BEING SO RESTLESS THAT IT IS HARD TO SIT STILL: MORE THAN HALF THE DAYS
IF YOU CHECKED OFF ANY PROBLEMS ON THIS QUESTIONNAIRE, HOW DIFFICULT HAVE THESE PROBLEMS MADE IT FOR YOU TO DO YOUR WORK, TAKE CARE OF THINGS AT HOME, OR GET ALONG WITH OTHER PEOPLE: SOMEWHAT DIFFICULT
6. BECOMING EASILY ANNOYED OR IRRITABLE: SEVERAL DAYS
2. NOT BEING ABLE TO STOP OR CONTROL WORRYING: NEARLY EVERY DAY

## 2019-01-30 ASSESSMENT — PATIENT HEALTH QUESTIONNAIRE - PHQ9: 5. POOR APPETITE OR OVEREATING: NEARLY EVERY DAY

## 2019-01-30 ASSESSMENT — PAIN SCALES - GENERAL: PAINLEVEL: NO PAIN (0)

## 2019-01-30 NOTE — TELEPHONE ENCOUNTER
Lorna calls back to check on message she left and Dr De La Torre message is relayed to her.  Lorna states she will here around 4 for sure.

## 2019-01-30 NOTE — TELEPHONE ENCOUNTER
Pt calling. She is scheduled to see you at 2PM. She is a  and will not be back by then. She is wondering if you may be able to work her in later today? As late as possible? Please call.  Thank you,  Suma Langley- Pt Rep.

## 2019-01-30 NOTE — PROGRESS NOTES
SUBJECTIVE:   Lorna Gorman is a 53 year old female who presents to clinic today for the following health issues:      Hypertension Follow-up      Outpatient blood pressures are being checked at home.  Results are High.    Low Salt Diet: not monitoring salt      Amount of exercise or physical activity: None    Problems taking medications regularly: No    Medication side effects: none    Diet: regular (no restrictions)        PROBLEMS TO ADD ON...  She has been undergoing a lot more stress in her home.  She and her  really do not have much of the marriage but that has been going on for years.  Her son flew to [place] and got  unannounced to a woman that he had been dating for a very short period of time.  He really [place] at 9 essentially told his family to screw off if they were not happy for him and it really hurt her a lot.  Her oldest daughter just found out that her fiancé was having an affair with another woman so that is causing stress and her life also and her youngest daughter is dating somebody who is just recently  his wife had an affair on him.  She just feels so stressed out with all the stress that her kids are going through and her TITA 7 today was elevated at 16 today.  She has not been on a medication for anxiety depression for a number of years would like to go back on something.    TITA-7 SCORE 6/22/2017 2/9/2018 1/30/2019   Total Score - - -   Total Score - 0 (minimal anxiety) -   Total Score 3 0 16         Problem list and histories reviewed & adjusted, as indicated.  Additional history: as documented    Patient Active Problem List   Diagnosis     Tobacco abuse     CARDIOVASCULAR SCREENING; LDL GOAL LESS THAN 160     TITA (generalized anxiety disorder)     Benign essential hypertension     Hypertension goal BP (blood pressure) < 140/90     Past Surgical History:   Procedure Laterality Date     C ANESTH,NOSE,SINUS SURGERY  2000     C RESEC HEAD OF PHALANX,TOE  2003      "COLONOSCOPY N/A 2015    Procedure: COLONOSCOPY;  Surgeon: Ritesh Chiu MD;  Location:  GI      HYSTEROSCOPY, SURGICAL; W/ ENDOMETRIAL ABLATION, ANY METHOD  09       Social History     Tobacco Use     Smoking status: Current Some Day Smoker     Packs/day: 0.25     Years: 7.00     Pack years: 1.75     Types: Cigarettes     Start date: 2010     Smokeless tobacco: Never Used     Tobacco comment: 3 cigarettes/day (17)   Substance Use Topics     Alcohol use: Yes     Alcohol/week: 4.2 oz     Types: 7 Glasses of wine per week     Comment: \"here and there\"     Family History   Problem Relation Age of Onset     Myocardial Infarction Father 40         at age 40     Hypertension Father      Lipids Father      C.A.D. Father          at age 40 from an MI     Arthritis Mother      Eye Disorder Mother         glaucoma     Genitourinary Problems Mother         hysterectomy     Hypertension Mother      Cardiovascular Maternal Grandmother      Eye Disorder Maternal Grandmother         cataracts     Heart Disease Maternal Grandmother      Hypertension Maternal Grandmother      Cardiovascular Maternal Grandfather      Hypertension Maternal Grandfather      Cardiovascular Paternal Grandmother      Hypertension Paternal Grandmother      Cardiovascular Paternal Grandfather      Hypertension Paternal Grandfather      Musculoskeletal Disorder Paternal Uncle         MS         Allergies   Allergen Reactions     No Known Allergies      Ragweeds      Recent Labs   Lab Test 19  1535 17  0852 16  1307 14  1505 13  1132 10/23/12  1203   LDL  --   --   --  89 98 112   HDL  --   --   --  168 132* 154*   TRIG  --   --   --  62 53 80   ALT  --   --  22  --   --   --    CR 0.84 0.75 0.76  --   --  0.76   GFRESTIMATED 79 81 80  --   --  81   GFRESTBLACK >90 >90   GFR Calc   >90   GFR Calc    --   --  >90   POTASSIUM 4.1 4.3 3.3*  --   --  3.9   TSH  --   --   " --   --   --  1.47      BP Readings from Last 3 Encounters:   01/30/19 (!) 144/92   02/09/18 132/72   01/21/18 146/82    Wt Readings from Last 3 Encounters:   01/30/19 58.1 kg (128 lb)   02/09/18 58.1 kg (128 lb)   06/22/17 51.7 kg (114 lb)                  Labs reviewed in EPIC    Reviewed and updated as needed this visit by clinical staff  Tobacco  Allergies  Meds       Reviewed and updated as needed this visit by Provider         ROS:  Constitutional, HEENT, cardiovascular, pulmonary, gi and gu systems are negative, except as otherwise noted.    OBJECTIVE:     BP (!) 144/92   Pulse 84   Temp 97.9  F (36.6  C) (Temporal)   Resp 18   Wt 58.1 kg (128 lb)   SpO2 96%   BMI 20.33 kg/m    Body mass index is 20.33 kg/m .  GENERAL: healthy, alert and no distress  RESP: lungs clear to auscultation - no rales, rhonchi or wheezes  CV: regular rate and rhythm, normal S1 S2, no S3 or S4, no murmur, click or rub, no peripheral edema and peripheral pulses strong  MS: no gross musculoskeletal defects noted, no edema    Diagnostic Test Results:  Results for orders placed or performed in visit on 01/30/19 (from the past 24 hour(s))   Basic metabolic panel   Result Value Ref Range    Sodium 140 133 - 144 mmol/L    Potassium 4.1 3.4 - 5.3 mmol/L    Chloride 103 94 - 109 mmol/L    Carbon Dioxide 30 20 - 32 mmol/L    Anion Gap 7 3 - 14 mmol/L    Glucose 104 (H) 70 - 99 mg/dL    Urea Nitrogen 27 7 - 30 mg/dL    Creatinine 0.84 0.52 - 1.04 mg/dL    GFR Estimate 79 >60 mL/min/[1.73_m2]    GFR Estimate If Black >90 >60 mL/min/[1.73_m2]    Calcium 9.1 8.5 - 10.1 mg/dL   Albumin Random Urine Quantitative with Creat Ratio   Result Value Ref Range    Creatinine Urine 219 mg/dL    Albumin Urine mg/L 32 mg/L    Albumin Urine mg/g Cr 14.47 0 - 25 mg/g Cr       ASSESSMENT/PLAN:   (I10) Benign essential hypertension  Comment: Blood pressure still not well controlled on the 25 losartan.  Plan: losartan (COZAAR) 50 MG tablet        Were  "going to increase this to 50 mg daily and see her back in 2 months.  She has not had any problems with taking the medication or side effects.    (F41.1) TITA (generalized anxiety disorder)  Comment: Her anxiety level is elevated with her TITA 7 of 16 today.  Most of this is family stress and some work stress.  Plan: FLUoxetine (PROZAC) 10 MG tablet        ) 10 mg of fluoxetine every evening and then we will see her back in 2 months for recheck of her anxiety when we see her for her blood pressure.         Tobacco Cessation:   reports that she has been smoking cigarettes.  She started smoking about 9 years ago. She has a 1.75 pack-year smoking history. she has never used smokeless tobacco.  Tobacco Cessation Action Plan: Information offered: Patient not interested at this time    BMI:   Estimated body mass index is 20.33 kg/m  as calculated from the following:    Height as of 2/9/18: 1.69 m (5' 6.54\").    Weight as of this encounter: 58.1 kg (128 lb).     Electronically signed by:  Galindo Esparza M.D.  1/30/2019    "

## 2019-01-30 NOTE — TELEPHONE ENCOUNTER
Can just call her and find out what time she could be here?  My last patient is at 4 need to be here by 5 so she could be here is close to 4 is possible that would work.    Electronically signed by:  Galindo Esparza M.D.  1/30/2019

## 2019-01-31 ASSESSMENT — ANXIETY QUESTIONNAIRES: GAD7 TOTAL SCORE: 16

## 2019-03-01 ENCOUNTER — TELEPHONE (OUTPATIENT)
Dept: FAMILY MEDICINE | Facility: CLINIC | Age: 54
End: 2019-03-01

## 2019-03-01 NOTE — TELEPHONE ENCOUNTER
Message     ----- Message from [employer] sent at 3/1/2019  2:23 PM CST -----      Appointment Request From: Lorna Gorman      With Provider: Galindo Esparza MD, MD [-Primary Care Physician-]      Preferred Date Range: From 3/28/2019 To 3/29/2019      Preferred Times: Thursday Afternoon, Friday Afternoon      Reason: To address the following health maintenance concerns.   Preventive Care Visit      Comments:   I am to be back in for a blood pressure check.

## 2019-03-05 NOTE — TELEPHONE ENCOUNTER
Called patient and left message to call the clinic back.  MJP/MA    We need to get patient in appt with .

## 2019-03-21 NOTE — TELEPHONE ENCOUNTER
Lorna calls back and states the 04/01 appointment is not going to work for her.  She is wondering if there is a way to see her on Thursday 04/04 after 10am sometime.      Lorna is aware that Dr Esparza is not in until 04/01.

## 2019-04-04 ENCOUNTER — OFFICE VISIT (OUTPATIENT)
Dept: FAMILY MEDICINE | Facility: CLINIC | Age: 54
End: 2019-04-04
Payer: COMMERCIAL

## 2019-04-04 VITALS
TEMPERATURE: 98.2 F | WEIGHT: 125 LBS | OXYGEN SATURATION: 98 % | BODY MASS INDEX: 19.85 KG/M2 | DIASTOLIC BLOOD PRESSURE: 86 MMHG | RESPIRATION RATE: 18 BRPM | SYSTOLIC BLOOD PRESSURE: 138 MMHG | HEART RATE: 84 BPM

## 2019-04-04 DIAGNOSIS — F41.1 GAD (GENERALIZED ANXIETY DISORDER): ICD-10-CM

## 2019-04-04 DIAGNOSIS — I10 BENIGN ESSENTIAL HYPERTENSION: ICD-10-CM

## 2019-04-04 DIAGNOSIS — L30.1 DYSHYDROSIS: ICD-10-CM

## 2019-04-04 PROCEDURE — 99214 OFFICE O/P EST MOD 30 MIN: CPT | Performed by: FAMILY MEDICINE

## 2019-04-04 ASSESSMENT — PAIN SCALES - GENERAL: PAINLEVEL: NO PAIN (0)

## 2019-04-04 NOTE — PROGRESS NOTES
SUBJECTIVE:   Lorna Gorman is a 53 year old female who presents to clinic today for the following health issues:      Hypertension Follow-up      Outpatient blood pressures are being checked at home.  Results are higher per patient.    Low Salt Diet: not monitoring salt      Amount of exercise or physical activity: None    Problems taking medications regularly: No    Medication side effects: none    Diet: regular (no restrictions)        PROBLEMS TO ADD ON...  She has not been happy in her marriage for a number of years now.  She and her  are not sexually active her intimate in any way.  These essentially just coexist with each other.  I have talked about divorce but he is been hesitant to go forward with it.  She is miserable and really feels that this is part of her stress and feels like she is walking on egg shells all the time at home.  She [place] a lot of time at her [place] up near [facility] to be away from her .  All of her children are grown and out of the house.  Most of her children do agree that she needs to do something to get out of the marriage and be happier.    Regarding her medications she is tolerating this very well and does not have any side effects.  She has not been monitoring her salt intake and is not doing any regular exercising.  She works to days per week generally as a .    She does feel better on her fluoxetine but does feel that she gets excessive sweating which can be a side effect of that medication.    Problem list and histories reviewed & adjusted, as indicated.  Additional history: as documented    Patient Active Problem List   Diagnosis     Tobacco abuse     CARDIOVASCULAR SCREENING; LDL GOAL LESS THAN 160     TITA (generalized anxiety disorder)     Benign essential hypertension     Hypertension goal BP (blood pressure) < 140/90     Past Surgical History:   Procedure Laterality Date     C ANESTH,NOSE,SINUS SURGERY  2000     C RESEC HEAD OF  "PIETER,TOE       COLONOSCOPY N/A 2015    Procedure: COLONOSCOPY;  Surgeon: Ritesh Chiu MD;  Location:  GI     [facility] HYSTEROSCOPY, SURGICAL; W/ ENDOMETRIAL ABLATION, ANY METHOD  09       Social History     Tobacco Use     Smoking status: Current Some Day Smoker     Packs/day: 0.25     Years: 7.00     Pack years: 1.75     Types: Cigarettes     Start date: 2010     Smokeless tobacco: Never Used     Tobacco comment: 3 cigarettes/day (17)   Substance Use Topics     Alcohol use: Yes     Alcohol/week: 4.2 oz     Types: 7 Glasses of wine per week     Comment: \"here and there\"     Family History   Problem Relation Age of Onset     Myocardial Infarction Father 40         at age 40     Hypertension Father      Lipids Father      C.A.D. Father          at age 40 from an MI     Arthritis Mother      Eye Disorder Mother         glaucoma     Genitourinary Problems Mother         hysterectomy     Hypertension Mother      Cardiovascular Maternal Grandmother      Eye Disorder Maternal Grandmother         cataracts     Heart Disease Maternal Grandmother      Hypertension Maternal Grandmother      Cardiovascular Maternal Grandfather      Hypertension Maternal Grandfather      Cardiovascular Paternal Grandmother      Hypertension Paternal Grandmother      Cardiovascular Paternal Grandfather      Hypertension Paternal Grandfather      Musculoskeletal Disorder Paternal Uncle         MS         Allergies   Allergen Reactions     No Known Allergies      Ragweeds      Recent Labs   Lab Test 19  1535 17  0852 16  1307 14  1505 13  1132 10/23/12  1203   LDL  --   --   --  89 98 112   HDL  --   --   --  168 132* 154*   TRIG  --   --   --  62 53 80   ALT  --   --  22  --   --   --    CR 0.84 0.75 0.76  --   --  0.76   GFRESTIMATED 79 81 80  --   --  81   GFRESTBLACK >90 >90   GFR Calc   >90   GFR Calc    --   --  >90   POTASSIUM 4.1 4.3 3.3*  --   -- "  3.9   TSH  --   --   --   --   --  1.47      BP Readings from Last 3 Encounters:   04/04/19 138/86   01/30/19 (!) 144/92   02/09/18 132/72    Wt Readings from Last 3 Encounters:   04/04/19 56.7 kg (125 lb)   01/30/19 58.1 kg (128 lb)   02/09/18 58.1 kg (128 lb)                    Reviewed and updated as needed this visit by clinical staff  Tobacco  Allergies  Meds  Problems  Med Hx  Surg Hx       Reviewed and updated as needed this visit by Provider         ROS:  Constitutional, HEENT, cardiovascular, pulmonary, gi and gu systems are negative, except as otherwise noted.    OBJECTIVE:     /86   Pulse 84   Temp 98.2  F (36.8  C) (Temporal)   Resp 18   Wt 56.7 kg (125 lb)   SpO2 98%   BMI 19.85 kg/m    Body mass index is 19.85 kg/m .  GENERAL: healthy, alert and no distress  RESP: lungs clear to auscultation - no rales, rhonchi or wheezes  CV: regular rate and rhythm, normal S1 S2, no S3 or S4, no murmur, click or rub, no peripheral edema and peripheral pulses strong  MS: no gross musculoskeletal defects noted, no edema    Diagnostic Test Results:  none     ASSESSMENT/PLAN:   (I10) Benign essential hypertension  Comment: Blood pressure is better controlled on her current medication.  I think her blood pressures are somewhat elevated to due to the stress that she has at home.  Plan: We will continue on her current dose and will send refills to the pharmacy for her as needed.  I told her that we need to see her twice yearly for her blood pressure is, one could be during her yearly physical and the other would be just a blood pressure follow-up.    (F41.1) TITA (generalized anxiety disorder)  Comment: Anxiety is fairly well controlled on the fluoxetine but it is causing her to have some increased sweating and dyshidrosis.  Plan: She will continue with medication unchanged because she likes the way it makes her feel.  A lot of her stress and anxiety is due to her poor marriage and we talked about  " or .  She is going to contact an  and try to get the process started.    (L30.1) Dyshydrosis  Comment: Regarding the dyshidrosis I gave her a couple options to try and she is willing to try some Drysol  Plan: aluminum chloride (DRYSOL) 20 % external         solution        She is going to apply this to the armpits and the bottom of her feet at bedtime and then shower in the morning and apply her normal deodorant.  She would keep me posted on how this is working for her.      Tobacco Cessation:   reports that she has been smoking cigarettes.  She started smoking about 9 years ago. She has a 1.75 pack-year smoking history. she has never used smokeless tobacco.  Tobacco Cessation Action Plan: Information offered: Patient not interested at this time    BMI:   Estimated body mass index is 19.85 kg/m  as calculated from the following:    Height as of 2/9/18: 1.69 m (5' 6.54\").    Weight as of this encounter: 56.7 kg (125 lb).     Electronically signed by:  Galindo Esparza M.D.  4/4/2019    "

## 2019-09-25 ENCOUNTER — ALLIED HEALTH/NURSE VISIT (OUTPATIENT)
Dept: URGENT CARE | Facility: RETAIL CLINIC | Age: 54
End: 2019-09-25
Payer: COMMERCIAL

## 2019-09-25 DIAGNOSIS — Z23 NEED FOR PROPHYLACTIC VACCINATION AND INOCULATION AGAINST INFLUENZA: Primary | ICD-10-CM

## 2019-09-25 PROCEDURE — 99207 ZZC NO CHARGE NURSE ONLY: CPT | Performed by: PHYSICIAN ASSISTANT

## 2019-09-25 PROCEDURE — 90682 RIV4 VACC RECOMBINANT DNA IM: CPT | Performed by: PHYSICIAN ASSISTANT

## 2019-09-25 PROCEDURE — 90471 IMMUNIZATION ADMIN: CPT | Performed by: PHYSICIAN ASSISTANT

## 2019-10-25 ENCOUNTER — TELEPHONE (OUTPATIENT)
Dept: FAMILY MEDICINE | Facility: CLINIC | Age: 54
End: 2019-10-25

## 2019-10-25 DIAGNOSIS — Z11.4 SCREENING FOR HUMAN IMMUNODEFICIENCY VIRUS WITHOUT PRESENCE OF RISK FACTORS: ICD-10-CM

## 2019-10-25 DIAGNOSIS — I10 BENIGN ESSENTIAL HYPERTENSION: ICD-10-CM

## 2019-10-25 DIAGNOSIS — Z13.220 LIPID SCREENING: Primary | ICD-10-CM

## 2019-10-25 NOTE — TELEPHONE ENCOUNTER
Reason for Call: Request for an order or referral:    Order or referral being requested: Order for Cholesterol labs    Date needed: at your convenience    Lorna is aware Dr Esparza is not in today    Has the patient been seen by the PCP for this problem? NO    Additional comments: Lorna states he Edgard states she is due for this    Phone number Patient can be reached at:  Cell number on file:    Telephone Information:   Mobile [phone]       Best Time:  any    Can we leave a detailed message on this number?  YES    Call taken on 10/25/2019 at 8:49 AM by Rosa Elena Win

## 2019-10-26 NOTE — TELEPHONE ENCOUNTER
She will be do for her BP labs too as of the first of the year so we can do all of them.  She is due for her one time HIV screen if she wants to do that too. All orders were placed.    Electronically signed by:  Galindo Esparza M.D.  10/26/2019

## 2019-10-31 ENCOUNTER — HOSPITAL ENCOUNTER (OUTPATIENT)
Dept: MAMMOGRAPHY | Facility: CLINIC | Age: 54
Discharge: HOME | End: 2019-10-31
Attending: FAMILY MEDICINE | Admitting: FAMILY MEDICINE
Payer: COMMERCIAL

## 2019-10-31 DIAGNOSIS — Z13.220 LIPID SCREENING: ICD-10-CM

## 2019-10-31 DIAGNOSIS — Z12.31 VISIT FOR SCREENING MAMMOGRAM: ICD-10-CM

## 2019-10-31 DIAGNOSIS — I10 BENIGN ESSENTIAL HYPERTENSION: ICD-10-CM

## 2019-10-31 DIAGNOSIS — Z11.4 SCREENING FOR HUMAN IMMUNODEFICIENCY VIRUS WITHOUT PRESENCE OF RISK FACTORS: ICD-10-CM

## 2019-10-31 LAB
ALBUMIN SERPL-MCNC: 4.2 G/DL (ref 3.4–5)
ALP SERPL-CCNC: 82 U/L (ref 40–150)
ALT SERPL W P-5'-P-CCNC: 64 U/L (ref 0–50)
ANION GAP SERPL CALCULATED.3IONS-SCNC: 7 MMOL/L (ref 3–14)
AST SERPL W P-5'-P-CCNC: 53 U/L (ref 0–45)
BILIRUB SERPL-MCNC: 0.6 MG/DL (ref 0.2–1.3)
BUN SERPL-MCNC: 16 MG/DL (ref 7–30)
CALCIUM SERPL-MCNC: 9.2 MG/DL (ref 8.5–10.1)
CHLORIDE SERPL-SCNC: 102 MMOL/L (ref 94–109)
CHOLEST SERPL-MCNC: 320 MG/DL
CO2 SERPL-SCNC: 29 MMOL/L (ref 20–32)
CREAT SERPL-MCNC: 0.77 MG/DL (ref 0.52–1.04)
CREAT UR-MCNC: 69 MG/DL
GFR SERPL CREATININE-BSD FRML MDRD: 87 ML/MIN/{1.73_M2}
GLUCOSE SERPL-MCNC: 111 MG/DL (ref 70–99)
HDLC SERPL-MCNC: 200 MG/DL
HIV 1+2 AB+HIV1 P24 AG SERPL QL IA: NONREACTIVE
LDLC SERPL CALC-MCNC: 108 MG/DL
MICROALBUMIN UR-MCNC: 7 MG/L
MICROALBUMIN/CREAT UR: 9.74 MG/G CR (ref 0–25)
NONHDLC SERPL-MCNC: 120 MG/DL
POTASSIUM SERPL-SCNC: 3.9 MMOL/L (ref 3.4–5.3)
PROT SERPL-MCNC: 7.8 G/DL (ref 6.8–8.8)
SODIUM SERPL-SCNC: 138 MMOL/L (ref 133–144)
TRIGL SERPL-MCNC: 62 MG/DL

## 2019-10-31 PROCEDURE — 80061 LIPID PANEL: CPT | Performed by: FAMILY MEDICINE

## 2019-10-31 PROCEDURE — 77067 SCR MAMMO BI INCL CAD: CPT

## 2019-10-31 PROCEDURE — 87389 HIV-1 AG W/HIV-1&-2 AB AG IA: CPT | Performed by: FAMILY MEDICINE

## 2019-10-31 PROCEDURE — 36415 COLL VENOUS BLD VENIPUNCTURE: CPT | Performed by: FAMILY MEDICINE

## 2019-10-31 PROCEDURE — 80053 COMPREHEN METABOLIC PANEL: CPT | Performed by: FAMILY MEDICINE

## 2019-10-31 PROCEDURE — 82043 UR ALBUMIN QUANTITATIVE: CPT | Performed by: FAMILY MEDICINE

## 2019-11-01 ENCOUNTER — MYC MEDICAL ADVICE (OUTPATIENT)
Dept: FAMILY MEDICINE | Facility: CLINIC | Age: 54
End: 2019-11-01

## 2019-11-01 NOTE — TELEPHONE ENCOUNTER
Pt calling in regards to these results. Can a covering provider address this for her today?   Thank you,  Suma Langley- Patient Representative

## 2019-11-01 NOTE — TELEPHONE ENCOUNTER
Please let patient know that her urine albumin level was normal.  Kidney function test and electrolytes were normal.  Her liver function test was slightly elevated.  Encouraged to avoid alcohol products as well excessive Tylenol intake.  Recommend to follow-up with the ordering physician for further evaluation.  He requested.  He we reviewed the lab results again on Monday when his back.  HIV screening test was negative.  Total cholesterol is quite high but her bad cholesterol looks good.  Her good cholesterol is really high which is good.  Blood sugar slightly elevated, unlikely that she has diabetes.  Thank you

## 2019-11-05 ENCOUNTER — MYC MEDICAL ADVICE (OUTPATIENT)
Dept: FAMILY MEDICINE | Facility: CLINIC | Age: 54
End: 2019-11-05

## 2019-11-05 DIAGNOSIS — E78.2 MIXED HYPERLIPIDEMIA: Primary | ICD-10-CM

## 2019-11-05 NOTE — LETTER
[address]   [address]  Tel. [phone]/ Fax [phone]    January 25, 2020        Lorna Gorman  [address]  [facility] [address]          Dear MsMis Lorna GOLD Sherif:    Your previous orders for lab work have been extended.  Please call to schedule a lab appointment and return to the clinic to have the labs drawn at your earliest convenience.    If you are required to be fasting for these tests,  remember to have nothing by mouth (except water) after midnight on the night before your test.    If you have any questions or concerns, please contact our office.    Sincerely,       Galindo Esparza MD

## 2019-11-06 RX ORDER — ATORVASTATIN CALCIUM 10 MG/1
10 TABLET, FILM COATED ORAL DAILY
Qty: 90 TABLET | Refills: 3 | Status: SHIPPED | OUTPATIENT
Start: 2019-11-06 | End: 2020-10-30

## 2019-11-26 ENCOUNTER — OFFICE VISIT (OUTPATIENT)
Dept: FAMILY MEDICINE | Facility: OTHER | Age: 54
End: 2019-11-26
Payer: COMMERCIAL

## 2019-11-26 VITALS
BODY MASS INDEX: 19.46 KG/M2 | TEMPERATURE: 98 F | RESPIRATION RATE: 14 BRPM | DIASTOLIC BLOOD PRESSURE: 88 MMHG | HEART RATE: 86 BPM | HEIGHT: 67 IN | WEIGHT: 124 LBS | OXYGEN SATURATION: 96 % | SYSTOLIC BLOOD PRESSURE: 126 MMHG

## 2019-11-26 DIAGNOSIS — L98.9 SKIN LESION: Primary | ICD-10-CM

## 2019-11-26 PROCEDURE — 87070 CULTURE OTHR SPECIMN AEROBIC: CPT | Performed by: PHYSICIAN ASSISTANT

## 2019-11-26 PROCEDURE — 99213 OFFICE O/P EST LOW 20 MIN: CPT | Performed by: PHYSICIAN ASSISTANT

## 2019-11-26 PROCEDURE — 87077 CULTURE AEROBIC IDENTIFY: CPT | Performed by: PHYSICIAN ASSISTANT

## 2019-11-26 RX ORDER — CEPHALEXIN 500 MG/1
500 CAPSULE ORAL 3 TIMES DAILY
Qty: 30 CAPSULE | Refills: 0 | Status: SHIPPED | OUTPATIENT
Start: 2019-11-26 | End: 2019-12-06

## 2019-11-26 ASSESSMENT — ANXIETY QUESTIONNAIRES
2. NOT BEING ABLE TO STOP OR CONTROL WORRYING: SEVERAL DAYS
3. WORRYING TOO MUCH ABOUT DIFFERENT THINGS: SEVERAL DAYS
6. BECOMING EASILY ANNOYED OR IRRITABLE: NOT AT ALL
GAD7 TOTAL SCORE: 3
4. TROUBLE RELAXING: NOT AT ALL
1. FEELING NERVOUS, ANXIOUS, OR ON EDGE: SEVERAL DAYS
GAD7 TOTAL SCORE: 3
7. FEELING AFRAID AS IF SOMETHING AWFUL MIGHT HAPPEN: NOT AT ALL
7. FEELING AFRAID AS IF SOMETHING AWFUL MIGHT HAPPEN: NOT AT ALL
5. BEING SO RESTLESS THAT IT IS HARD TO SIT STILL: NOT AT ALL
GAD7 TOTAL SCORE: 3

## 2019-11-26 ASSESSMENT — MIFFLIN-ST. JEOR: SCORE: 1182.7

## 2019-11-26 ASSESSMENT — PATIENT HEALTH QUESTIONNAIRE - PHQ9
10. IF YOU CHECKED OFF ANY PROBLEMS, HOW DIFFICULT HAVE THESE PROBLEMS MADE IT FOR YOU TO DO YOUR WORK, TAKE CARE OF THINGS AT HOME, OR GET ALONG WITH OTHER PEOPLE: NOT DIFFICULT AT ALL
SUM OF ALL RESPONSES TO PHQ QUESTIONS 1-9: 0
SUM OF ALL RESPONSES TO PHQ QUESTIONS 1-9: 0

## 2019-11-26 NOTE — PROGRESS NOTES
Subjective     Lorna Gorman is a 54 year old female who presents to clinic today for the following health issues:    History of Present Illness        She eats 2-3 servings of fruits and vegetables daily.She consumes 0 sweetened beverage(s) daily.  She is taking medications regularly.     Rash  Onset: a couple of days     Description:   Location: on her left hand thumb and she has one on her right shoulder and back   Character: round, raised, painful, burning, red, white head   Itching (Pruritis): no     Progression of Symptoms:  Worsening, spreading.     Accompanying Signs & Symptoms:  Fever: no   Body aches or joint pain: YES- aches and chills last night   Sore throat symptoms: YES- glands are swollen.   Recent cold symptoms: no     History:   Previous similar rash: YES- she thinks so years ago but unsure.     Precipitating factors:   Exposure to similar rash: no   New exposures: medication just started Lipitor   Recent travel: no     Alleviating factors:  none    Therapies Tried and outcome: tea tree oil, another cream that she used to have     The first spot that developed was on the left hand.  It was a pustule, popped and now scabbed over.  The other one next to it started to develop then the one on her chest and then upper right back.  They aren't itchy, just painful.  No fevers, has had chills but not sure if that could be hot flashes related to menopause symptoms.  No new topicals or exposures. No known bug bites.  No recent travel. No joint pains or joint swelling.  No bowel or bladder concerns.  No URI symptoms.     Answers for HPI/ROS submitted by the patient on 11/26/2019   Chronic problems general questions HPI Form  If you checked off any problems, how difficult have these problems made it for you to do your work, take care of things at home, or get along with other people?: Not difficult at all  PHQ9 TOTAL SCORE: 0  TITA 7 TOTAL SCORE: 3      Current Outpatient Medications   Medication Sig Dispense  "Refill     ASPIRIN PO        atorvastatin (LIPITOR) 10 MG tablet Take 1 tablet (10 mg) by mouth daily 90 tablet 3     cephALEXin (KEFLEX) 500 MG capsule Take 1 capsule (500 mg) by mouth 3 times daily for 10 days 30 capsule 0     losartan (COZAAR) 50 MG tablet Take 1 tablet (50 mg) by mouth daily 90 tablet 3     aluminum chloride (DRYSOL) 20 % external solution Apply topically At Bedtime 60 mL 11     Allergies   Allergen Reactions     No Known Allergies      Ragweeds        Reviewed and updated as needed this visit by Provider  Tobacco  Allergies  Meds  Problems  Med Hx  Surg Hx  Fam Hx         Review of Systems   ROS COMP: Constitutional, HEENT, cardiovascular, pulmonary, gi and gu, skin, msk, neuro systems are negative, except as otherwise noted.      Objective    /88   Pulse 86   Temp 98  F (36.7  C) (Temporal)   Resp 14   Ht 1.69 m (5' 6.54\")   Wt 56.2 kg (124 lb)   SpO2 96%   BMI 19.69 kg/m    Body mass index is 19.69 kg/m .  Physical Exam   GENERAL: healthy, alert and no distress  MS: no gross musculoskeletal defects noted, no edema  SKIN: LEFT THUMB - there are 2 circular dark purple lesions one with central pustule and one with central scab, there are two small pin point flesh colored papules proximal to this.  RIGHT CHEST - there is a 3 mm pustule with mild surrounding erythema.  RIGHT UPPER BACK -  3 mm scabbed lesion with mild surrounding erythema.   NEURO: Normal strength and tone, mentation intact and speech normal  PSYCH: mentation appears normal, affect normal/bright    Diagnostic Test Results:  Labs reviewed in Epic        Assessment & Plan       ICD-10-CM    1. Skin lesion L98.9 Wound Culture Aerobic Bacterial     cephALEXin (KEFLEX) 500 MG capsule       No associated symptoms or exposures.  Question staph infection.  Obtained wound culture and started her on Cephalexin.  Advised to take probiotics, monitor for side effects and can take with food.  Ok to use topical antibiotic " ointment.  If open or draining recommend keeping clean, dry and covered.  If spreading or new symptoms develop recommend follow-up in clinic.     Return in about 1 week (around 12/3/2019) for If not improving, sooner if worse or new concerns, pending culture.    Options for treatment and follow-up care were reviewed with the patient and/or guardian. Patient and/or guardian engaged in the decision making process and verbalized understanding of the options discussed and agreed with the final plan.     Sujit Barrera PA-C  [facility]

## 2019-11-27 ENCOUNTER — MYC MEDICAL ADVICE (OUTPATIENT)
Dept: FAMILY MEDICINE | Facility: OTHER | Age: 54
End: 2019-11-27

## 2019-11-27 ENCOUNTER — TELEPHONE (OUTPATIENT)
Dept: FAMILY MEDICINE | Facility: OTHER | Age: 54
End: 2019-11-27

## 2019-11-27 DIAGNOSIS — L98.9 SKIN LESION: Primary | ICD-10-CM

## 2019-11-27 RX ORDER — CLINDAMYCIN HCL 300 MG
300 CAPSULE ORAL 3 TIMES DAILY
Qty: 21 CAPSULE | Refills: 0 | Status: SHIPPED | OUTPATIENT
Start: 2019-11-27 | End: 2019-12-06

## 2019-11-27 ASSESSMENT — ANXIETY QUESTIONNAIRES: GAD7 TOTAL SCORE: 3

## 2019-11-27 ASSESSMENT — PATIENT HEALTH QUESTIONNAIRE - PHQ9: SUM OF ALL RESPONSES TO PHQ QUESTIONS 1-9: 0

## 2019-11-27 NOTE — TELEPHONE ENCOUNTER
Reason for Call:  Other call back and prescription    Detailed comments: Patient called clinic. She is asking for a call back from a nurse. She has questions about the medication, KEFLEX, that she is taking. Please call patient back.     Phone Number Patient can be reached at: Home number on file [phone] (home)    Best Time: any    Can we leave a detailed message on this number? YES    Call taken on 11/27/2019 at 8:58 AM by Keegan Sanchez

## 2019-11-27 NOTE — TELEPHONE ENCOUNTER
Reason for Call:  Other results    Detailed comments: patient is wondering if her lab results will be updated on my chart soon. She states she was called with them but wanted to read them.    Phone Number Patient can be reached at: Home number on file [phone] (home)    Best Time: any    Can we leave a detailed message on this number? YES    Call taken on 11/27/2019 at 3:52 PM by Shira Coello

## 2019-11-27 NOTE — TELEPHONE ENCOUNTER
She can try taking it through the day but if it is spreading recommend switching. Sent in Clindamycin to the pharmacy.  Make sure to take with probiotics.  If having diarrhea with medication should follow-up in clinic.  Go to UC over Holiday or on weekend if symptoms are progressively getting worse or new concerns.     Sujit Barrera PA-C

## 2019-11-27 NOTE — TELEPHONE ENCOUNTER
She has a question about the Keflex, today she found a couple more dots. She is wondering how long the Keflex kicks in. She found one on her calf and a couple little ones on her hands, she called the pharmacist. She hasn't been on this for 24 hours yet. Took 1 at noon, one at 6 and one at midnight, also took it this morning maybe around 8ish. She's worried and has been googling it.   Deborah Sandy MA

## 2019-11-28 LAB
BACTERIA SPEC CULT: ABNORMAL
Lab: ABNORMAL
SPECIMEN SOURCE: ABNORMAL

## 2019-11-29 NOTE — TELEPHONE ENCOUNTER
Called patient and she is worried that the antibiotic is not going to be strong enough for what the wound culture grew out.     Talked with  and he said this antibiotic Clindamycin should be good and to give it time. If not better in a few days to call back.    Patient was notified  MP/MA

## 2019-11-29 NOTE — TELEPHONE ENCOUNTER
Pt calling and asking if Dr Esparza is in clinic today, was advised he is not. Pt asking if someone from his team can call her today to discuss her lab results from 11.26 appt. Pt states she is worried that the PA she saw was not sure of what the blisters were and has concerns about antibiotic given. Please advise.

## 2019-12-02 ENCOUNTER — TELEPHONE (OUTPATIENT)
Dept: FAMILY MEDICINE | Facility: CLINIC | Age: 54
End: 2019-12-02

## 2019-12-02 NOTE — TELEPHONE ENCOUNTER
Returned patient's call, she has questions and wants to report a sore throat.  - reports she has 1 more day of antibiotic that she is taking to treat a skin infection  -  Taking clindamycin 300mg TID x 7 days  - wounds/rash is improving since starting 2nd antibiotic, larger 'spots' are drying up, covered by dry scab    Onset: ~around 4-5 days swollen glands under her ears on her neck and dull headache and fatigue  - denies fever, difficulty swallowing or breathing  - no runny nose or cough  - throat is somewhat sore  - wonders if this is just side effects from her antibiotic or the infection she has, or if it is something new that she should be seen in clinic for  - she wants to ask ES for recs before coming back to the clinic for eval    Will call pt back with ES' recs.    Routed to ES.  Emelia Win RN, BSN

## 2019-12-02 NOTE — TELEPHONE ENCOUNTER
Called patient, no new spots have started with her rash since she was seen.  - she has 1 more day of antibiotic and is very grateful it is almost resolved  - denies being very concerned about her swollen gland issue at this time  - right now, they glands are not swollen. Sometimes they are swollen, then the swelling goes away, only to reappear again the next day  - provided home care ideas for sore throat  - she will call the clinic if this does not resolve and she decides to be seen    She states she has no other questions or concerns at this time.    Routed FYI to .  Closed encounter.  Emelia Win, RN, BSN

## 2019-12-02 NOTE — TELEPHONE ENCOUNTER
Not likely from the antibiotic.  No new spots?  Can try treating symptomatically for her new symptoms but if not improving or worse would recommend evaluation in clinic.     Sujit Barrera PA-C

## 2019-12-02 NOTE — TELEPHONE ENCOUNTER
Reason for call:  Patient reporting a symptom    Symptom or request: swollen glands     Duration (how long have symptoms been present):     Have you been treated for this before? Yes    Additional comments: patient was seen by ES on 11-26 and still having swollen glands and feeling of strep throat. Is wondering if she should be seen again    Phone Number patient can be reached at:  Home number on file [phone] (home)    Best Time:  any    Can we leave a detailed message on this number:  YES    Call taken on 12/2/2019 at 7:31 AM by Shira Coello

## 2019-12-06 ENCOUNTER — OFFICE VISIT (OUTPATIENT)
Dept: FAMILY MEDICINE | Facility: OTHER | Age: 54
End: 2019-12-06
Payer: COMMERCIAL

## 2019-12-06 VITALS
TEMPERATURE: 98.4 F | BODY MASS INDEX: 19.69 KG/M2 | DIASTOLIC BLOOD PRESSURE: 90 MMHG | SYSTOLIC BLOOD PRESSURE: 148 MMHG | OXYGEN SATURATION: 98 % | RESPIRATION RATE: 16 BRPM | HEART RATE: 108 BPM | WEIGHT: 124 LBS

## 2019-12-06 DIAGNOSIS — A49.01 INFECTION DUE TO STAPHYLOCOCCUS AUREUS: Primary | ICD-10-CM

## 2019-12-06 DIAGNOSIS — L98.9 SKIN LESION: ICD-10-CM

## 2019-12-06 PROCEDURE — 99214 OFFICE O/P EST MOD 30 MIN: CPT | Performed by: NURSE PRACTITIONER

## 2019-12-06 RX ORDER — CLINDAMYCIN HCL 300 MG
300 CAPSULE ORAL 3 TIMES DAILY
Qty: 30 CAPSULE | Refills: 0 | Status: SHIPPED | OUTPATIENT
Start: 2019-12-06 | End: 2019-12-18

## 2019-12-06 ASSESSMENT — PAIN SCALES - GENERAL: PAINLEVEL: NO PAIN (0)

## 2019-12-06 NOTE — PROGRESS NOTES
Subjective     Lorna Gorman is a 54 year old female who presents to clinic today for the following health issues:    HPI   Concern -   Ongoing breakout / staph  ( strep A)  On skin  Onset:  2weeks    Description:    sore on left hand - had one on chest back and leg  And now groin    Intensity: moderate    Progression of Symptoms:  Worsening again    Accompanying Signs & Symptoms:  Red and blotchy     Previous history of similar problem:   No    Precipitating factors:   Worsened by: NA  - not sure     Alleviating factors:  Improved by:   The antibiotic helped initially      Saw ES 11/26   First spot on left hand. Then second area developed on her chest and upper right back. Started on Keflex changed to Clindamycin     Here today developing new area on her groin. She notes this is similar.   Lesion on left inner thigh, pustule with surround skin erythema and scattered papules throughout the pelvic and groin area, non-pustule.       Current Outpatient Medications   Medication Sig Dispense Refill     ASPIRIN PO        atorvastatin (LIPITOR) 10 MG tablet Take 1 tablet (10 mg) by mouth daily 90 tablet 3     losartan (COZAAR) 50 MG tablet Take 1 tablet (50 mg) by mouth daily 90 tablet 3     fluconazole (DIFLUCAN) 150 MG tablet Take 1 tablet (150 mg) by mouth every 3 days For 3 doses. 3 tablet 0     hydrOXYzine (ATARAX) 25 MG tablet Take 1 tablet (25 mg) by mouth nightly as needed for itching 30 tablet 0     triamcinolone (KENALOG) 0.1 % external cream Apply topically 2 times daily For 10 days. No longer than 10 days. 30 g 0       Reviewed and updated as needed this visit by Provider  Allergies         Review of Systems   Constitutional: Negative.             Objective    BP (!) 148/90   Pulse 108   Temp 98.4  F (36.9  C)   Resp 16   Wt 56.2 kg (124 lb)   SpO2 98%   BMI 19.69 kg/m    Body mass index is 19.69 kg/m .  Physical Exam  Skin:     Comments: Bilateral groin and jaki area with several papules noted with  small amount of erythema noted. Left inner thigh lesion- pustule noted, this is new. Hand- Left with papule noted with surrounding erythema.    Neurological:      Mental Status: She is alert and oriented to person, place, and time.   Psychiatric:         Behavior: Behavior is cooperative.            Diagnostic Test Results:  none         Assessment & Plan       ICD-10-CM    1. Infection due to Staphylococcus aureus A49.01 DISCONTINUED: clindamycin (CLEOCIN) 300 MG capsule   2. Skin lesion L98.9         I discussed these findings with patient. Clindamycin should have good coverage for her infection. I also discussed this with ES. At this time I was considering switching her to a penicillin vs extending the Clindamycin. I recommend since she has only done 7 days of the Clindamycin that we extend this medication for an additional 10 days since she did have 2 days without antibiotics. I also advised her to keep the areas clean. Continue to avoid any itching. IF any concerns for a yeast infection to let me know. She did do a monistat. Continue with probiotic and yogurt.     Recommend follow up if symptoms worsen or do not improve. If no improvements we will need to consider a consult with ID.     The patient indicates understanding of these issues and agrees with the plan.    Patient Instructions   - Start Clindamycin three times daily for 10 days  - Recommend monitoring symptoms, if any fevers, lightheadedness, dizziness, worsening redness/swelling, please go in to be evaluated.   - Return to clinic if symptoms do not resolve.       MAKEDA Chapin CNP  Questions or concerns please feel free to send me a [employer] message or call me  Phone : [phone]          Return in about 3 days (around 12/9/2019) for If not improved.    MAKEDA Chapin CNP  [facility]

## 2019-12-06 NOTE — PATIENT INSTRUCTIONS
- Start Clindamycin three times daily for 10 days  - Recommend monitoring symptoms, if any fevers, lightheadedness, dizziness, worsening redness/swelling, please go in to be evaluated.   - Return to clinic if symptoms do not resolve.       MAKEDA Chapin CNP  Questions or concerns please feel free to send me a [employer] message or call me  Phone : [phone]

## 2019-12-08 ENCOUNTER — HEALTH MAINTENANCE LETTER (OUTPATIENT)
Age: 54
End: 2019-12-08

## 2019-12-09 ENCOUNTER — MYC MEDICAL ADVICE (OUTPATIENT)
Dept: FAMILY MEDICINE | Facility: CLINIC | Age: 54
End: 2019-12-09

## 2019-12-10 ENCOUNTER — TELEPHONE (OUTPATIENT)
Dept: FAMILY MEDICINE | Facility: OTHER | Age: 54
End: 2019-12-10

## 2019-12-10 DIAGNOSIS — B37.31 CANDIDIASIS OF VAGINA: Primary | ICD-10-CM

## 2019-12-10 RX ORDER — FLUCONAZOLE 150 MG/1
150 TABLET ORAL ONCE
Qty: 1 TABLET | Refills: 0 | Status: SHIPPED | OUTPATIENT
Start: 2019-12-10 | End: 2019-12-18

## 2019-12-10 NOTE — TELEPHONE ENCOUNTER
Reason for Call:  Medication or medication refill:    Do you use a [facility]?  Name of the pharmacy and phone number for the current request:  [facility] - [phone]    Name of the medication requested: the tablet form please, for a yeast infection    Other request: patient was seen by derik mckeon on 12/04/2019 and KV told her that if she gets a yeast infection from the antibiotic she could call and she would send something in    Can we leave a detailed message on this number? YES    Phone number patient can be reached at: Cell number on file:    Telephone Information:   Mobile [phone]       Best Time: any    Call taken on 12/10/2019 at 1:11 PM by Leonora Lewis

## 2019-12-10 NOTE — TELEPHONE ENCOUNTER
Returned patient's call to discuss symptoms/verfiy location.  - has been on antibiotics for staph infection, feels sx are improving  - also has vaginal symptoms: itch, thick, white discharge, not odorous  - states she was having these symptoms very mildly when seen 12/4/19 by KV  - reports KV mentioned if she noticed symptoms of yeast infection became worse, to let her know if she needed pill treatment for it sent to her pharmacy  - symptoms are worse now, so she would like to try this pill (Diflucan) - [facility]  - she doesn't think she has taken this in the past    Agreed to send update/request to  and send her an update via [employer]  She agrees with this plan and has no other needs at this time.    Routed to .  Emelia Win, RN, BSN

## 2019-12-11 NOTE — TELEPHONE ENCOUNTER
Left message for pt to return call, when call is returned give information below.    Yuki Dempsey CMA ([facility])

## 2019-12-11 NOTE — TELEPHONE ENCOUNTER
I apologize for the delay, the medication was sent to her pharmacy, if no improvements let me know. Continue with the probiotic and yogurt as well.       MAKEDA Chapin CNP  Questions or concerns please feel free to send me a [employer] message or call me  Phone : [phone]

## 2019-12-16 DIAGNOSIS — B37.31 CANDIDIASIS OF VAGINA: ICD-10-CM

## 2019-12-16 RX ORDER — FLUCONAZOLE 150 MG/1
TABLET ORAL
Qty: 1 TABLET | Refills: 0 | OUTPATIENT
Start: 2019-12-16

## 2019-12-17 ENCOUNTER — TELEPHONE (OUTPATIENT)
Dept: FAMILY MEDICINE | Facility: OTHER | Age: 54
End: 2019-12-17

## 2019-12-17 NOTE — PROGRESS NOTES
Subjective     Lorna Gorman is a 54 year old female who presents to clinic today for the following health issues:    HPI   Rash  Onset:   Ongoing..    Description:   Location: upper abdomen - new spots  Character: burning, draining  Itching (Pruritis): YES    Progression of Symptoms:  same and intermittent    Accompanying Signs & Symptoms:  Fever: no   Body aches or joint pain: no   Sore throat symptoms: no   Recent cold symptoms: no     History:   Previous similar rash: YES    Precipitating factors:   Exposure to similar rash: no   New exposures: None   Recent travel: no     Alleviating factors:        Therapies Tried and outcome:    Cleocin    puritic rash along abdomen. Light pink urticaria  appearing.   Current Outpatient Medications   Medication Sig Dispense Refill     ASPIRIN PO        atorvastatin (LIPITOR) 10 MG tablet Take 1 tablet (10 mg) by mouth daily 90 tablet 3     fluconazole (DIFLUCAN) 150 MG tablet Take 1 tablet (150 mg) by mouth every 3 days For 3 doses. 3 tablet 0     hydrOXYzine (ATARAX) 25 MG tablet Take 1 tablet (25 mg) by mouth nightly as needed for itching 30 tablet 0     losartan (COZAAR) 50 MG tablet Take 1 tablet (50 mg) by mouth daily 90 tablet 3     triamcinolone (KENALOG) 0.1 % external cream Apply topically 2 times daily For 10 days. No longer than 10 days. 30 g 0     BP Readings from Last 3 Encounters:   12/18/19 134/84   12/06/19 (!) 148/90   11/26/19 126/88    Wt Readings from Last 3 Encounters:   12/18/19 56.2 kg (124 lb)   12/06/19 56.2 kg (124 lb)   11/26/19 56.2 kg (124 lb)                    Reviewed and updated as needed this visit by Provider         Review of Systems         Objective    /84   Pulse 83   Temp 97.9  F (36.6  C)   Resp 16   Wt 56.2 kg (124 lb)   SpO2 100%   BMI 19.69 kg/m    Body mass index is 19.69 kg/m .  Physical Exam  Skin:     General: Skin is warm.      Comments: Left inner thigh- scared over lesion, does not appear to be open or infected.      Abdomen/Torso/upper back- scattered non-raised erythematous lesions. Consistent with a hive appearing rash.    Neurological:      Mental Status: She is alert.            Diagnostic Test Results:  none         Assessment & Plan       ICD-10-CM    1. Rash R21 hydrOXYzine (ATARAX) 25 MG tablet     triamcinolone (KENALOG) 0.1 % external cream   2. Itching L29.9 hydrOXYzine (ATARAX) 25 MG tablet   3. Infection due to Staphylococcus aureus A49.01    4. Yeast infection of the vagina B37.3 fluconazole (DIFLUCAN) 150 MG tablet     Patient here today with concerns for a rash.  Has history of recent infection due to Staphylococcus aureus.  Which has been treated for with clindamycin for the past month and a half.  Today she presents with resolution of her staph infection.  Lesion along the left inner groin is now healing.  Appears to be scarring.  Patient notes that she notes developed in the last couple of days a hive appearing rash along the abdomen and upper back.  She notes that this itches.  We discussed that her symptoms are likely from her sweating/hot flashes.  Could also be a skin response from the antibiotic.  Hard to decipher which.  At this time I recommend she try a topical steroid along the abdomen and upper back.  Applying a thin layer twice daily for 10 days.  In addition I recommend daily Zyrtec.  I also recommend Atarax as needed at night to help with prevention of itching.  In addition to this    Patient notes that with the long course of antibiotics that she had she has continued vaginal itching and discharge.  At this time I do not feel we need to do a wet prep I can go off of symptoms.  Recommend Diflucan daily every 3 days for 3 doses.  If symptoms do not resolve she will need to return to neck for a wet prep and further evaluation.     If her staff returns I do recommend a possible ID consult versus Derm consult.    Return to clinic if symptoms worsen or do not improve.    The patient indicates  understanding of these issues and agrees with the plan.      Return in about 3 days (around 12/21/2019) for If not improved.    MAKEDA Chapin [facility]

## 2019-12-17 NOTE — TELEPHONE ENCOUNTER
"I spoke with patient per ES request.   The new development of the rash \"kind of looks the same\" but it's on her torso.   She still has some of the old rash that has been treated, \"but I'll know for sure tomorrow\".   She took an allergy pill to see if it would help and would like to follow up with KV tomorrow.   She understands that she may need to see dermatology, but also has a yeast infection that she would like to have evaluated again.     Next 5 appointments (look out 90 days)    Dec 18, 2019  8:20 AM CST  Office Visit with MAKEDA Sanchez CNP  [facility] ([facility]) [address] [place] [id number]  [phone]   Jan 22, 2020  1:20 PM CST  PHYSICAL with Galindo Esparza MD  [facility] ([facility]) [address] [facility] [id number] [phone]        Leilani Jameson, RN, BSN    "

## 2019-12-17 NOTE — TELEPHONE ENCOUNTER
Diflucan  Prescribed for acute issue. Please call patient to clarify request.     Leilani Jameson, RN, BSN

## 2019-12-17 NOTE — TELEPHONE ENCOUNTER
Scheduled patient for visit to discuss possible yeast infection- did relay KV recommends possible Derm consult for rash.     Georgia Simms MA

## 2019-12-17 NOTE — TELEPHONE ENCOUNTER
Patient informed, but set up an appointment for tomorrow to discuss a yeast infection.     Leilani Jameson RN, BSN

## 2019-12-17 NOTE — TELEPHONE ENCOUNTER
Reason for call:  Patient reporting a symptom    Symptom or request: Rash on body    Duration (how long have symptoms been present): a day    Have you been treated for this before? Yes    Additional comments: Pt called and would like to be seen or get another medication. She said she just took her last one today and symptoms have not improved and now she has a rash on her body. Please Advise thank you    Phone Number patient can be reached at:  Home number on file [phone] (home)    Best Time:  anytime    Can we leave a detailed message on this number:  YES    Call taken on 12/17/2019 at 9:16 AM by Ann Gonsalez     VO from Yousuf Cowart NP for PCV13                        Given in Right Deltoid     Pt denies any acute infections at this time or fever. Pt aware of reactions and symptoms. Advised pt to stay post 15 mins to monitor for any serious reactions. No reactions noted at this time.

## 2019-12-18 ENCOUNTER — OFFICE VISIT (OUTPATIENT)
Dept: FAMILY MEDICINE | Facility: OTHER | Age: 54
End: 2019-12-18
Payer: COMMERCIAL

## 2019-12-18 VITALS
BODY MASS INDEX: 19.69 KG/M2 | WEIGHT: 124 LBS | TEMPERATURE: 97.9 F | DIASTOLIC BLOOD PRESSURE: 84 MMHG | OXYGEN SATURATION: 100 % | HEART RATE: 83 BPM | RESPIRATION RATE: 16 BRPM | SYSTOLIC BLOOD PRESSURE: 134 MMHG

## 2019-12-18 DIAGNOSIS — B37.31 YEAST INFECTION OF THE VAGINA: ICD-10-CM

## 2019-12-18 DIAGNOSIS — L29.9 ITCHING: ICD-10-CM

## 2019-12-18 DIAGNOSIS — R21 RASH: Primary | ICD-10-CM

## 2019-12-18 DIAGNOSIS — A49.01 INFECTION DUE TO STAPHYLOCOCCUS AUREUS: ICD-10-CM

## 2019-12-18 PROCEDURE — 99214 OFFICE O/P EST MOD 30 MIN: CPT | Performed by: NURSE PRACTITIONER

## 2019-12-18 RX ORDER — HYDROXYZINE HYDROCHLORIDE 25 MG/1
25 TABLET, FILM COATED ORAL
Qty: 30 TABLET | Refills: 0 | Status: SHIPPED | OUTPATIENT
Start: 2019-12-18 | End: 2021-02-25

## 2019-12-18 RX ORDER — TRIAMCINOLONE ACETONIDE 1 MG/G
CREAM TOPICAL 2 TIMES DAILY
Qty: 30 G | Refills: 0 | Status: SHIPPED | OUTPATIENT
Start: 2019-12-18 | End: 2021-02-25

## 2019-12-18 RX ORDER — AMOXICILLIN 500 MG/1
500 CAPSULE ORAL 2 TIMES DAILY
Qty: 20 CAPSULE | Refills: 0 | Status: CANCELLED | OUTPATIENT
Start: 2019-12-18

## 2019-12-18 RX ORDER — FLUCONAZOLE 150 MG/1
150 TABLET ORAL
Qty: 3 TABLET | Refills: 0 | Status: SHIPPED | OUTPATIENT
Start: 2019-12-18 | End: 2020-01-22

## 2019-12-18 ASSESSMENT — PAIN SCALES - GENERAL: PAINLEVEL: NO PAIN (0)

## 2019-12-18 NOTE — PATIENT INSTRUCTIONS
- Start Diflucan once daily every 3 days for 3 doses  - Recommend starting Zyrtec daily, this is an antihistamine that will help with your rash  - Recommend topical steroid to area of concern, very thin layer for 10 days.   - Recommend Atarax at night to help prevent itching.   - If worsening symptoms or new staph concerns please return to clinic.       MAKEDA Chapin CNP  Questions or concerns please feel free to send me a [employer] message or call me  Phone : [phone]

## 2019-12-23 ASSESSMENT — ENCOUNTER SYMPTOMS: CONSTITUTIONAL NEGATIVE: 1

## 2020-01-19 ASSESSMENT — ENCOUNTER SYMPTOMS
NAUSEA: 0
CHILLS: 0
ARTHRALGIAS: 0
HEARTBURN: 0
HEADACHES: 0
JOINT SWELLING: 0
HEMATURIA: 0
FREQUENCY: 0
DIZZINESS: 0
NERVOUS/ANXIOUS: 0
ABDOMINAL PAIN: 0
HEMATOCHEZIA: 0
MYALGIAS: 0
FEVER: 0
BREAST MASS: 0
EYE PAIN: 0
WEAKNESS: 0
SHORTNESS OF BREATH: 0
DYSURIA: 0
CONSTIPATION: 0
COUGH: 0
SORE THROAT: 0
PARESTHESIAS: 0
DIARRHEA: 0
PALPITATIONS: 0

## 2020-01-22 ENCOUNTER — RESULT FOLLOW UP (OUTPATIENT)
Dept: FAMILY MEDICINE | Facility: CLINIC | Age: 55
End: 2020-01-22

## 2020-01-22 ENCOUNTER — OFFICE VISIT (OUTPATIENT)
Dept: FAMILY MEDICINE | Facility: CLINIC | Age: 55
End: 2020-01-22
Payer: COMMERCIAL

## 2020-01-22 VITALS
TEMPERATURE: 97.5 F | HEIGHT: 67 IN | WEIGHT: 125 LBS | DIASTOLIC BLOOD PRESSURE: 90 MMHG | OXYGEN SATURATION: 95 % | SYSTOLIC BLOOD PRESSURE: 136 MMHG | RESPIRATION RATE: 18 BRPM | BODY MASS INDEX: 19.62 KG/M2 | HEART RATE: 76 BPM

## 2020-01-22 DIAGNOSIS — Z00.00 ROUTINE GENERAL MEDICAL EXAMINATION AT A HEALTH CARE FACILITY: Primary | ICD-10-CM

## 2020-01-22 DIAGNOSIS — B96.89 BV (BACTERIAL VAGINOSIS): ICD-10-CM

## 2020-01-22 DIAGNOSIS — I10 BENIGN ESSENTIAL HYPERTENSION: ICD-10-CM

## 2020-01-22 DIAGNOSIS — N89.8 VAGINAL DISCHARGE: ICD-10-CM

## 2020-01-22 DIAGNOSIS — R87.610 ASCUS WITH POSITIVE HIGH RISK HPV CERVICAL: ICD-10-CM

## 2020-01-22 DIAGNOSIS — N76.0 BV (BACTERIAL VAGINOSIS): ICD-10-CM

## 2020-01-22 DIAGNOSIS — R87.810 ASCUS WITH POSITIVE HIGH RISK HPV CERVICAL: ICD-10-CM

## 2020-01-22 DIAGNOSIS — E78.2 MIXED HYPERLIPIDEMIA: ICD-10-CM

## 2020-01-22 LAB
ALBUMIN SERPL-MCNC: 4.1 G/DL (ref 3.4–5)
ALP SERPL-CCNC: 84 U/L (ref 40–150)
ALT SERPL W P-5'-P-CCNC: 71 U/L (ref 0–50)
ANION GAP SERPL CALCULATED.3IONS-SCNC: 5 MMOL/L (ref 3–14)
AST SERPL W P-5'-P-CCNC: 61 U/L (ref 0–45)
BILIRUB SERPL-MCNC: 0.9 MG/DL (ref 0.2–1.3)
BUN SERPL-MCNC: 13 MG/DL (ref 7–30)
CALCIUM SERPL-MCNC: 9.5 MG/DL (ref 8.5–10.1)
CHLORIDE SERPL-SCNC: 102 MMOL/L (ref 94–109)
CHOLEST SERPL-MCNC: 262 MG/DL
CO2 SERPL-SCNC: 30 MMOL/L (ref 20–32)
CREAT SERPL-MCNC: 0.71 MG/DL (ref 0.52–1.04)
GFR SERPL CREATININE-BSD FRML MDRD: >90 ML/MIN/{1.73_M2}
GLUCOSE SERPL-MCNC: 101 MG/DL (ref 70–99)
HDLC SERPL-MCNC: 209 MG/DL
LDLC SERPL CALC-MCNC: 42 MG/DL
NONHDLC SERPL-MCNC: 53 MG/DL
POTASSIUM SERPL-SCNC: 3.9 MMOL/L (ref 3.4–5.3)
PROT SERPL-MCNC: 7.7 G/DL (ref 6.8–8.8)
SODIUM SERPL-SCNC: 137 MMOL/L (ref 133–144)
SPECIMEN SOURCE: ABNORMAL
TRIGL SERPL-MCNC: 53 MG/DL
WET PREP SPEC: ABNORMAL

## 2020-01-22 PROCEDURE — 99213 OFFICE O/P EST LOW 20 MIN: CPT | Mod: 25 | Performed by: FAMILY MEDICINE

## 2020-01-22 PROCEDURE — 80053 COMPREHEN METABOLIC PANEL: CPT | Performed by: FAMILY MEDICINE

## 2020-01-22 PROCEDURE — G0145 SCR C/V CYTO,THINLAYER,RESCR: HCPCS | Performed by: FAMILY MEDICINE

## 2020-01-22 PROCEDURE — 99396 PREV VISIT EST AGE 40-64: CPT | Performed by: FAMILY MEDICINE

## 2020-01-22 PROCEDURE — G0124 SCREEN C/V THIN LAYER BY MD: HCPCS | Performed by: FAMILY MEDICINE

## 2020-01-22 PROCEDURE — 80061 LIPID PANEL: CPT | Performed by: FAMILY MEDICINE

## 2020-01-22 PROCEDURE — 87624 HPV HI-RISK TYP POOLED RSLT: CPT | Performed by: FAMILY MEDICINE

## 2020-01-22 PROCEDURE — 36415 COLL VENOUS BLD VENIPUNCTURE: CPT | Performed by: FAMILY MEDICINE

## 2020-01-22 PROCEDURE — 87210 SMEAR WET MOUNT SALINE/INK: CPT | Performed by: FAMILY MEDICINE

## 2020-01-22 RX ORDER — LOSARTAN POTASSIUM 100 MG/1
100 TABLET ORAL DAILY
Qty: 90 TABLET | Refills: 3 | Status: SHIPPED | OUTPATIENT
Start: 2020-01-22 | End: 2021-02-01

## 2020-01-22 RX ORDER — METRONIDAZOLE 500 MG/1
500 TABLET ORAL 2 TIMES DAILY
Qty: 14 TABLET | Refills: 0 | Status: SHIPPED | OUTPATIENT
Start: 2020-01-22 | End: 2020-02-13

## 2020-01-22 ASSESSMENT — PAIN SCALES - GENERAL: PAINLEVEL: NO PAIN (0)

## 2020-01-22 ASSESSMENT — ENCOUNTER SYMPTOMS
SORE THROAT: 0
HEADACHES: 0
BREAST MASS: 0
DYSURIA: 0
EYE PAIN: 0
HEMATURIA: 0
CONSTIPATION: 0
MYALGIAS: 0
NERVOUS/ANXIOUS: 0
ABDOMINAL PAIN: 0
HEMATOCHEZIA: 0
PALPITATIONS: 0
CHILLS: 0
WEAKNESS: 0
SHORTNESS OF BREATH: 0
DIARRHEA: 0
COUGH: 0
FEVER: 0
JOINT SWELLING: 0
HEARTBURN: 0
PARESTHESIAS: 0
NAUSEA: 0
DIZZINESS: 0
ARTHRALGIAS: 0
FREQUENCY: 0

## 2020-01-22 ASSESSMENT — MIFFLIN-ST. JEOR: SCORE: 1188.28

## 2020-01-22 NOTE — PROGRESS NOTES
"   SUBJECTIVE:   CC: Lorna Gorman is an 54 year old woman who presents for preventive health visit.     Healthy Habits:     Getting at least 3 servings of Calcium per day:  Yes    Bi-annual eye exam:  Yes    Dental care twice a year:  Yes    Sleep apnea or symptoms of sleep apnea:  None    Diet:  Regular (no restrictions)    Frequency of exercise:  2-3 days/week    Duration of exercise:  30-45 minutes    Taking medications regularly:  Yes    PHQ-2 Total Score: 0    Additional concerns today:  No      PROBLEMS TO ADD ON...  Under a lot of stress with her aunt's recent death and her mom's diagnosis of colon and liver cancer.  She retired from [employer] to take care of her mom and settle her aunt's estate    Today's PHQ-2 Score:   PHQ-2 ( 1999 [employer]) 1/19/2020   Q1: Little interest or pleasure in doing things 0   Q2: Feeling down, depressed or hopeless 0   PHQ-2 Score 0   Q1: Little interest or pleasure in doing things Not at all   Q2: Feeling down, depressed or hopeless Not at all   PHQ-2 Score 0       Abuse: Current or Past(Physical, Sexual or Emotional)- No  Do you feel safe in your environment? Yes    Have you ever done Advance Care Planning? (For example, a Health Directive, POLST, or a discussion with a medical provider or your loved ones about your wishes): No    Social History     Tobacco Use     Smoking status: Current Some Day Smoker     Packs/day: 0.25     Years: 7.00     Pack years: 1.75     Types: Cigarettes     Start date: 1/1/2010     Smokeless tobacco: Never Used     Tobacco comment: 3 cigarettes/day (6/22/17)   Substance Use Topics     Alcohol use: Yes     Alcohol/week: 7.0 standard drinks     Types: 7 Glasses of wine per week     Comment: \"here and there\"     If you drink alcohol do you typically have >3 drinks per day or >7 drinks per week? No    Alcohol Use 1/19/2020   Prescreen: >3 drinks/day or >7 drinks/week? No   Prescreen: >3 drinks/day or >7 drinks/week? -   No flowsheet data found.    Reviewed " "orders with patient.  Reviewed health maintenance and updated orders accordingly - Yes  Lab work is in process  Labs reviewed in EPIC  BP Readings from Last 3 Encounters:   20 (!) 136/90   19 134/84   19 (!) 148/90    Wt Readings from Last 3 Encounters:   20 56.7 kg (125 lb)   19 56.2 kg (124 lb)   19 56.2 kg (124 lb)                  Patient Active Problem List   Diagnosis     Tobacco abuse     CARDIOVASCULAR SCREENING; LDL GOAL LESS THAN 160     TITA (generalized anxiety disorder)     Benign essential hypertension     Hypertension goal BP (blood pressure) < 140/90     Dyshydrosis     Past Surgical History:   Procedure Laterality Date     C ANESTH,NOSE,SINUS SURGERY       C RESEC HEAD OF PHALANX,TOE       COLONOSCOPY N/A 2015    Procedure: COLONOSCOPY;  Surgeon: Ritesh Chiu MD;  Location:  GI      HYSTEROSCOPY, SURGICAL; W/ ENDOMETRIAL ABLATION, ANY METHOD  09       Social History     Tobacco Use     Smoking status: Current Some Day Smoker     Packs/day: 0.25     Years: 7.00     Pack years: 1.75     Types: Cigarettes     Start date: 2010     Smokeless tobacco: Never Used     Tobacco comment: 3 cigarettes/day (17)   Substance Use Topics     Alcohol use: Yes     Alcohol/week: 7.0 standard drinks     Types: 7 Glasses of wine per week     Comment: \"here and there\"     Family History   Problem Relation Age of Onset     Myocardial Infarction Father 40         at age 40     Hypertension Father      Lipids Father      C.A.D. Father          at age 40 from an MI     Arthritis Mother      Eye Disorder Mother         glaucoma     Genitourinary Problems Mother         hysterectomy     Hypertension Mother      Colon Cancer Mother 80     Liver Cancer Mother 80     Cardiovascular Maternal Grandmother      Eye Disorder Maternal Grandmother         cataracts     Heart Disease Maternal Grandmother      Hypertension Maternal Grandmother      Cardiovascular " Maternal Grandfather      Hypertension Maternal Grandfather      Cardiovascular Paternal Grandmother      Hypertension Paternal Grandmother      Cardiovascular Paternal Grandfather      Hypertension Paternal Grandfather      Musculoskeletal Disorder Paternal Uncle         MS     Heart Failure Maternal Aunt 85         Current Outpatient Medications   Medication Sig Dispense Refill     ASPIRIN PO        atorvastatin (LIPITOR) 10 MG tablet Take 1 tablet (10 mg) by mouth daily 90 tablet 3     hydrOXYzine (ATARAX) 25 MG tablet Take 1 tablet (25 mg) by mouth nightly as needed for itching 30 tablet 0     losartan (COZAAR) 100 MG tablet Take 1 tablet (100 mg) by mouth daily 90 tablet 3     triamcinolone (KENALOG) 0.1 % external cream Apply topically 2 times daily For 10 days. No longer than 10 days. 30 g 0     Allergies   Allergen Reactions     No Known Allergies      Ragweeds      Recent Labs   Lab Test 10/31/19  0823 01/30/19  1535  12/17/16  1307 09/30/14  1505 11/06/13  1132 10/23/12  1203   *  --   --   --  89 98 112     --   --   --  168 132* 154*   TRIG 62  --   --   --  62 53 80   ALT 64*  --   --  22  --   --   --    CR 0.77 0.84   < > 0.76  --   --  0.76   GFRESTIMATED 87 79   < > 80  --   --  81   GFRESTBLACK >90 >90   < > >90  African American GFR Calc    --   --  >90   POTASSIUM 3.9 4.1   < > 3.3*  --   --  3.9   TSH  --   --   --   --   --   --  1.47    < > = values in this interval not displayed.        Mammogram Screening: Patient over age 50, mutual decision to screen reflected in health maintenance.    Pertinent mammograms are reviewed under the imaging tab.  History of abnormal Pap smear: NO - age 30-65 PAP every 5 years with negative HPV co-testing recommended  PAP / HPV Latest Ref Rng & Units 12/9/2015 10/23/2012 8/19/2009   PAP - NIL NIL NIL   HPV 16 DNA NEG Negative - -   HPV 18 DNA NEG Negative - -   OTHER HR HPV NEG Negative - -     Reviewed and updated as needed this visit by clinical  staff  Tobacco  Allergies  Meds         Reviewed and updated as needed this visit by Provider        Past Medical History:   Diagnosis Date     Benign essential hypertension 2017     GENERALIZED ANXIETY DIS 5/15/2007     Hyperlipidemia with target LDL less than 130 2013     Hypertension goal BP (blood pressure) < 140/90 2017     Major depressive disorder, recurrent episode, mild (H) 2015     Migraine, unspecified, without mention of intractable migraine without mention of status migrainosus      Moderate Depression [296.32] 2009     Premenstrual tension syndromes     PMDS on Sarafem and doing very well.     Tobacco abuse 2013      Past Surgical History:   Procedure Laterality Date     C ANESTH,NOSE,SINUS SURGERY       C RESEC HEAD OF PHALANX,TOE       COLONOSCOPY N/A 2015    Procedure: COLONOSCOPY;  Surgeon: Ritesh Chiu MD;  Location:  GI     HC HYSTEROSCOPY, SURGICAL; W/ ENDOMETRIAL ABLATION, ANY METHOD  09     OB History    Para Term  AB Living   6 3 3 0 0 3   SAB TAB Ectopic Multiple Live Births   0 0 0 0 3      # Outcome Date GA Lbr Arcadio/2nd Weight Sex Delivery Anes PTL Lv   6  92 40w0d  2.863 kg (6 lb 5 oz) F    OLE      Name: Suma   5  88 40w0d 10:00 3.317 kg (7 lb 5 oz) F    OLE      Name: Vanessa   4  03/15/86 40w0d 04:00 3.6 kg (7 lb 15 oz) M    OLE      Name: Brennen   3 Term            2 Term            1 Term                Review of Systems   Constitutional: Negative for chills and fever.   HENT: Negative for congestion, ear pain, hearing loss and sore throat.    Eyes: Negative for pain and visual disturbance.   Respiratory: Negative for cough and shortness of breath.    Cardiovascular: Negative for chest pain, palpitations and peripheral edema.   Gastrointestinal: Negative for abdominal pain, constipation, diarrhea, heartburn, hematochezia and nausea.   Breasts:  Negative for tenderness,  "breast mass and discharge.   Genitourinary: Positive for vaginal discharge. Negative for dysuria, frequency, genital sores, hematuria, pelvic pain, urgency and vaginal bleeding.   Musculoskeletal: Negative for arthralgias, joint swelling and myalgias.   Skin: Negative for rash.   Neurological: Negative for dizziness, weakness, headaches and paresthesias.   Psychiatric/Behavioral: Negative for mood changes. The patient is not nervous/anxious.      CONSTITUTIONAL: NEGATIVE for fever, chills, change in weight  INTEGUMENTARY/SKIN: has had a few recurrences of group A strep skin lesions.    EYES: NEGATIVE for vision changes or irritation  ENT: NEGATIVE for ear, mouth and throat problems  RESP: NEGATIVE for significant cough or SOB  BREAST: NEGATIVE for masses, tenderness or discharge  CV: NEGATIVE for chest pain, palpitations or peripheral edema  GI: NEGATIVE for nausea, abdominal pain, heartburn, or change in bowel habits  : NEGATIVE for unusual urinary or vaginal symptoms. No vaginal bleeding.  MUSCULOSKELETAL: NEGATIVE for significant arthralgias or myalgia  NEURO: NEGATIVE for weakness, dizziness or paresthesias  ENDOCRINE: hot flashes and chills  PSYCHIATRIC: NEGATIVE for changes in mood or affect      OBJECTIVE:   Temp 97.5  F (36.4  C) (Temporal)   Ht 1.692 m (5' 6.6\")   Wt 56.7 kg (125 lb)   SpO2 95%   BMI 19.81 kg/m    Physical Exam  GENERAL: healthy, alert and no distress  EYES: Eyes grossly normal to inspection, PERRL and conjunctivae and sclerae normal  HENT: ear canals and TM's normal, nose and mouth without ulcers or lesions  NECK: no adenopathy, no asymmetry, masses, or scars and thyroid normal to palpation  RESP: lungs clear to auscultation - no rales, rhonchi or wheezes  BREAST: No breast exam done, we discussed self breast awareness and what to be concerned about, ie; retraction of a nipple, dimpling of the skin or any nipple discharge or aerolar rash that does not clear.   CV: regular rate and " rhythm, normal S1 S2, no S3 or S4, no murmur, click or rub, no peripheral edema and peripheral pulses strong  ABDOMEN: soft, nontender, no hepatosplenomegaly, no masses and bowel sounds normal   (female): Normal external genitalia is noted.  She does do shaved very close and has some follicular irritation around on the pubic mons area.  She has 1 resolving boil that this essentially just healing and now scarred in the upper inner left thigh about 2 to 3 cm from the inguinal canal.  I do not see any other skin lesions.  Internal genitalia mucosa is somewhat dry and atrophic due to lack of estrogen and lack of use.  She has not been sexually active at all with her .  They are estranged and really live apart.  Pap smear was obtained  With some bleeding from the cervix itself.  She had a little bit of light yellowish discharge in the vagina that I sampled for wet prep.  MS: no gross musculoskeletal defects noted, no edema  SKIN: no suspicious lesions or rashes and healing lesion on upper inner left thigh near the inguinal region that is nontender and not draining.  Some follicular irritation along the pubic mons region from shaving.   NEURO: Normal strength and tone, mentation intact and speech normal  PSYCH: mentation appears normal, affect normal/bright    Diagnostic Test Results:  Labs reviewed in Epic  Results for orders placed or performed in visit on 20 (from the past 24 hour(s))   Wet prep   Result Value Ref Range    Specimen Description Vagina     Wet Prep Many  Clue cells seen   (A)     Wet Prep No Trichomonas seen     Wet Prep No yeast seen     Wet Prep Many  PMNs seen          ASSESSMENT/PLAN:   (Z00.00) Routine general medical examination at a health care facility  (primary encounter diagnosis)  Comment: Overall doing well despite the fact that her aunt  recently and her mother is dealing with colon cancer.  Plan: HPV High Risk Types DNA Cervical, Pap imaged         thin layer screen with  "HPV - recommended age 30        - 65 years (select HPV order below)        Pap smear was obtained today.    (I10) Benign essential hypertension  Comment: Blood pressure is still elevated on the 50 mg of Cozaar  Plan: Comprehensive metabolic panel, Albumin Random         Urine Quantitative with Creat Ratio, losartan         (COZAAR) 100 MG tablet        We will increase her to 100 mg daily and have her come back in a couple months for recheck.    (E78.2) Mixed hyperlipidemia  Comment: She is due for labs for her lipids and her blood pressure so those will be drawn today.  She is tolerating her Lipitor.  Plan: Comprehensive metabolic panel, Lipid panel         reflex to direct LDL Fasting        Labs were obtained and refills sent.    (N89.8) Vaginal discharge  Comment: She has some increased vaginal discharge so wet prep was obtained  Plan: Wet prep        She does have any clue cells so we will treat her for bacterial vaginosis which is most likely causing her symptoms.      COUNSELING:  Reviewed preventive health counseling, as reflected in patient instructions       Regular exercise       Healthy diet/nutrition       Vision screening       Alcohol Use    Estimated body mass index is 19.81 kg/m  as calculated from the following:    Height as of this encounter: 1.692 m (5' 6.6\").    Weight as of this encounter: 56.7 kg (125 lb).         reports that she has been smoking cigarettes. She started smoking about 10 years ago. She has a 1.75 pack-year smoking history. She has never used smokeless tobacco.  Tobacco Cessation Action Plan: Information offered: Patient not interested at this time    Counseling Resources:  ATP IV Guidelines  Pooled Cohorts Equation Calculator  Breast Cancer Risk Calculator  FRAX Risk Assessment  ICSI Preventive Guidelines  Dietary Guidelines for Americans, 2010  USDA's MyPlate  ASA Prophylaxis  Lung CA Screening    Electronically signed by:  Galindo Esparza M.D.  1/22/2020    "

## 2020-01-25 ENCOUNTER — MYC MEDICAL ADVICE (OUTPATIENT)
Dept: FAMILY MEDICINE | Facility: CLINIC | Age: 55
End: 2020-01-25

## 2020-01-27 DIAGNOSIS — I10 BENIGN ESSENTIAL HYPERTENSION: ICD-10-CM

## 2020-01-27 LAB
COPATH REPORT: ABNORMAL
CREAT UR-MCNC: 145 MG/DL
MICROALBUMIN UR-MCNC: 14 MG/L
MICROALBUMIN/CREAT UR: 9.93 MG/G CR (ref 0–25)
PAP: ABNORMAL

## 2020-01-27 PROCEDURE — 82043 UR ALBUMIN QUANTITATIVE: CPT | Performed by: FAMILY MEDICINE

## 2020-01-27 NOTE — TELEPHONE ENCOUNTER
She needs the urine microalbumin, but the AST/ALT was done last week when we were in.  That letter was sent in error.     Electronically signed by:  Galindo Esparza M.D.  1/27/2020

## 2020-01-29 LAB
FINAL DIAGNOSIS: ABNORMAL
HPV HR 12 DNA CVX QL NAA+PROBE: POSITIVE
HPV16 DNA SPEC QL NAA+PROBE: NEGATIVE
HPV18 DNA SPEC QL NAA+PROBE: NEGATIVE
SPECIMEN DESCRIPTION: ABNORMAL
SPECIMEN SOURCE CVX/VAG CYTO: ABNORMAL

## 2020-01-30 NOTE — PROGRESS NOTES
12/9/15 NIL Pap, Neg HPV.  1/22/20 ASCUS pap, + HR HPV (not 16 or 18). Plan: [facility]  1/31/20 Pt notified by phone.  2/13/20 [facility] without bx. Visually normal. Plan: [facility] in 1 yr.

## 2020-01-31 ENCOUNTER — MYC MEDICAL ADVICE (OUTPATIENT)
Dept: FAMILY MEDICINE | Facility: CLINIC | Age: 55
End: 2020-01-31

## 2020-02-04 ENCOUNTER — TELEPHONE (OUTPATIENT)
Dept: FAMILY MEDICINE | Facility: CLINIC | Age: 55
End: 2020-02-04

## 2020-02-04 NOTE — TELEPHONE ENCOUNTER
Patient was notified. Patient was on an antibiotic for BV and she was told by the nurse that called her she stated that sometimes that medication can trigger results on pap. So she was nervous about that. She stated she will wait to have the colp.     Asuncion Ware MA 2/4/2020

## 2020-02-04 NOTE — TELEPHONE ENCOUNTER
We can not work her in sooner for the Colposcopy. She will have to wait for 2/13/2020 for .  KYLIE/MA

## 2020-02-04 NOTE — TELEPHONE ENCOUNTER
Reason for Call:  Work in Appointment, Requested Provider:  Galindo Esparza MD    PCP: Galindo Esparza    Reason for visit: [place] sooner, pt states she has appt on 2.13 and now she is bleeding (started today)  and would like to get appt moved up if possible. Please advise.     Duration of symptoms: today    Have you been treated for this in the past? No    Additional comments:     Can we leave a detailed message on this number? YES    Phone number patient can be reached at: Home number on file [phone] (home)    Best Time:     Call taken on 2/4/2020 at 10:22 AM by Shira Diaz

## 2020-02-13 ENCOUNTER — OFFICE VISIT (OUTPATIENT)
Dept: FAMILY MEDICINE | Facility: CLINIC | Age: 55
End: 2020-02-13
Payer: COMMERCIAL

## 2020-02-13 VITALS
TEMPERATURE: 97.4 F | BODY MASS INDEX: 19.66 KG/M2 | RESPIRATION RATE: 16 BRPM | DIASTOLIC BLOOD PRESSURE: 88 MMHG | HEART RATE: 80 BPM | SYSTOLIC BLOOD PRESSURE: 128 MMHG | WEIGHT: 124 LBS

## 2020-02-13 DIAGNOSIS — N76.0 BV (BACTERIAL VAGINOSIS): ICD-10-CM

## 2020-02-13 DIAGNOSIS — E78.2 MIXED HYPERLIPIDEMIA: ICD-10-CM

## 2020-02-13 DIAGNOSIS — B96.89 BV (BACTERIAL VAGINOSIS): ICD-10-CM

## 2020-02-13 DIAGNOSIS — R87.810 ASCUS WITH POSITIVE HIGH RISK HPV CERVICAL: Primary | ICD-10-CM

## 2020-02-13 DIAGNOSIS — R87.610 ASCUS WITH POSITIVE HIGH RISK HPV CERVICAL: Primary | ICD-10-CM

## 2020-02-13 LAB
ALT SERPL W P-5'-P-CCNC: 55 U/L (ref 0–50)
AST SERPL W P-5'-P-CCNC: 46 U/L (ref 0–45)
CHOLEST SERPL-MCNC: 248 MG/DL
HDLC SERPL-MCNC: 169 MG/DL
LDLC SERPL CALC-MCNC: 68 MG/DL
NONHDLC SERPL-MCNC: 79 MG/DL
SPECIMEN SOURCE: NORMAL
TRIGL SERPL-MCNC: 57 MG/DL
WET PREP SPEC: NORMAL

## 2020-02-13 PROCEDURE — 36415 COLL VENOUS BLD VENIPUNCTURE: CPT | Performed by: FAMILY MEDICINE

## 2020-02-13 PROCEDURE — 84450 TRANSFERASE (AST) (SGOT): CPT | Performed by: FAMILY MEDICINE

## 2020-02-13 PROCEDURE — 57452 EXAM OF CERVIX W/SCOPE: CPT | Performed by: FAMILY MEDICINE

## 2020-02-13 PROCEDURE — 84460 ALANINE AMINO (ALT) (SGPT): CPT | Performed by: FAMILY MEDICINE

## 2020-02-13 PROCEDURE — 80061 LIPID PANEL: CPT | Performed by: FAMILY MEDICINE

## 2020-02-13 PROCEDURE — 87210 SMEAR WET MOUNT SALINE/INK: CPT | Performed by: FAMILY MEDICINE

## 2020-02-13 ASSESSMENT — PAIN SCALES - GENERAL: PAINLEVEL: NO PAIN (0)

## 2020-02-13 NOTE — PROGRESS NOTES
Lorna Gorman is a  female who presents for initial colposcopy. Pap smear 3 weeks ago showed: ASC-US with HR HPV other. The prior pap showed normal.     Past Medical History:   Diagnosis Date     Abnormal Pap smear of cervix 01/22/2020    see problem list     Benign essential hypertension 5/18/2017     GENERALIZED ANXIETY DIS 5/15/2007     Hyperlipidemia with target LDL less than 130 11/6/2013     Hypertension goal BP (blood pressure) < 140/90 5/18/2017     Major depressive disorder, recurrent episode, mild (H) 12/9/2015     Migraine, unspecified, without mention of intractable migraine without mention of status migrainosus      Moderate Depression [296.32] 8/19/2009     Premenstrual tension syndromes     PMDS on Sarafem and doing very well.     Tobacco abuse 11/6/2013     Past Surgical History:   Procedure Laterality Date     C ANESTH,NOSE,SINUS SURGERY  2000     C RESEC HEAD OF PHALANX,TOE  2003     COLONOSCOPY N/A 2/9/2015    Procedure: COLONOSCOPY;  Surgeon: Ritesh Chiu MD;  Location:  GI     [facility] HYSTEROSCOPY, SURGICAL; W/ ENDOMETRIAL ABLATION, ANY METHOD  12/18/09     Social History     Socioeconomic History     Marital status:      Spouse name: Delbert     Number of children: 3     Years of education: 15     Highest education level: Not on file   Occupational History     Occupation:      Employer: [employer]   Social Needs     Financial resource strain: Not on file     Food insecurity:     Worry: Not on file     Inability: Not on file     Transportation needs:     Medical: Not on file     Non-medical: Not on file   Tobacco Use     Smoking status: Current Some Day Smoker     Packs/day: 0.25     Years: 7.00     Pack years: 1.75     Types: Cigarettes     Start date: 1/1/2010     Smokeless tobacco: Never Used     Tobacco comment: 3 cigarettes/day (6/22/17)   Substance and Sexual Activity     Alcohol use: Yes     Alcohol/week: 7.0 standard drinks     Types: 7 Glasses of wine per week     " Comment: \"here and there\"     Drug use: No     Sexual activity: Yes     Partners: Male     Comment:  had a vasectomy   Lifestyle     Physical activity:     Days per week: Not on file     Minutes per session: Not on file     Stress: Not on file   Relationships     Social connections:     Talks on phone: Not on file     Gets together: Not on file     Attends Catholic service: Not on file     Active member of club or organization: Not on file     Attends meetings of clubs or organizations: Not on file     Relationship status: Not on file     Intimate partner violence:     Fear of current or ex partner: Not on file     Emotionally abused: Not on file     Physically abused: Not on file     Forced sexual activity: Not on file   Other Topics Concern      Service No     Blood Transfusions No     Caffeine Concern Yes     Comment: more than 5 per day     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 Yes     Comment: Father  at age 40. CAD   Social History Narrative     Not on file      ASPIRIN PO,   atorvastatin (LIPITOR) 10 MG tablet, Take 1 tablet (10 mg) by mouth daily  hydrOXYzine (ATARAX) 25 MG tablet, Take 1 tablet (25 mg) by mouth nightly as needed for itching  losartan (COZAAR) 100 MG tablet, Take 1 tablet (100 mg) by mouth daily  triamcinolone (KENALOG) 0.1 % external cream, Apply topically 2 times daily For 10 days. No longer than 10 days.    No current facility-administered medications on file prior to visit.      Allergies   Allergen Reactions     No Known Allergies      Ragweeds        Previous history of abnormal paps?: No  History of cryotherapy (freezing)?: : No  History of veneral diseases: : " No  Do you desire testing for any of these diseases? : No  History of genital warts:  No  Visible warts now?:  No  Previously treated? If so, how?:  NA    No LMP recorded. Patient has had an ablation.  Type of contraception: menopausal  Age at first sexual intercourse: <20 yrs of age  Number of sexual partners (lifetime): >3    History of sexual abuse:  No      PROCEDURE:  Before the procedure, it was ensured that the patient was educated regarding the nature of her findings to date, the implications of them, and what was to be done. She has been made aware of the role of HPV, the natural history of infection, ways to minimize her future risk, the effect of HPV on the cervix, and treatment options available should they be indicated. The pathophysiology of the cervix, including a discussion of squamous vs. endometrial cells, and squamous metaplasia have all been reviewed, using illustrations and sketches. The details of the colposcopic procedure were reviewed, as well as the risks of missed diagnoses, pain, infection and bleeding. All questions were answered before proceeding, and informed consent was therefore obtained.    Bimanual examination: was not done  Unenhanced examination of the cervix was normal without lesions.  Pap smear and endocervical sampling not obtained due to:    not due  Please refer to images section for details!  Pap repeated?:  No  SCJ seen?:  yes  Endocervical speculum needed?:  Yes     Colposcopy/LEEP  Date/Time: 2/13/2020 3:46 PM  Performed by: Galindo Esparza MD  Authorized by: Galindo Esparza MD     Consent:     Consent obtained:  Written    Consent given by:  Patient    Procedural risks discussed:  Bleeding, possible continued pain and repeat procedure    Patient questions answered: yes      Patient agrees, verbalizes understanding, and wants to proceed: yes      Educational handouts given: no      Instructions and paperwork completed: yes    Pre-procedure:     Pre-procedure  timeout performed: yes      Prepped with: acetic acid and Lugol    Indication:     Indication:  ASC-US and HPV    HPV Indications:  Other high risk  Procedure:     Procedure: Colposcopy only      [place] speculum was placed in the vagina: yes      Under colposcopic examination the transition zone was seen in entirety: yes (with use of the endocervical speculum)      Biopsy(s): no    Post-procedure:     Findings: Negative      Impression: Normal appearing cervix      Patient tolerance of procedure:  Patient tolerated the procedure well with no immediate complications  Comments:      54-year-old female postmenopausal with a Pap smear showing ASCUS and HPV high risk other.  She had a sandro mitten bacterial vaginosis at the time that she had her Pap smear done.  Her colposcopy today showed no abnormal findings with acetic acid or Lugol's being applied to the cervix.  Endocervical speculum was needed to look at the squamo-columnar transition zone but that appeared normal also.  No biopsies were indicated.  Patient is aware that she will need a repeat Pap smear in 1 year.      OBGyn Exam     Prep to make sure that the bacterial vaginosis has cleared.  She is also been repeat her AST/ALT today to make sure that those labs are not becoming more abnormal on her cholesterol medication.    Electronically signed by:  Galindo Esparza M.D.  2/13/2020

## 2020-08-04 ENCOUNTER — E-VISIT (OUTPATIENT)
Dept: FAMILY MEDICINE | Facility: CLINIC | Age: 55
End: 2020-08-04
Payer: COMMERCIAL

## 2020-08-04 DIAGNOSIS — R30.0 DYSURIA: Primary | ICD-10-CM

## 2020-08-04 PROCEDURE — 99421 OL DIG E/M SVC 5-10 MIN: CPT | Performed by: FAMILY MEDICINE

## 2020-08-06 RX ORDER — SULFAMETHOXAZOLE/TRIMETHOPRIM 800-160 MG
1 TABLET ORAL 2 TIMES DAILY
Qty: 14 TABLET | Refills: 0 | Status: SHIPPED | OUTPATIENT
Start: 2020-08-06 | End: 2020-08-13

## 2020-08-06 NOTE — PATIENT INSTRUCTIONS
Thank you for choosing us for your care. I have placed an order for a prescription so that you can start treatment. View your full visit summary for details by clicking on the link below. Your pharmacist will able to address any questions you may have about the medication.     If you re not feeling better within 2-3 days, please schedule an appointment.  You can schedule an appointment right here in [facility], or call [phone]  If the visit is for the same symptoms as your e-visit, we ll refund the cost of your e-visit if seen within seven days.    Thank you for choosing us for your care. Given your symptoms, I would like you to do a lab-only visit to determine what is causing them.  I have placed the orders.  Please schedule an appointment with the lab right here in [facility], or call [phone].  I will let you know when the results are back and next steps to take.    Dysuria with Uncertain Cause (Adult)    The urethra is the tube that allows urine to pass out of the body. In a woman, the urethra is the opening above the vagina. In men, the urethra is the opening on the tip of the penis. Dysuria is the feeling of pain or burning in the urethra when passing urine.  Dysuria can be caused by anything that irritates or inflames the urethra. An infection or chemical irritation can cause this reaction. A bladder infection is the most common cause of dysuria in adults. A urine test can diagnose this. A bladder infection needs antibiotic treatment.  Soaps, lotions, colognes and feminine hygiene products can cause dysuria. So can birth control jellies, creams, and foams. It will go away 1 to 3 days after using these irritants.  Sexually transmitted diseases (STDs) such as chlamydia or gonorrhea can cause dysuria. Your healthcare provider may take a culture sample. Your provider may start you on antibiotic medicine before the culture test returns.  In women who have gone through menopause, dysuria can be from dryness in  the lining of the urethra. This can be treated with hormones. Dysuria becomes long-term (chronic) when it lasts for weeks or months. You may need to see a specialist (urologist) to diagnose and treat chronic dysuria.  Home care  These home care tips may help:    Don't use any chemicals or products that you think may be causing your symptoms.    If you were given a prescription medicine, take as directed. Be sure to take it until it is all used up.    If a culture was taken, don't have sex until you have been told that it is negative. This means you don't have an infection. Then follow your healthcare provider's advice to treat your condition.  If a culture was done and it is positive:    Both you and your sexual partner may need to be treated. This is true even if your partner has no symptoms.    Contact your healthcare provider or go to an urgent care clinic or the public health department to be looked at and treated.    Don't have sex until both you and your partner(s) have finished all antibiotics and your healthcare provider says you are no longer contagious.    Learn about and use safe sex practices. The safest sex is with a partner who has tested negative and only has sex with you. Condoms can prevent STDs from spreading, but they aren't a guarantee.  Follow-up care  Follow up with your healthcare provider, or as advised. If a culture was taken, you may call as directed for the results. If you have an STD, follow up with your provider or the public health department for a complete STD screening, including HIV testing. For more information, contact CDC-INFO at 030-050-4738.  When to seek medical advice  Call your healthcare provider right away if any of these occur:    You aren't better after 3 days of treatment    Fever of 100.4 F (38 C) or higher, or as directed by your healthcare provider    Back or belly pain that gets worse    You can't urinate because of pain    New discharge from the urethra, vagina, or  penis    Painful sores on the penis    Rash or joint pain    Painful lumps (lymph nodes) in the groin    Testicle pain or swelling of the scrotum  Date Last Reviewed: 11/1/2016 2000-2019 [employer]. [address] [place] [address], [place] [address]. All rights reserved. This information is not intended as a substitute for professional medical care. Always follow your healthcare professional's instructions.

## 2020-09-02 DIAGNOSIS — R30.0 DYSURIA: ICD-10-CM

## 2020-09-02 LAB
ALBUMIN UR-MCNC: NEGATIVE MG/DL
APPEARANCE UR: CLEAR
BILIRUB UR QL STRIP: NEGATIVE
COLOR UR AUTO: ABNORMAL
GLUCOSE UR STRIP-MCNC: NEGATIVE MG/DL
HGB UR QL STRIP: NEGATIVE
KETONES UR STRIP-MCNC: 5 MG/DL
LEUKOCYTE ESTERASE UR QL STRIP: ABNORMAL
NITRATE UR QL: NEGATIVE
PH UR STRIP: 6 PH (ref 5–7)
RBC #/AREA URNS AUTO: 0 /HPF (ref 0–2)
SOURCE: ABNORMAL
SP GR UR STRIP: 1 (ref 1–1.03)
UROBILINOGEN UR STRIP-MCNC: 0 MG/DL (ref 0–2)
WBC #/AREA URNS AUTO: 1 /HPF (ref 0–5)

## 2020-09-02 PROCEDURE — 81001 URINALYSIS AUTO W/SCOPE: CPT | Performed by: FAMILY MEDICINE

## 2020-10-30 DIAGNOSIS — E78.2 MIXED HYPERLIPIDEMIA: ICD-10-CM

## 2020-10-30 RX ORDER — ATORVASTATIN CALCIUM 10 MG/1
TABLET, FILM COATED ORAL
Qty: 90 TABLET | Refills: 1 | Status: SHIPPED | OUTPATIENT
Start: 2020-10-30 | End: 2021-04-06

## 2020-10-30 NOTE — TELEPHONE ENCOUNTER
Prescription approved per [facility] Refill Protocol.    Mimi Corbett RN on 10/30/2020 at 11:04 AM

## 2020-11-18 ENCOUNTER — HOSPITAL ENCOUNTER (OUTPATIENT)
Dept: MAMMOGRAPHY | Facility: CLINIC | Age: 55
Discharge: HOME | End: 2020-11-18
Attending: FAMILY MEDICINE | Admitting: FAMILY MEDICINE
Payer: COMMERCIAL

## 2020-11-18 DIAGNOSIS — Z12.31 VISIT FOR SCREENING MAMMOGRAM: ICD-10-CM

## 2020-11-18 PROCEDURE — 77067 SCR MAMMO BI INCL CAD: CPT

## 2021-01-29 ENCOUNTER — PATIENT OUTREACH (OUTPATIENT)
Dept: FAMILY MEDICINE | Facility: CLINIC | Age: 56
End: 2021-01-29

## 2021-01-29 PROBLEM — R87.610 ASCUS WITH POSITIVE HIGH RISK HPV CERVICAL: Status: ACTIVE | Noted: 2020-01-22

## 2021-01-29 PROBLEM — R87.810 ASCUS WITH POSITIVE HIGH RISK HPV CERVICAL: Status: ACTIVE | Noted: 2020-01-22

## 2021-01-30 DIAGNOSIS — I10 BENIGN ESSENTIAL HYPERTENSION: ICD-10-CM

## 2021-01-30 DIAGNOSIS — E78.2 MIXED HYPERLIPIDEMIA: Primary | ICD-10-CM

## 2021-02-01 RX ORDER — LOSARTAN POTASSIUM 100 MG/1
100 TABLET ORAL DAILY
Qty: 90 TABLET | Refills: 0 | Status: SHIPPED | OUTPATIENT
Start: 2021-02-01 | End: 2021-04-06

## 2021-02-01 RX ORDER — LOSARTAN POTASSIUM 100 MG/1
TABLET ORAL
Qty: 330 TABLET | Refills: 0 | Status: SHIPPED | OUTPATIENT
Start: 2021-02-01 | End: 2021-02-01

## 2021-02-01 NOTE — TELEPHONE ENCOUNTER
Routing refill request to provider for review/approval because:  Labs not current:  Creatinine, Potassium    Sarah Hudson Rn

## 2021-02-02 NOTE — TELEPHONE ENCOUNTER
I placed orders for her labs which are due.  Please let her know that she can come in at her convenience to do those.  She is due for an office visit sometime in February or March also.    Approved Electronically signed by:  Galindo Esparza M.D.  2/1/2021

## 2021-02-02 NOTE — TELEPHONE ENCOUNTER
Spoke to patient, lab appointment scheduled. She already has an office visit scheduled in February

## 2021-02-05 DIAGNOSIS — E78.2 MIXED HYPERLIPIDEMIA: ICD-10-CM

## 2021-02-05 DIAGNOSIS — I10 BENIGN ESSENTIAL HYPERTENSION: ICD-10-CM

## 2021-02-05 LAB
ALBUMIN SERPL-MCNC: 3.6 G/DL (ref 3.4–5)
ALP SERPL-CCNC: 87 U/L (ref 40–150)
ALT SERPL W P-5'-P-CCNC: 82 U/L (ref 0–50)
ANION GAP SERPL CALCULATED.3IONS-SCNC: 6 MMOL/L (ref 3–14)
AST SERPL W P-5'-P-CCNC: 71 U/L (ref 0–45)
BILIRUB SERPL-MCNC: 0.5 MG/DL (ref 0.2–1.3)
BUN SERPL-MCNC: 12 MG/DL (ref 7–30)
CALCIUM SERPL-MCNC: 8.6 MG/DL (ref 8.5–10.1)
CHLORIDE SERPL-SCNC: 100 MMOL/L (ref 94–109)
CHOLEST SERPL-MCNC: 252 MG/DL
CO2 SERPL-SCNC: 28 MMOL/L (ref 20–32)
CREAT SERPL-MCNC: 0.66 MG/DL (ref 0.52–1.04)
CREAT UR-MCNC: 122 MG/DL
GFR SERPL CREATININE-BSD FRML MDRD: >90 ML/MIN/{1.73_M2}
GLUCOSE SERPL-MCNC: 91 MG/DL (ref 70–99)
HDLC SERPL-MCNC: 152 MG/DL
LDLC SERPL CALC-MCNC: 74 MG/DL
MICROALBUMIN UR-MCNC: 9 MG/L
MICROALBUMIN/CREAT UR: 7.76 MG/G CR (ref 0–25)
NONHDLC SERPL-MCNC: 100 MG/DL
POTASSIUM SERPL-SCNC: 4.1 MMOL/L (ref 3.4–5.3)
PROT SERPL-MCNC: 7.1 G/DL (ref 6.8–8.8)
SODIUM SERPL-SCNC: 134 MMOL/L (ref 133–144)
TRIGL SERPL-MCNC: 129 MG/DL

## 2021-02-05 PROCEDURE — 80053 COMPREHEN METABOLIC PANEL: CPT | Performed by: FAMILY MEDICINE

## 2021-02-05 PROCEDURE — 36415 COLL VENOUS BLD VENIPUNCTURE: CPT | Performed by: FAMILY MEDICINE

## 2021-02-05 PROCEDURE — 80061 LIPID PANEL: CPT | Performed by: FAMILY MEDICINE

## 2021-02-05 PROCEDURE — 82043 UR ALBUMIN QUANTITATIVE: CPT | Performed by: FAMILY MEDICINE

## 2021-02-25 ENCOUNTER — OFFICE VISIT (OUTPATIENT)
Dept: FAMILY MEDICINE | Facility: CLINIC | Age: 56
End: 2021-02-25
Payer: COMMERCIAL

## 2021-02-25 VITALS
TEMPERATURE: 97.6 F | BODY MASS INDEX: 21.35 KG/M2 | RESPIRATION RATE: 18 BRPM | HEART RATE: 100 BPM | HEIGHT: 67 IN | DIASTOLIC BLOOD PRESSURE: 84 MMHG | SYSTOLIC BLOOD PRESSURE: 130 MMHG | WEIGHT: 136 LBS | OXYGEN SATURATION: 95 %

## 2021-02-25 DIAGNOSIS — Z72.0 TOBACCO ABUSE DISORDER: ICD-10-CM

## 2021-02-25 DIAGNOSIS — R87.810 ASCUS WITH POSITIVE HIGH RISK HPV CERVICAL: ICD-10-CM

## 2021-02-25 DIAGNOSIS — R87.610 ASCUS WITH POSITIVE HIGH RISK HPV CERVICAL: ICD-10-CM

## 2021-02-25 DIAGNOSIS — Z12.11 COLON CANCER SCREENING: ICD-10-CM

## 2021-02-25 DIAGNOSIS — Z80.0 FAMILY HISTORY OF COLON CANCER: ICD-10-CM

## 2021-02-25 DIAGNOSIS — Z00.00 ROUTINE GENERAL MEDICAL EXAMINATION AT A HEALTH CARE FACILITY: Primary | ICD-10-CM

## 2021-02-25 PROCEDURE — 99396 PREV VISIT EST AGE 40-64: CPT | Performed by: FAMILY MEDICINE

## 2021-02-25 PROCEDURE — 87624 HPV HI-RISK TYP POOLED RSLT: CPT | Performed by: FAMILY MEDICINE

## 2021-02-25 PROCEDURE — G0145 SCR C/V CYTO,THINLAYER,RESCR: HCPCS | Performed by: FAMILY MEDICINE

## 2021-02-25 SDOH — HEALTH STABILITY: MENTAL HEALTH
STRESS IS WHEN SOMEONE FEELS TENSE, NERVOUS, ANXIOUS, OR CAN'T SLEEP AT NIGHT BECAUSE THEIR MIND IS TROUBLED. HOW STRESSED ARE YOU?: NOT ASKED

## 2021-02-25 SDOH — HEALTH STABILITY: PHYSICAL HEALTH: ON AVERAGE, HOW MANY DAYS PER WEEK DO YOU ENGAGE IN MODERATE TO STRENUOUS EXERCISE (LIKE A BRISK WALK)?: 0 DAYS

## 2021-02-25 SDOH — HEALTH STABILITY: MENTAL HEALTH: HOW MANY STANDARD DRINKS CONTAINING ALCOHOL DO YOU HAVE ON A TYPICAL DAY?: 1 OR 2

## 2021-02-25 SDOH — HEALTH STABILITY: MENTAL HEALTH: HOW OFTEN DO YOU HAVE 6 OR MORE DRINKS ON ONE OCCASION?: DAILY OR ALMOST DAILY

## 2021-02-25 SDOH — HEALTH STABILITY: MENTAL HEALTH: HOW OFTEN DO YOU HAVE A DRINK CONTAINING ALCOHOL?: 4 OR MORE TIMES A WEEK

## 2021-02-25 SDOH — HEALTH STABILITY: PHYSICAL HEALTH: ON AVERAGE, HOW MANY MINUTES DO YOU ENGAGE IN EXERCISE AT THIS LEVEL?: 0 MIN

## 2021-02-25 ASSESSMENT — MIFFLIN-ST. JEOR: SCORE: 1233.17

## 2021-02-25 ASSESSMENT — ANXIETY QUESTIONNAIRES
5. BEING SO RESTLESS THAT IT IS HARD TO SIT STILL: SEVERAL DAYS
1. FEELING NERVOUS, ANXIOUS, OR ON EDGE: MORE THAN HALF THE DAYS
7. FEELING AFRAID AS IF SOMETHING AWFUL MIGHT HAPPEN: NEARLY EVERY DAY
IF YOU CHECKED OFF ANY PROBLEMS ON THIS QUESTIONNAIRE, HOW DIFFICULT HAVE THESE PROBLEMS MADE IT FOR YOU TO DO YOUR WORK, TAKE CARE OF THINGS AT HOME, OR GET ALONG WITH OTHER PEOPLE: SOMEWHAT DIFFICULT
2. NOT BEING ABLE TO STOP OR CONTROL WORRYING: MORE THAN HALF THE DAYS
6. BECOMING EASILY ANNOYED OR IRRITABLE: NOT AT ALL
3. WORRYING TOO MUCH ABOUT DIFFERENT THINGS: NEARLY EVERY DAY
GAD7 TOTAL SCORE: 13

## 2021-02-25 ASSESSMENT — PATIENT HEALTH QUESTIONNAIRE - PHQ9
5. POOR APPETITE OR OVEREATING: MORE THAN HALF THE DAYS
SUM OF ALL RESPONSES TO PHQ QUESTIONS 1-9: 14

## 2021-02-25 ASSESSMENT — PAIN SCALES - GENERAL: PAINLEVEL: NO PAIN (0)

## 2021-02-25 NOTE — PROGRESS NOTES
SUBJECTIVE:   CC: Lorna Gorman is an 55 year old woman who presents for preventive health visit.       Patient has been advised of split billing requirements and indicates understanding: Yes  HPI      PROBLEMS TO ADD ON  Smoking cessation  Colonoscopy due to family history of colon cancer    Today's PHQ-2 Score:   PHQ-2 ( 1999 Pfizer) 2/25/2021   Q1: Little interest or pleasure in doing things 3   Q2: Feeling down, depressed or hopeless 3   PHQ-2 Score 6   Q1: Little interest or pleasure in doing things -   Q2: Feeling down, depressed or hopeless -   PHQ-2 Score -       Abuse: Current or Past (Physical, Sexual or Emotional) - No  Do you feel safe in your environment? YES    Social History     Tobacco Use     Smoking status: Current Some Day Smoker     Packs/day: 0.25     Years: 7.00     Pack years: 1.75     Types: Cigarettes     Start date: 1/1/2010     Smokeless tobacco: Never Used     Tobacco comment: 3 cigarettes/day (6/22/17)   Substance Use Topics     Alcohol use: Yes     Alcohol/week: 7.0 standard drinks     Types: 7 Glasses of wine per week     Frequency: 4 or more times a week     Drinks per session: 1 or 2     Binge frequency: Daily or almost daily     Comment: daily 1-2 wine     If you drink alcohol do you typically have >3 drinks per day or >7 drinks per week? Yes up to 2 or more glasses of wine daily      Any new diagnosis of family breast, ovarian, or bowel cancer? Yes  Colon cancer, mother, age 80.     Reviewed orders with patient.  Reviewed health maintenance and updated orders accordingly - Yes  Lab work is in process    Breast CA Risk Screening:  No flowsheet data found.     Mammogram Screening: Recommended mammography every 1-2 years with patient discussion and risk factor consideration  Pertinent mammograms are reviewed under the imaging tab.    History of abnormal Pap smear:   Last 3 Pap Results:   PAP (no units)   Date Value   01/22/2020 ASC-US (A)   12/09/2015 NIL   10/23/2012 NIL     PAP  / HPV Latest Ref Rng & Units 1/22/2020 12/9/2015 10/23/2012   PAP - ASC-US(A) NIL NIL   HPV 16 DNA NEG:Negative Negative Negative -   HPV 18 DNA NEG:Negative Negative Negative -   OTHER HR HPV NEG:Negative Positive(A) Negative -     Reviewed and updated as needed this visit by clinical staff  Tobacco  Allergies  Meds  Problems  Med Hx  Surg Hx  Fam Hx  Soc Hx          Reviewed and updated as needed this visit by Provider  Tobacco       Fam Hx  Soc Hx         Patient is a smoker, trying to quit.  Patient drinks 2+ glasses of wine per night.   Typically lives alone at her cabin in [place]  Has isolated herself, very COVID conscious. Cares for immunocompromised elderly.   Sexually active, with male friend   but  from , living separately.   Caring for mother, who is 82 and ill with metastatic colon cancer. Her mother is currently staying with her under her care while getting chemo treatment. She also is organizing ill uncle's home, after his recent admission to a memory care unit.       Review of Systems  CONSTITUTIONAL: Gained 12 lbs in 1 year. NEGATIVE for fever, chills  INTEGUMENTARY/SKIN: NEGATIVE for worrisome rashes, moles or lesions  EYES: NEGATIVE for vision changes or irritation  ENT: NEGATIVE for ear, mouth and throat problems  RESP: NEGATIVE for significant cough or SOB  BREAST: NEGATIVE for masses, tenderness or discharge  CV: NEGATIVE for chest pain, palpitations or peripheral edema  GI: NEGATIVE for nausea, abdominal pain, heartburn, or change in bowel habits  : NEGATIVE for unusual urinary or vaginal symptoms. No vaginal bleeding.  MUSCULOSKELETAL: NEGATIVE for significant arthralgias or myalgia  NEURO: NEGATIVE for weakness, dizziness or paresthesias  ENDOCRINE: NEGATIVE for temperature intolerance, skin/hair changes  PSYCHIATRIC: NEGATIVE for changes in mood or affect      OBJECTIVE:   /84   Pulse 100   Temp 97.6  F (36.4  C) (Temporal)   Resp 18   Ht 1.692 m  "(5' 6.6\")   Wt 61.7 kg (136 lb)   SpO2 95%   BMI 21.56 kg/m    Physical Exam  GENERAL: healthy, alert and no distress,sitting comfortably in chair  EYES: Eyes grossly normal to inspection, PERRL and conjunctivae and sclerae normal  HENT: ear canals and TM's normal, nose and mouth without ulcers or lesions  NECK: no adenopathy, no asymmetry, masses, or scars and thyroid normal to palpation  RESP: lungs clear to auscultation - no rales, rhonchi or wheezes  CV: regular rate and rhythm, normal S1 S2, no S3 or S4, no murmur, click or rub, no peripheral edema and peripheral pulses strong  ABDOMEN: soft, nontender, no hepatosplenomegaly, no masses and bowel sounds normal   (female): normal female external genitalia, normal urethral meatus, vaginal mucosa pink, moist, well rugated, and normal cervix/adnexa/uterus without masses or discharge. PAP test completed.   MS: no gross musculoskeletal defects noted, no edema  SKIN: no suspicious lesions or rashes  NEURO: Normal strength and tone, mentation intact and speech normal  PSYCH: mentation appears normal, affect normal/bright    Diagnostic Test Results:  Labs reviewed in Epic    ASSESSMENT/PLAN:   Lorna was seen today for gyn exam.    Diagnoses and all orders for this visit:    Routine general medical examination at a health care facility    Colon cancer screening  -     GASTROENTEROLOGY ADULT REF PROCEDURE ONLY; Future due to family history    Family history of colon cancer  -     GASTROENTEROLOGY ADULT REF PROCEDURE ONLY; Future    ASCUS with positive high risk HPV cervical  -     Pap imaged thin layer screen with HPV - recommended age 30 - 65 years (select HPV order below)  -     HPV High Risk Types DNA Cervical    Tobacco abuse disorder  -     nicotine (NICORETTE) 2 MG gum; Place 1 each (2 mg) inside cheek as needed for smoking cessation        Patient has been advised of split billing requirements and indicates understanding: Yes  COUNSELING:  Reviewed preventive " "health counseling, as reflected in patient instructions  Special attention given to:        Regular exercise       Healthy diet/nutrition       Vision screening       Hearing screening       Safe sex practices/STD prevention       Colon cancer screening    Estimated body mass index is 21.56 kg/m  as calculated from the following:    Height as of this encounter: 1.692 m (5' 6.6\").    Weight as of this encounter: 61.7 kg (136 lb).        She reports that she has been smoking cigarettes. She started smoking about 11 years ago. She has a 1.75 pack-year smoking history. She has never used smokeless tobacco.  Tobacco Cessation Action Plan:   Pharmacotherapies : other Nicotine replacement      Counseling Resources:  ATP IV Guidelines  Pooled Cohorts Equation Calculator  Breast Cancer Risk Calculator  BRCA-Related Cancer Risk Assessment: FHS-7 Tool  FRAX Risk Assessment  ICSI Preventive Guidelines  Dietary Guidelines for Americans, 2010  USDA's MyPlate  ASA Prophylaxis  Lung CA Screening    This document serves as a record of the services and decisions personally performed and made by Galindo Esparza MD.  It was created on his/her behalf by Reina Mandel, a trained PA student and scribe.  The creation of this record is based on the provider's personal observations and the statements of the patient.   February 25, 2021      VESTA Mcghee-S2    I agree with the PFSH and ROS as completed by the PA,S.  The remainder of the encounter was performed by me and scribed by the PA,S.  The scribed note accurately reflects my personal services and the decisions made by me.     Electronically signed by:  Galindo Esparza M.D.  2/25/2021     "

## 2021-02-25 NOTE — PROGRESS NOTES
"  Meera Rothman is a 55 year old who presents for the following health issues     HPI       Chief Complaint   Patient presents with     Gyn Exam       {additonal problems for provider to add (Optional):692502}    Review of Systems   {ROS COMP (Optional):986973}      Objective    /84   Pulse 100   Temp 97.6  F (36.4  C) (Temporal)   Resp 18   Ht 1.692 m (5' 6.6\")   Wt 61.7 kg (136 lb)   SpO2 95%   BMI 21.56 kg/m    Body mass index is 21.56 kg/m .  Physical Exam   {Exam List (Optional):160559}    {Diagnostic Test Results (Optional):477514}    {AMBULATORY ATTESTATION (Optional):004849}          "

## 2021-02-26 ENCOUNTER — HOSPITAL ENCOUNTER (OUTPATIENT)
Facility: CLINIC | Age: 56
End: 2021-02-26
Attending: SURGERY | Admitting: SURGERY
Payer: COMMERCIAL

## 2021-02-26 ENCOUNTER — TELEPHONE (OUTPATIENT)
Dept: FAMILY MEDICINE | Facility: CLINIC | Age: 56
End: 2021-02-26

## 2021-02-26 ASSESSMENT — ANXIETY QUESTIONNAIRES: GAD7 TOTAL SCORE: 13

## 2021-02-26 NOTE — TELEPHONE ENCOUNTER
Date of procedure: 3/25/21  Colonoscopy  Surgeon: Dr. Daniel  Prep:Miralax  Packet:Colonoscopy/EGD instructions were sent to the patient in [facility].   Date: 2/26/2021      Surgery Scheduler

## 2021-02-26 NOTE — LETTER

## 2021-03-02 LAB
COPATH REPORT: NORMAL
PAP: NORMAL

## 2021-03-04 LAB
FINAL DIAGNOSIS: NORMAL
HPV HR 12 DNA CVX QL NAA+PROBE: NEGATIVE
HPV16 DNA SPEC QL NAA+PROBE: NEGATIVE
HPV18 DNA SPEC QL NAA+PROBE: NEGATIVE
SPECIMEN DESCRIPTION: NORMAL
SPECIMEN SOURCE CVX/VAG CYTO: NORMAL

## 2021-03-07 DIAGNOSIS — Z11.59 ENCOUNTER FOR SCREENING FOR OTHER VIRAL DISEASES: ICD-10-CM

## 2021-04-06 DIAGNOSIS — I10 BENIGN ESSENTIAL HYPERTENSION: ICD-10-CM

## 2021-04-06 DIAGNOSIS — E78.2 MIXED HYPERLIPIDEMIA: ICD-10-CM

## 2021-04-06 RX ORDER — LOSARTAN POTASSIUM 100 MG/1
100 TABLET ORAL DAILY
Qty: 90 TABLET | Refills: 1 | Status: SHIPPED | OUTPATIENT
Start: 2021-04-06 | End: 2021-09-10

## 2021-04-06 RX ORDER — ATORVASTATIN CALCIUM 10 MG/1
10 TABLET, FILM COATED ORAL DAILY
Qty: 90 TABLET | Refills: 1 | Status: SHIPPED | OUTPATIENT
Start: 2021-04-06 | End: 2021-09-16

## 2021-04-06 NOTE — TELEPHONE ENCOUNTER
Cozaar and Lipitor  Prescription approved per [facility] Refill Protocol.    Sarah Hudson RN

## 2021-09-02 ENCOUNTER — MYC MEDICAL ADVICE (OUTPATIENT)
Dept: FAMILY MEDICINE | Facility: CLINIC | Age: 56
End: 2021-09-02

## 2021-09-02 DIAGNOSIS — F41.1 GAD (GENERALIZED ANXIETY DISORDER): ICD-10-CM

## 2021-09-10 DIAGNOSIS — I10 BENIGN ESSENTIAL HYPERTENSION: ICD-10-CM

## 2021-09-10 RX ORDER — LOSARTAN POTASSIUM 100 MG/1
100 TABLET ORAL DAILY
Qty: 90 TABLET | Refills: 1 | Status: SHIPPED | OUTPATIENT
Start: 2021-09-10 | End: 2022-03-28

## 2021-09-14 DIAGNOSIS — E78.2 MIXED HYPERLIPIDEMIA: ICD-10-CM

## 2021-09-16 RX ORDER — ATORVASTATIN CALCIUM 10 MG/1
10 TABLET, FILM COATED ORAL DAILY
Qty: 90 TABLET | Refills: 1 | Status: SHIPPED | OUTPATIENT
Start: 2021-09-16 | End: 2022-02-11

## 2021-10-23 ENCOUNTER — HEALTH MAINTENANCE LETTER (OUTPATIENT)
Age: 56
End: 2021-10-23

## 2021-11-01 ENCOUNTER — HOSPITAL ENCOUNTER (OUTPATIENT)
Dept: BONE DENSITY | Facility: CLINIC | Age: 56
Discharge: HOME | End: 2021-11-01
Attending: FAMILY MEDICINE | Admitting: FAMILY MEDICINE
Payer: COMMERCIAL

## 2021-11-01 DIAGNOSIS — Z78.0 MENOPAUSE PRESENT: ICD-10-CM

## 2021-11-01 DIAGNOSIS — S62.102S CLOSED FRACTURE OF LEFT WRIST, SEQUELA: ICD-10-CM

## 2021-11-01 PROCEDURE — 77080 DXA BONE DENSITY AXIAL: CPT

## 2021-11-02 ENCOUNTER — MYC MEDICAL ADVICE (OUTPATIENT)
Dept: FAMILY MEDICINE | Facility: CLINIC | Age: 56
End: 2021-11-02

## 2021-11-02 DIAGNOSIS — M85.851 OSTEOPENIA OF BOTH HIPS: Primary | ICD-10-CM

## 2021-11-02 DIAGNOSIS — M85.852 OSTEOPENIA OF BOTH HIPS: Primary | ICD-10-CM

## 2021-11-02 RX ORDER — ALENDRONATE SODIUM 35 MG/1
35 TABLET ORAL
Qty: 12 TABLET | Refills: 3 | Status: SHIPPED | OUTPATIENT
Start: 2021-11-02 | End: 2022-06-30

## 2021-12-15 ENCOUNTER — HOSPITAL ENCOUNTER (OUTPATIENT)
Dept: MAMMOGRAPHY | Facility: CLINIC | Age: 56
Discharge: HOME | End: 2021-12-15
Attending: FAMILY MEDICINE | Admitting: FAMILY MEDICINE
Payer: COMMERCIAL

## 2021-12-15 DIAGNOSIS — Z12.31 VISIT FOR SCREENING MAMMOGRAM: ICD-10-CM

## 2021-12-15 PROCEDURE — 77067 SCR MAMMO BI INCL CAD: CPT

## 2022-02-08 DIAGNOSIS — E78.2 MIXED HYPERLIPIDEMIA: ICD-10-CM

## 2022-02-08 NOTE — TELEPHONE ENCOUNTER
Routing refill request to provider for review/approval because:  Labs not current:  MARGARITA Hudson Rn

## 2022-02-11 RX ORDER — ATORVASTATIN CALCIUM 10 MG/1
10 TABLET, FILM COATED ORAL DAILY
Qty: 90 TABLET | Refills: 0 | Status: SHIPPED | OUTPATIENT
Start: 2022-02-11 | End: 2022-05-18

## 2022-03-27 DIAGNOSIS — I10 BENIGN ESSENTIAL HYPERTENSION: ICD-10-CM

## 2022-03-28 RX ORDER — LOSARTAN POTASSIUM 100 MG/1
100 TABLET ORAL DAILY
Qty: 90 TABLET | Refills: 0 | Status: SHIPPED | OUTPATIENT
Start: 2022-03-28 | End: 2022-06-30

## 2022-03-28 NOTE — TELEPHONE ENCOUNTER
Due for yearly exam and labs.  Please schedule.    Electronically signed by:  Galindo Esparza M.D.  3/28/2022

## 2022-03-28 NOTE — TELEPHONE ENCOUNTER
Routing refill request to provider for review/approval because:  Labs not current:  Creatinine, potassium  Patient needs to be seen because it has been more than 1 year since last office visit.  No current BP on file.     Sarah Hudson RN

## 2022-04-09 ENCOUNTER — HEALTH MAINTENANCE LETTER (OUTPATIENT)
Age: 57
End: 2022-04-09

## 2022-05-17 DIAGNOSIS — I10 BENIGN ESSENTIAL HYPERTENSION: Primary | ICD-10-CM

## 2022-05-17 DIAGNOSIS — E78.2 MIXED HYPERLIPIDEMIA: ICD-10-CM

## 2022-05-17 NOTE — TELEPHONE ENCOUNTER
Routing refill request to provider for review/approval because:  Labs not current:  LDL  Patient needs to be seen because it has been more than 1 year since last office visit.    Sarah Hudson Rn

## 2022-05-18 RX ORDER — ATORVASTATIN CALCIUM 10 MG/1
10 TABLET, FILM COATED ORAL DAILY
Qty: 90 TABLET | Refills: 0 | Status: SHIPPED | OUTPATIENT
Start: 2022-05-18 | End: 2022-11-11

## 2022-05-18 NOTE — TELEPHONE ENCOUNTER
Lab orders were placed.  Patient is due for her yearly adult well exam.  Please schedule her     Electronically signed by:  Galindo Esparza M.D.  5/18/2022

## 2022-06-27 ASSESSMENT — ENCOUNTER SYMPTOMS
NERVOUS/ANXIOUS: 1
JOINT SWELLING: 0
HEARTBURN: 0
COUGH: 0
SHORTNESS OF BREATH: 0
PALPITATIONS: 0
PARESTHESIAS: 0
CONSTIPATION: 0
DIARRHEA: 0
DIZZINESS: 0
ABDOMINAL PAIN: 0
ARTHRALGIAS: 0
FEVER: 0
BREAST MASS: 0
HEMATURIA: 0
NAUSEA: 0
MYALGIAS: 0
SORE THROAT: 0
CHILLS: 0
DYSURIA: 0
HEMATOCHEZIA: 0
EYE PAIN: 0
WEAKNESS: 0
HEADACHES: 0
FREQUENCY: 0

## 2022-06-27 ASSESSMENT — PATIENT HEALTH QUESTIONNAIRE - PHQ9
10. IF YOU CHECKED OFF ANY PROBLEMS, HOW DIFFICULT HAVE THESE PROBLEMS MADE IT FOR YOU TO DO YOUR WORK, TAKE CARE OF THINGS AT HOME, OR GET ALONG WITH OTHER PEOPLE: SOMEWHAT DIFFICULT
SUM OF ALL RESPONSES TO PHQ QUESTIONS 1-9: 10
SUM OF ALL RESPONSES TO PHQ QUESTIONS 1-9: 10

## 2022-06-30 ENCOUNTER — OFFICE VISIT (OUTPATIENT)
Dept: FAMILY MEDICINE | Facility: CLINIC | Age: 57
End: 2022-06-30
Payer: COMMERCIAL

## 2022-06-30 VITALS
BODY MASS INDEX: 24.11 KG/M2 | WEIGHT: 150 LBS | HEART RATE: 115 BPM | OXYGEN SATURATION: 97 % | SYSTOLIC BLOOD PRESSURE: 126 MMHG | DIASTOLIC BLOOD PRESSURE: 80 MMHG | HEIGHT: 66 IN | TEMPERATURE: 98.8 F

## 2022-06-30 DIAGNOSIS — F41.0 ANXIETY ATTACK: ICD-10-CM

## 2022-06-30 DIAGNOSIS — Z00.00 ROUTINE GENERAL MEDICAL EXAMINATION AT A HEALTH CARE FACILITY: Primary | ICD-10-CM

## 2022-06-30 DIAGNOSIS — I10 BENIGN ESSENTIAL HYPERTENSION: ICD-10-CM

## 2022-06-30 DIAGNOSIS — M85.852 OSTEOPENIA OF BOTH HIPS: ICD-10-CM

## 2022-06-30 DIAGNOSIS — G47.09 OTHER INSOMNIA: ICD-10-CM

## 2022-06-30 DIAGNOSIS — E78.2 MIXED HYPERLIPIDEMIA: ICD-10-CM

## 2022-06-30 DIAGNOSIS — M85.851 OSTEOPENIA OF BOTH HIPS: ICD-10-CM

## 2022-06-30 DIAGNOSIS — F41.1 GAD (GENERALIZED ANXIETY DISORDER): ICD-10-CM

## 2022-06-30 LAB
ALT SERPL W P-5'-P-CCNC: 278 U/L (ref 0–50)
ANION GAP SERPL CALCULATED.3IONS-SCNC: 6 MMOL/L (ref 3–14)
AST SERPL W P-5'-P-CCNC: 271 U/L (ref 0–45)
BUN SERPL-MCNC: 14 MG/DL (ref 7–30)
CALCIUM SERPL-MCNC: 8.7 MG/DL (ref 8.5–10.1)
CHLORIDE BLD-SCNC: 104 MMOL/L (ref 94–109)
CHOLEST SERPL-MCNC: 289 MG/DL
CO2 SERPL-SCNC: 30 MMOL/L (ref 20–32)
CREAT SERPL-MCNC: 0.77 MG/DL (ref 0.52–1.04)
CREAT UR-MCNC: 187 MG/DL
FASTING STATUS PATIENT QL REPORTED: YES
GFR SERPL CREATININE-BSD FRML MDRD: 89 ML/MIN/1.73M2
GLUCOSE BLD-MCNC: 86 MG/DL (ref 70–99)
HDLC SERPL-MCNC: 149 MG/DL
LDLC SERPL CALC-MCNC: 127 MG/DL
MICROALBUMIN UR-MCNC: 11 MG/L
MICROALBUMIN/CREAT UR: 5.88 MG/G CR (ref 0–25)
NONHDLC SERPL-MCNC: 140 MG/DL
POTASSIUM BLD-SCNC: 4.2 MMOL/L (ref 3.4–5.3)
SODIUM SERPL-SCNC: 140 MMOL/L (ref 133–144)
TRIGL SERPL-MCNC: 63 MG/DL

## 2022-06-30 PROCEDURE — 99214 OFFICE O/P EST MOD 30 MIN: CPT | Mod: 25 | Performed by: FAMILY MEDICINE

## 2022-06-30 PROCEDURE — 82043 UR ALBUMIN QUANTITATIVE: CPT | Performed by: FAMILY MEDICINE

## 2022-06-30 PROCEDURE — 80048 BASIC METABOLIC PNL TOTAL CA: CPT | Performed by: FAMILY MEDICINE

## 2022-06-30 PROCEDURE — 84450 TRANSFERASE (AST) (SGOT): CPT | Performed by: FAMILY MEDICINE

## 2022-06-30 PROCEDURE — 80061 LIPID PANEL: CPT | Performed by: FAMILY MEDICINE

## 2022-06-30 PROCEDURE — 84460 ALANINE AMINO (ALT) (SGPT): CPT | Performed by: FAMILY MEDICINE

## 2022-06-30 PROCEDURE — 99396 PREV VISIT EST AGE 40-64: CPT | Performed by: FAMILY MEDICINE

## 2022-06-30 PROCEDURE — 36415 COLL VENOUS BLD VENIPUNCTURE: CPT | Performed by: FAMILY MEDICINE

## 2022-06-30 RX ORDER — ALENDRONATE SODIUM 35 MG/1
35 TABLET ORAL
Qty: 12 TABLET | Refills: 3 | Status: SHIPPED | OUTPATIENT
Start: 2022-06-30 | End: 2023-09-07

## 2022-06-30 RX ORDER — HYDROXYZINE HYDROCHLORIDE 10 MG/1
TABLET, FILM COATED ORAL
Qty: 90 TABLET | Refills: 3 | Status: SHIPPED | OUTPATIENT
Start: 2022-06-30

## 2022-06-30 RX ORDER — LOSARTAN POTASSIUM 100 MG/1
100 TABLET ORAL DAILY
Qty: 90 TABLET | Refills: 3 | Status: SHIPPED | OUTPATIENT
Start: 2022-06-30 | End: 2023-09-01

## 2022-06-30 SDOH — ECONOMIC STABILITY: TRANSPORTATION INSECURITY
IN THE PAST 12 MONTHS, HAS LACK OF TRANSPORTATION KEPT YOU FROM MEETINGS, WORK, OR FROM GETTING THINGS NEEDED FOR DAILY LIVING?: NO

## 2022-06-30 SDOH — ECONOMIC STABILITY: FOOD INSECURITY: WITHIN THE PAST 12 MONTHS, YOU WORRIED THAT YOUR FOOD WOULD RUN OUT BEFORE YOU GOT MONEY TO BUY MORE.: NEVER TRUE

## 2022-06-30 SDOH — ECONOMIC STABILITY: TRANSPORTATION INSECURITY
IN THE PAST 12 MONTHS, HAS THE LACK OF TRANSPORTATION KEPT YOU FROM MEDICAL APPOINTMENTS OR FROM GETTING MEDICATIONS?: NO

## 2022-06-30 SDOH — ECONOMIC STABILITY: FOOD INSECURITY: WITHIN THE PAST 12 MONTHS, THE FOOD YOU BOUGHT JUST DIDN'T LAST AND YOU DIDN'T HAVE MONEY TO GET MORE.: NEVER TRUE

## 2022-06-30 SDOH — HEALTH STABILITY: PHYSICAL HEALTH: ON AVERAGE, HOW MANY MINUTES DO YOU ENGAGE IN EXERCISE AT THIS LEVEL?: 0 MIN

## 2022-06-30 SDOH — HEALTH STABILITY: PHYSICAL HEALTH: ON AVERAGE, HOW MANY DAYS PER WEEK DO YOU ENGAGE IN MODERATE TO STRENUOUS EXERCISE (LIKE A BRISK WALK)?: 0 DAYS

## 2022-06-30 ASSESSMENT — ENCOUNTER SYMPTOMS
DIARRHEA: 0
CONSTIPATION: 0
PALPITATIONS: 0
BREAST MASS: 0
HEMATOCHEZIA: 0
MYALGIAS: 0
SHORTNESS OF BREATH: 0
HEADACHES: 0
NERVOUS/ANXIOUS: 1
CHILLS: 0
EYE PAIN: 0
WEAKNESS: 0
SORE THROAT: 0
JOINT SWELLING: 0
HEARTBURN: 0
HEMATURIA: 0
COUGH: 0
DIZZINESS: 0
ARTHRALGIAS: 0
PARESTHESIAS: 0
FREQUENCY: 0
DYSURIA: 0
ABDOMINAL PAIN: 0
FEVER: 0
NAUSEA: 0

## 2022-06-30 ASSESSMENT — SOCIAL DETERMINANTS OF HEALTH (SDOH): HOW HARD IS IT FOR YOU TO PAY FOR THE VERY BASICS LIKE FOOD, HOUSING, MEDICAL CARE, AND HEATING?: NOT HARD AT ALL

## 2022-06-30 ASSESSMENT — PAIN SCALES - GENERAL: PAINLEVEL: NO PAIN (0)
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
## 2022-06-30 NOTE — COMMUNITY RESOURCES LIST (ENGLISH)
06/30/2022   [facility] - Outpatient Clinics  Galindo Esparza  For questions about this resource list or additional care needs, please contact your primary care clinic or care manager.  Phone: [phone]   Email: N/A   Address: [address] [place] [address]   Hours: N/A        Exercise and Recreation       Gym or workout facility  1  [facility] [facility] Distance: 2.18 miles      COVID-19 Status: Regular Operations   [address] [place] [address]  Language: English  Hours: Mon - Fri 5:00 AM - 8:00 PM , Sat 8:00 AM - 5:00 PM  Fees: Self Pay, Sliding Fee   Phone: (230) 167-3126 Website: https://[email or web address]/locations/[place]?utm_source=[facility]&utm_medium=local&utm_campaign=local%20search     2  [employer] [facility] Distance: 3.61 miles      COVID-19 Status: Regular Operations   [address] [place] [address]  Language: English  Hours: Mon - Sun Open 24 Hours  Fees: Self Pay   Phone: (606) 267-2228 Email: marvin@[employer] Website: [email or web address]/[email or web address]-[email or web address]/          Hotlines and Helplines       Hotline - Crisis help  3  [facility] [facility] - 24-Hour Helpline Distance: 12.43 miles      COVID-19 Status: Regular Operations   [address] [place] [address]  Language: Divehi, English, Hmong, Kittitian, Romanian  Hours: Mon - Sun Open 24 Hours   Phone: (634) [phone] Email: tnxpfnnv8d@[employer] Website: http://[employer]     4  [employer] [facility] Distance: 12.72 miles      COVID-19 Status: Phone/Virtual   [address] [facility] [address] [place] [address]  Language: English  Hours: Mon - Sun Open 24 Hours   Phone: (206) 181-5684 Website: [email or web address]/location/[email or web address]/          Mental Health       Individual counseling  5  [facility] Distance: 3.54 miles      COVID-19 Status: Regular Operations   [address] [place] [facility] [place] [place] [address]  Language: English  Hours: Mon - Tue 8:00 AM - 6:00 PM , Wed - Thu 7:00 AM - 5:00 PM , Fri 8:00 AM - 4:30 PM  Fees: Insurance, Self Pay   Phone: (813) 720-1466 Email: [employer] Website: https://account.[employer]ur/locations/[email or web address]     6  [facility] Distance: 4.22 miles      COVID-19 Status: Regular Operations, COVID-19 Status: Phone/Virtual   [address] [place] [address]  Language: English  Hours: Mon - Thu 8:00 AM - 9:00 PM  Fees: Insurance, Self Pay, Sliding Fee   Phone: (480) [phone] Email: info@[email or web address] Website: [email or web address]     Mental health crisis care  7  [employer] [facility] Distance: 12.72 miles      COVID-19 Status: Regular Operations, COVID-19 Status: Phone/Virtual   [address] [facility] [address] [place] [address]  Language: English  Hours: Mon - Sun Open 24 Hours  Fees: Insurance, Self Pay   Phone: ([phone]) 488-1941 Website: [email or web address]     8  [facility] Distance: 19.58 miles      COVID-19 Status: Regular Operations, COVID-19 Status: Phone/Virtual   [address] [facility] [address] [place] [address]  Language: English, Sudanese  Hours: Mon - Fri 9:00 AM - 5:00 PM  Fees: Insurance, Self Pay   Phone: (350) 557-2975 Website: http://[employer]/index.php     Mental health support group  9  [facility] Distance: 13.82 miles      COVID-19 Status: Phone/Virtual   [address] [place] [address]  Language: English  Hours: Mon - Fri 9:00 AM - 3:00 PM  Fees: Free   Phone: (383) 635-6100 Website: http://www.[facility].[email or web address]/     10  [facility]  [place] - Caregiver Support Groups Distance: 16.03 miles      COVID-19 Status: Service Unavailable   [address] [facility] [place] [address]  Language: English  Hours: Wed 1:00  PM - 3:00 PM  Fees: Insurance, Self Pay   Phone: (869) 875-9374 Email: hafsa@[employer] Website: https://www.LOFTY.org/          Important Numbers & Websites       Emergency Services   911  [facility] Services   311  Poison Control   (732) 714-4728  Suicide Prevention Lifeline   (251) 872-4076 (TALK)  Child Abuse Hotline   (239) 594-9934 (4-A-Child)  Sexual Assault Hotline   (354) 242-4695 (HOPE)  National Runaway Safeline   (155) 578-3183 (RUNAWAY)  All-Options Talkline   (742) 958-1456  Substance Abuse Referral   (416) 240-9885 (HELP)

## 2022-06-30 NOTE — COMMUNITY RESOURCES LIST (ENGLISH)
06/30/2022   [facility] - Outpatient Clinics  Galindo Fraede  For questions about this resource list or additional care needs, please contact your primary care clinic or care manager.  Phone: [phone]   Email: N/A   Address: [address] [place] [address]   Hours: N/A        Hotlines and Helplines       Hotline - Crisis help  1  Vict[employer]. - 24-Hour Helpline Distance: 12.43 miles      COVID-19 Status: Regular Operations   [address] [place] [address]  Language: Nepali, English, Hmong, Ukrainian, Turkish  Hours: Mon - Sun Open 24 Hours   Phone: (523) [phone] Email: [email or web address]@Appifier Website: http://Appifier     2  [facility] Distance: 12.72 miles      COVID-19 Status: Phone/Virtual   [address] [facility] [address] [place] [address]  Language: English  Hours: Mon - Sun Open 24 Hours   Phone: (322) 604-8399 Website: https://www.SolePower.Solavei/location/Electronifie/          Mental Health       Individual counseling  3  [facility] Distance: 3.54 miles      COVID-19 Status: Regular Operations   [address] [place] [address]  Language: English  Hours: Mon - Tue 8:00 AM - 6:00 PM , Wed - Thu 7:00 AM - 5:00 PM , Fri 8:00 AM - 4:30 PM  Fees: Insurance, Self Pay   Phone: (701) 769-1132 Email: info@Web Wonks Website: https://account.UrbanFarmers.org/locations/66     4  [facility] Distance: 4.22 miles      COVID-19 Status: Regular Operations, COVID-19 Status: Phone/Virtual   [address] [place] [address]  Language: English  Hours: Mon - Thu 8:00 AM - 9:00 PM  Fees: Insurance, Self Pay, Sliding Fee   Phone: (158) [phone] Email: info@Gravie Website: https://Streetline.Tweegee/location/[place]/     Mental health crisis care  5  [facility] Mobile Crisis Services Distance: 12.72 miles      COVID-19 Status: Regular Operations,  COVID-19 Status: Phone/Virtual   [address] [facility] [address] [place] [address]  Language: English  Hours: Mon - Sun Open 24 Hours  Fees: Insurance, Self Pay   Phone: (412) [phone] Website: https://www.SDI-Solution.org/location/pablo/     6  [facility] Distance: 19.58 miles      COVID-19 Status: Regular Operations, COVID-19 Status: Phone/Virtual   [address] [facility] [address] [place] [address]  Language: English, Congolese  Hours: Mon - Fri 9:00 AM - 5:00 PM  Fees: Insurance, Self Pay   Phone: (246) 427-7145 Website: http://RealMatch/index.php     Mental health support group  7  Adalberto Hogan [place] [facility] - [facility] [place] - [place] Drop-In [place] Distance: 13.82 miles      COVID-19 Status: Phone/Virtual   [address] [place] [address]  Language: English  Hours: Mon - Fri 9:00 AM - 3:00 PM  Fees: Free   Phone: (810) 441-3517 Website: http://www.CinexioBethesda North HospitalFL3XXer.org/     8  [facility] [place] - Caregiver Support Groups Distance: 16.03 miles      COVID-19 Status: Service Unavailable   [address] [place] [address]  Language: English  Hours: Wed 1:00 PM - 3:00 PM  Fees: Insurance, Self Pay   Phone: (573) 690-8668 Email: familymeans@familyBest Learning English.org Website: https://www.familymeans.org/          Important Numbers & Websites       Emergency Services   911  [facility] Services   311  Poison Control   (154) 225-1593  Suicide Prevention Lifeline   (984) 345-4644 (TALK)  Child Abuse Hotline   (457) 374-8304 (4-A-Child)  Sexual Assault Hotline   (208) 743-7944 (HOPE)  National Runaway Safeline   (262) 557-3375 (RUNAWAY)  All-Options Talkline   (883) 488-9951  Substance Abuse Referral   (832) 762-8253 (HELP)

## 2022-06-30 NOTE — COMMUNITY RESOURCES LIST (ENGLISH)
06/30/2022   [facility] - Outpatient Clinics  Galindo Fraede  For questions about this resource list or additional care needs, please contact your primary care clinic or care manager.  Phone: [phone]   Email: N/A   Address: [address] [place] [address]   Hours: N/A        Hotlines and Helplines       Hotline - Crisis help  1  [place]. - 24-Hour Helpline Distance: 12.43 miles      COVID-19 Status: Regular Operations   [address] [place] [address]  Language: German, English, Hmong, French, Northern Irish  Hours: Mon - Sun Open 24 Hours   Phone: (588) 375-6334 Email: [email or web address]@World First Website: http://World First     2  [employer] Distance: 12.72 miles      COVID-19 Status: Phone/Virtual   [address] [facility] [address] [place] [address]  Language: English  Hours: Mon - Sun Open 24 Hours   Phone: (216) 801-7164 Website: https://www.Aptela.MediaInterface [place]/location/GLOBALGROUP INVESTMENT HOLDINGS/          Mental Health       Individual counseling  3  [facility] Distance: 3.54 miles      COVID-19 Status: Regular Operations   [address] [place] [address]  Language: English  Hours: Mon - Tue 8:00 AM - 6:00 PM , Wed - Thu 7:00 AM - 5:00 PM , Fri 8:00 AM - 4:30 PM  Fees: Insurance, Self Pay   Phone: (231) [phone] Email: info@SOLARBRUSH Website: https://account.The Green Life Guides.org/locations/66     4  [facility] Distance: 4.22 miles      COVID-19 Status: Regular Operations, COVID-19 Status: Phone/Virtual   [address] [place] [address]  Language: English  Hours: Mon - Thu 8:00 AM - 9:00 PM  Fees: Insurance, Self Pay, Sliding Fee   Phone: (161) [phone] Email: info@Traak Ltda. Website: https://RyMed Technologies.Synchroneuron/location/[place]/     Mental health crisis care  5  [facility] Mobile Crisis Services Distance: 12.72 miles      COVID-19 Status: Regular Operations,  COVID-19 Status: Phone/Virtual   [address] [facility] [address] [place] [address]  Language: English  Hours: Mon - Sun Open 24 Hours  Fees: Insurance, Self Pay   Phone: (782) 815-5442 Website: https://www.Zeltiq Aesthetics.org/location/pablo/     6  [facility] Distance: 19.58 miles      COVID-19 Status: Regular Operations, COVID-19 Status: Phone/Virtual   [address] [facility] [address] [place] [address]  Language: English, Filipino  Hours: Mon - Fri 9:00 AM - 5:00 PM  Fees: Insurance, Self Pay   Phone: (273) 397-7529 Website: http://SYLOB/index.php     Mental health support group  7  Adalberto Hogna [place] [facility]-Being - [facility] [place] [facility] [place] Drop-In [place] Distance: 13.82 miles      COVID-19 Status: Phone/Virtual   [address] [place] [address]  Language: English  Hours: Mon - Fri 9:00 AM - 3:00 PM  Fees: Free   Phone: (939) 730-4457 Website: http://www.New Choices EntertainmentAccess Hospital DaytonLocalOner.org/     8  [facility] [place] - Caregiver Support Groups Distance: 16.03 miles      COVID-19 Status: Service Unavailable   [address] [place] [address]  Language: English  Hours: Wed 1:00 PM - 3:00 PM  Fees: Insurance, Self Pay   Phone: (221) 193-6072 Email: familymeans@familyInterventional Spine.org Website: https://www.familymeans.org/          Important Numbers & Websites       Emergency Services   911  [facility] Services   311  Poison Control   (761) 754-7455  Suicide Prevention Lifeline   (625) 346-8337 (TALK)  Child Abuse Hotline   (967) 272-1651 (4-A-Child)  Sexual Assault Hotline   (832) 278-2689 (HOPE)  National Runaway Safeline   (993) 390-5066 (RUNAWAY)  All-Options Talkline   (367) 430-5791  Substance Abuse Referral   (973) 550-3752 (HELP)

## 2022-06-30 NOTE — COMMUNITY RESOURCES LIST (ENGLISH)
06/30/2022   [facility] - Outpatient Clinics  Galindo Fraede  For questions about this resource list or additional care needs, please contact your primary care clinic or care manager.  Phone: [phone]   Email: N/A   Address: [address] [place] [address]   Hours: N/A        Hotlines and Helplines       Hotline - Crisis help  1  [employer]. - 24-Hour Helpline Distance: 12.43 miles      COVID-19 Status: Regular Operations   [address] [place] [address]  Language: Turkish, English, Hmong, Iraqi, Omani  Hours: Mon - Sun Open 24 Hours   Phone: (103) [phone] Email: [email or web address] Website: http://mydeco     2  [employer] Distance: 12.72 miles      COVID-19 Status: Phone/Virtual   [address] [facility] [address] [place] [address]  Language: English  Hours: Mon - Sun Open 24 Hours   Phone: (285) 880-8648 Website: https://www.Emergent Views.MobileApps.com/location/CapLinked/          Mental Health       Individual counseling  3  [facility] Distance: 3.54 miles      COVID-19 Status: Regular Operations   [address] [place] [address]  Language: English  Hours: Mon - Tue 8:00 AM - 6:00 PM , Wed - Thu 7:00 AM - 5:00 PM , Fri 8:00 AM - 4:30 PM  Fees: Insurance, Self Pay   Phone: (442) 354-6535 Email: info@EmbedStore Website: https://account.Xageek.org/locations/66     4  [facility] Distance: 4.22 miles      COVID-19 Status: Regular Operations, COVID-19 Status: Phone/Virtual   [address] [place] [address]  Language: English  Hours: Mon - Thu 8:00 AM - 9:00 PM  Fees: Insurance, Self Pay, Sliding Fee   Phone: (230) 620-4207 Email: info@Oxley's Extra Website: https://Gruvie.CamSemi/location/[place]/     Mental health crisis care  5  [facility] Mobile Crisis Services Distance: 12.72 miles      COVID-19 Status: Regular Operations,  COVID-19 Status: Phone/Virtual   [address] [facility] [address] [place] [address]  Language: English  Hours: Mon - Sun Open 24 Hours  Fees: Insurance, Self Pay   Phone: (185) 888-2797 Website: https://www.MedAvail.org/location/pablo/     6  [facility] Distance: 19.58 miles      COVID-19 Status: Regular Operations, COVID-19 Status: Phone/Virtual   [address] [facility] [address] [place] [address]  Language: English, Bermudian  Hours: Mon - Fri 9:00 AM - 5:00 PM  Fees: Insurance, Self Pay   Phone: (649) 974-5201 Website: http://Linquet/index.php     Mental health support group  7  Adalberto Hogan [place] for Mental [facility] & Well-Being - [facility] [place] - [place] Drop-In [place] Distance: 13.82 miles      COVID-19 Status: Phone/Virtual   [address] [place] [address]  Language: English  Hours: Mon - Fri 9:00 AM - 3:00 PM  Fees: Free   Phone: (329) 370-9557 Website: http://www.Aridhia InformaticsMedina HospitalSoicoser.org/     8  [facility] [place] - Caregiver Support Groups Distance: 16.03 miles      COVID-19 Status: Service Unavailable   [address] [place] [address]  Language: English  Hours: Wed 1:00 PM - 3:00 PM  Fees: Insurance, Self Pay   Phone: (889) 228-1295 Email: familymeans@familyInfochimps.org Website: https://www.familymeans.org/          Important Numbers & Websites       Emergency Services   911  [facility] Services   311  Poison Control   (100) 836-6209  Suicide Prevention Lifeline   (276) 258-7195 (TALK)  Child Abuse Hotline   (149) 604-8846 (4-A-Child)  Sexual Assault Hotline   (826) 769-5320 (HOPE)  National Runaway Safeline   (396) 377-7128 (RUNAWAY)  All-Options Talkline   (358) 219-8327  Substance Abuse Referral   (316) 251-8568 (HELP)

## 2022-06-30 NOTE — COMMUNITY RESOURCES LIST (ENGLISH)
06/30/2022   [facility] - Outpatient Clinics  Galindo Fraede  For questions about this resource list or additional care needs, please contact your primary care clinic or care manager.  Phone: [phone]   Email: N/A   Address: [address] [place] [address]   Hours: N/A        Hotlines and Helplines       Hotline - Crisis help  1  Just [employer]. - 24-Hour Helpline Distance: 12.43 miles      COVID-19 Status: Regular Operations   [address] [place] [address]  Language: Kinyarwanda, English, Hmong, Niuean, Haitian  Hours: Mon - Sun Open 24 Hours   Phone: (315) 597-5745 Email: bbllddxo8s@ivWatch Website: http://ivWatch     2  [employer] Distance: 12.72 miles      COVID-19 Status: Phone/Virtual   [address] [facility] [address] [place] [address]  Language: English  Hours: Mon - Sun Open 24 Hours   Phone: (931) 777-1774 Website: https://www.Medxnote.J. Hilburn/location/[place] (formerly Zypsee)/          Mental Health       Individual counseling  3  [facility] Distance: 3.54 miles      COVID-19 Status: Regular Operations   [address] [place] [address]  Language: English  Hours: Mon - Tue 8:00 AM - 6:00 PM , Wed - Thu 7:00 AM - 5:00 PM , Fri 8:00 AM - 4:30 PM  Fees: Insurance, Self Pay   Phone: (768) 791-6345 Email: info@ZeroWire Inc Website: https://account.Glowbl.org/locations/66     4  [facility] Distance: 4.22 miles      COVID-19 Status: Regular Operations, COVID-19 Status: Phone/Virtual   [address] [place] [address]  Language: English  Hours: Mon - Thu 8:00 AM - 9:00 PM  Fees: Insurance, Self Pay, Sliding Fee   Phone: (322) [phone] Email: info@musiXmatch Website: https://Pitadela.Surface Tension/location/[place]/     Mental health crisis care  5  [facility] Distance: 12.72 miles      COVID-19 Status: Regular Operations,  COVID-19 Status: Phone/Virtual   [address] [facility] [address] [place] [address]  Language: English  Hours: Mon - Sun Open 24 Hours  Fees: Insurance, Self Pay   Phone: (603) [phone] Website: https://www.Climateminder.org/location/pablo/     6  [facility] Behavioral Health Distance: 19.58 miles      COVID-19 Status: Regular Operations, COVID-19 Status: Phone/Virtual   [address] [facility] [address] [place] [address]  Language: English, Syrian  Hours: Mon - Fri 9:00 AM - 5:00 PM  Fees: Insurance, Self Pay   Phone: (744) 153-1256 Website: http://BioMotiv/index.php     Mental health support group  7  Adalberto Hogan [place] for Mental Health & Well-Being - [facility] [place] - [place] Drop-In [place] Distance: 13.82 miles      COVID-19 Status: Phone/Virtual   [address] [place] [address]  Language: English  Hours: Mon - Fri 9:00 AM - 3:00 PM  Fees: Free   Phone: (660) 682-9217 Website: http://www.PowerFileC[facility] Clinic [facility]ucl[facility] HospitalGoal Zeroer.org/     8  [facility] [place] - Caregiver Support Groups Distance: 16.03 miles      COVID-19 Status: Service Unavailable   [address] [place] [address]  Language: English  Hours: Wed 1:00 PM - 3:00 PM  Fees: Insurance, Self Pay   Phone: (251) 814-6421 Email: familymeans@familyGIS Cloud.org Website: https://www.familymeans.org/          Important Numbers & Websites       Emergency Services   911  [facility] Services   311  Poison Control   (291) 569-6587  Suicide Prevention Lifeline   (173) 325-4462 (TALK)  Child Abuse Hotline   (912) 893-3764 (4-A-Child)  Sexual Assault Hotline   (729) 717-7405 (HOPE)  National Runaway Safeline   (553) 972-1286 (RUNAWAY)  All-Options Talkline   (296) 989-8469  Substance Abuse Referral   (764) 509-2028 (HELP)

## 2022-10-09 ENCOUNTER — HEALTH MAINTENANCE LETTER (OUTPATIENT)
Age: 57
End: 2022-10-09

## 2022-11-11 DIAGNOSIS — E78.2 MIXED HYPERLIPIDEMIA: ICD-10-CM

## 2022-11-11 RX ORDER — ATORVASTATIN CALCIUM 10 MG/1
10 TABLET, FILM COATED ORAL DAILY
Qty: 90 TABLET | Refills: 0 | Status: SHIPPED | OUTPATIENT
Start: 2022-11-11 | End: 2023-02-09

## 2023-02-08 DIAGNOSIS — E78.2 MIXED HYPERLIPIDEMIA: ICD-10-CM

## 2023-02-09 RX ORDER — ATORVASTATIN CALCIUM 10 MG/1
10 TABLET, FILM COATED ORAL DAILY
Qty: 90 TABLET | Refills: 1 | Status: SHIPPED | OUTPATIENT
Start: 2023-02-09 | End: 2023-09-07

## 2023-02-09 NOTE — TELEPHONE ENCOUNTER
Prescription approved per [facility] Refill Protocol.  Mimi Corbett RN on 2/9/2023 at 12:26 PM

## 2023-03-25 ENCOUNTER — HEALTH MAINTENANCE LETTER (OUTPATIENT)
Age: 58
End: 2023-03-25

## 2023-03-30 ENCOUNTER — OFFICE VISIT (OUTPATIENT)
Dept: URGENT CARE | Facility: URGENT CARE | Age: 58
End: 2023-03-30
Payer: COMMERCIAL

## 2023-03-30 ENCOUNTER — ANCILLARY PROCEDURE (OUTPATIENT)
Dept: GENERAL RADIOLOGY | Facility: CLINIC | Age: 58
End: 2023-03-30
Attending: PHYSICIAN ASSISTANT
Payer: COMMERCIAL

## 2023-03-30 VITALS
WEIGHT: 135 LBS | OXYGEN SATURATION: 98 % | HEART RATE: 107 BPM | SYSTOLIC BLOOD PRESSURE: 160 MMHG | BODY MASS INDEX: 21.79 KG/M2 | TEMPERATURE: 98.3 F | DIASTOLIC BLOOD PRESSURE: 105 MMHG

## 2023-03-30 DIAGNOSIS — S82.201A CLOSED FRACTURE OF RIGHT TIBIA AND FIBULA, INITIAL ENCOUNTER: Primary | ICD-10-CM

## 2023-03-30 DIAGNOSIS — S99.911A INJURY OF RIGHT ANKLE, INITIAL ENCOUNTER: ICD-10-CM

## 2023-03-30 DIAGNOSIS — Z72.0 TOBACCO ABUSE DISORDER: ICD-10-CM

## 2023-03-30 DIAGNOSIS — S82.401A CLOSED FRACTURE OF RIGHT TIBIA AND FIBULA, INITIAL ENCOUNTER: Primary | ICD-10-CM

## 2023-03-30 PROCEDURE — 73610 X-RAY EXAM OF ANKLE: CPT | Mod: TC | Performed by: RADIOLOGY

## 2023-03-30 PROCEDURE — 99214 OFFICE O/P EST MOD 30 MIN: CPT | Performed by: PHYSICIAN ASSISTANT

## 2023-03-30 NOTE — PROGRESS NOTES
Chief Complaint   Patient presents with     Ankle Pain     Right; fell last night       ASSESSMENT/PLAN:  Lorna was seen today for ankle pain.    Diagnoses and all orders for this visit:    Closed fracture of right tibia and fibula, initial encounter  -     Orthopedic  Referral; Future  -     Ankle/Foot Bracing Supplies DME Walking Boot; Right; Non-pneumatic; Tall    Tobacco abuse disorder    Injury of right ankle, initial encounter  -     Cancel: XR Ankle Left G/E 3 Views; Future  -     Cancel: XR Ankle Right G/E 3 Views; Future    Tobacco abuse makes her a high risk for poor healing.  Patient with 2 fractures noted on x-ray over read.  Placed in a walking boot and offered crutches.  Follow-up with Ortho in 1 week for definitive fracture management  Tylenol, ibuprofen as needed for pain, elevation    Abelino Franks PA-C      SUBJECTIVE:  Lorna is a 57 year old female with osteopenia, tobacco abuse among other things comorbidities who presents to urgent care with 1 day of an ankle injury.  She was walking yesterday and believes she rolled it.  She felt things shift.  Has been been painful, swollen and has difficulty weightbearing since then.    ROS: Pertinent ROS neg other than the symptoms noted above in the HPI.     OBJECTIVE:  BP (!) 160/105   Pulse 107   Temp 98.3  F (36.8  C)   Wt 61.2 kg (135 lb)   SpO2 98%   BMI 21.79 kg/m     GENERAL: healthy, alert and no distress  MS: Swelling of the right ankle with decreased range of motion, tenderness of the lateral malleolus and tib-fib joint line.  No midfoot tenderness, fifth metatarsal tenderness, heel tenderness.  Tib-fib squeeze negative.  No ipsilateral knee tenderness  SKIN: no suspicious lesions or rashes  NEURO: Normal strength and tone, mentation intact and speech normal, neurovascularly intact distal to injury    DIAGNOSTICS  Xray - Reviewed and interpreted by me.  Distal fibula fracture noted  Results for orders placed or performed in visit on  03/30/23   XR Ankle Right G/E 3 Views     Status: None    Narrative    XR ANKLE RIGHT G/E 3 VIEWS   3/30/2023 1:04 PM     HISTORY: fall, tender at lat mal and between tib and fib; Injury of  right ankle, initial encounter. Ankle pain  COMPARISON: None.       Impression    IMPRESSION: There is an acute, minimally displaced fracture of the  distal fibula with extension to the ankle mortise. There is also a  minimally displaced fracture of the posterior malleolus. There is  overlying soft tissue swelling.    MOISÉS BIRD MD         SYSTEM ID:  [id number]        Current Outpatient Medications   Medication     alendronate (FOSAMAX) 35 MG tablet     atorvastatin (LIPITOR) 10 MG tablet     hydrOXYzine (ATARAX) 10 MG tablet     losartan (COZAAR) 100 MG tablet     sertraline (ZOLOFT) 50 MG tablet     No current facility-administered medications for this visit.      Patient Active Problem List   Diagnosis     Tobacco abuse disorder     CARDIOVASCULAR SCREENING; LDL GOAL LESS THAN 160     TITA (generalized anxiety disorder)     Benign essential hypertension     Hypertension goal BP (blood pressure) < 140/90     Dyshydrosis     ASCUS with positive high risk HPV cervical     Family history of colon cancer      Past Medical History:   Diagnosis Date     Abnormal Pap smear of cervix 01/22/2020    see problem list     Benign essential hypertension 05/18/2017     GENERALIZED ANXIETY DIS 05/15/2007     Hyperlipidemia with target LDL less than 130 11/06/2013     Hypertension goal BP (blood pressure) < 140/90 05/18/2017     Major depressive disorder, recurrent episode, mild (H) 12/09/2015     Migraine, unspecified, without mention of intractable migraine without mention of status migrainosus      Moderate Depression [296.32] 08/19/2009     Premenstrual tension syndromes     PMDS on Sarafem and doing very well.     Tobacco abuse 11/06/2013     Past Surgical History:   Procedure Laterality Date     COLONOSCOPY N/A 2/9/2015     Procedure: COLONOSCOPY;  Surgeon: Ritesh Chiu MD;  Location:  GI     [facility] HYSTEROSCOPY, SURGICAL; W/ ENDOMETRIAL ABLATION, ANY METHOD  09     [facility] ANESTH,NOSE,SINUS SURGERY       [facility] RESEC HEAD OF DALJITANX,TOE  2003     Family History   Problem Relation Age of Onset     Myocardial Infarction Father 40         at age 40     Hypertension Father          at 40.  Heart Attack     Lipids Father      C.A.D. Father          at age 40 from an MI     Arthritis Mother      Eye Disorder Mother         glaucoma     Genitourinary Problems Mother         hysterectomy     Hypertension Mother      Colon Cancer Mother 80     Liver Cancer Mother 80     Hyperlipidemia Mother      Cardiovascular Maternal Grandmother      Eye Disorder Maternal Grandmother         cataracts     Heart Disease Maternal Grandmother      Hypertension Maternal Grandmother      Cardiovascular Maternal Grandfather      Hypertension Maternal Grandfather      Cardiovascular Paternal Grandmother      Hypertension Paternal Grandmother      Cardiovascular Paternal Grandfather      Hypertension Paternal Grandfather      Musculoskeletal Disorder Paternal Uncle         MS     Heart Failure Maternal Aunt 85     Social History     Tobacco Use     Smoking status: Former     Packs/day: 0.00     Years: 7.00     Pack years: 0.00     Types: Cigarettes     Start date: 2010     Quit date: 2021     Years since quittin.5     Smokeless tobacco: Never     Tobacco comments:     I QUIT!!   Substance Use Topics     Alcohol use: Yes     Alcohol/week: 7.0 standard drinks     Types: 7 Glasses of wine per week     Comment: daily 1-2 wine              The plan of care was discussed with the patient. They understand and agree with the course of treatment prescribed. A printed summary was given including instructions and medications.  The use of Dragon/Agito Networks dictation services may have been used to construct the content in this note; any grammatical or  spelling errors are non-intentional. Please contact the author of this note directly if you are in need of any clarification.

## 2023-03-31 ENCOUNTER — NURSE TRIAGE (OUTPATIENT)
Dept: FAMILY MEDICINE | Facility: CLINIC | Age: 58
End: 2023-03-31
Payer: COMMERCIAL

## 2023-03-31 NOTE — TELEPHONE ENCOUNTER
S: Ankle Pain/Fracture    B: Patient is calling in regarding ankle pain. Patient states she was helping her mother move and rolled her ankle. She did go in to the Urgent Care in [place]. She has a fracture tib/fib. She has to have a follow up with an Ortho provider in 7 days.  She said ibuprofen is not cutting it for pain. She is wanting to know if Dr. Esparza would prescribe her something a little stronger for pain. She said she has been taking 800mg ibuprofen every 6 hours. Denies n/t. Denies pain is worse, just not better. She does have boot on foot, denies foot is cold to touch.     A: Advised patient that provider was not in office today, would be back in on Monday. No guarantee he will see it today.  RN reviewed red flag symptoms with patient and when to seek emergency care.     R: Patient verbalized understanding and is agreeable to still have message sent to provider. States if pain gets worse before then she will go back to UC or to ED. She would like prescription sent to [facility] in [place] on [facility] If he is willing to send something in for her. Again she states she will go to UC/ED before she hears back if pain worsens or she cannot wait. We did also discus alternating Tylenol with ibuprofen if she is able to use it. RN did offer to send message to covering provider, but patient declined as she said  Dr. Esparza knows her better and would know what she needed/should take.

## 2023-04-03 NOTE — TELEPHONE ENCOUNTER
Aleyda, this should have been sent to another provider that day since I am not in the office on Fridays.  I should never have sat over the weekend.  I did talk to the patient and she had been having a lot of pain over the weekend but it is better now with just the ibuprofen.  In the future, I would just run this by one of the providers who is in clinic.  Thanks,    Electronically signed by:  Galindo Esparza M.D.  4/3/2023

## 2023-04-05 ENCOUNTER — TELEPHONE (OUTPATIENT)
Dept: FAMILY MEDICINE | Facility: CLINIC | Age: 58
End: 2023-04-05
Payer: COMMERCIAL

## 2023-04-05 DIAGNOSIS — G47.09 OTHER INSOMNIA: ICD-10-CM

## 2023-04-05 DIAGNOSIS — F41.0 ANXIETY ATTACK: ICD-10-CM

## 2023-04-05 DIAGNOSIS — I10 BENIGN ESSENTIAL HYPERTENSION: ICD-10-CM

## 2023-04-05 DIAGNOSIS — S82.201D CLOSED FRACTURE OF RIGHT TIBIA AND FIBULA WITH ROUTINE HEALING, SUBSEQUENT ENCOUNTER: Primary | ICD-10-CM

## 2023-04-05 DIAGNOSIS — S82.401D CLOSED FRACTURE OF RIGHT TIBIA AND FIBULA WITH ROUTINE HEALING, SUBSEQUENT ENCOUNTER: Primary | ICD-10-CM

## 2023-04-05 RX ORDER — OXYCODONE HYDROCHLORIDE 5 MG/1
5 TABLET ORAL EVERY 6 HOURS PRN
Qty: 12 TABLET | Refills: 0 | Status: SHIPPED | OUTPATIENT
Start: 2023-04-05 | End: 2023-04-10

## 2023-04-05 NOTE — TELEPHONE ENCOUNTER
Rx sent to the pharmacy for her.  She should continue to take ibuprofen 800 mg every 6 hours and can alternate tylenol 1000 mg every 3 hour later so she is getting each one very 6 hours but getting something every 3 hours.  She can then supplement for pain with the oxycodone as needed.      Electronically signed by:  Galindo Esparza M.D.  4/5/2023

## 2023-04-05 NOTE — TELEPHONE ENCOUNTER
Patient is having quite a bit of pain and is wondering if you can send some medication for her.  She thought the ibuprofen would be sufficient but that is NOT the case anymore.    Patient is requesting pain medication be sent to pharmacy at [facility] in [place]    Patient would like a phone call when something is called in:  [phone]    Viviane MARTINEZO/

## 2023-04-10 ENCOUNTER — OFFICE VISIT (OUTPATIENT)
Dept: PODIATRY | Facility: CLINIC | Age: 58
End: 2023-04-10
Attending: PHYSICIAN ASSISTANT
Payer: COMMERCIAL

## 2023-04-10 ENCOUNTER — TELEPHONE (OUTPATIENT)
Dept: PODIATRY | Facility: CLINIC | Age: 58
End: 2023-04-10

## 2023-04-10 VITALS
BODY MASS INDEX: 21.69 KG/M2 | SYSTOLIC BLOOD PRESSURE: 110 MMHG | WEIGHT: 135 LBS | HEIGHT: 66 IN | DIASTOLIC BLOOD PRESSURE: 72 MMHG

## 2023-04-10 DIAGNOSIS — S82.401A CLOSED FRACTURE OF RIGHT TIBIA AND FIBULA, INITIAL ENCOUNTER: ICD-10-CM

## 2023-04-10 DIAGNOSIS — S82.201A CLOSED FRACTURE OF RIGHT TIBIA AND FIBULA, INITIAL ENCOUNTER: ICD-10-CM

## 2023-04-10 PROCEDURE — 99203 OFFICE O/P NEW LOW 30 MIN: CPT | Performed by: PODIATRIST

## 2023-04-10 RX ORDER — SERTRALINE HYDROCHLORIDE 100 MG/1
1 TABLET, FILM COATED ORAL DAILY
COMMUNITY
Start: 2021-10-14 | End: 2023-09-07

## 2023-04-10 RX ORDER — HYDROXYZINE HYDROCHLORIDE 10 MG/1
1 TABLET, FILM COATED ORAL
COMMUNITY
Start: 2022-06-30 | End: 2023-04-26

## 2023-04-10 RX ORDER — OXYCODONE HYDROCHLORIDE 5 MG/1
5 TABLET ORAL EVERY 6 HOURS PRN
Qty: 20 TABLET | Refills: 0 | Status: SHIPPED | OUTPATIENT
Start: 2023-04-10 | End: 2023-06-01

## 2023-04-10 ASSESSMENT — PAIN SCALES - GENERAL: PAINLEVEL: WORST PAIN (10)

## 2023-04-10 NOTE — PROGRESS NOTES
"HPI:  Lorna Gorman is a 57 year old female who is seen in consultation at the request of Abelino Franks PA-C    Pt presents for eval of:   (Onset, Location, L/R, Character, Treatments, Injury if yes)    XR Right ankle 3/30/2023     Onset 3/29/2023, while walking, \"felt and heard ankle break\". Presents with lateral Right ankle and anterior Right lower leg pain, WB w/tall gray fx boot and karly friend, Juwan.  Constant, swelling, bruising, dull ache, sharp, stabbing, burning, warm, pain 2-10/10 worse with walking.    Oxycodone, ibuprofen, rest, elevation, wearing fx boot for daily activity only.    Retired .    History of osteopenia and bilateral wrist fractures after a fall on ice.    ROS:  10 point ROS neg other than the symptoms noted above in the HPI.    Patient Active Problem List   Diagnosis     Tobacco abuse disorder     CARDIOVASCULAR SCREENING; LDL GOAL LESS THAN 160     TITA (generalized anxiety disorder)     Benign essential hypertension     Hypertension goal BP (blood pressure) < 140/90     Dyshydrosis     ASCUS with positive high risk HPV cervical     Family history of colon cancer       PAST MEDICAL HISTORY:   Past Medical History:   Diagnosis Date     Abnormal Pap smear of cervix 01/22/2020    see problem list     Benign essential hypertension 05/18/2017     GENERALIZED ANXIETY DIS 05/15/2007     Hyperlipidemia with target LDL less than 130 11/06/2013     Hypertension goal BP (blood pressure) < 140/90 05/18/2017     Major depressive disorder, recurrent episode, mild (H) 12/09/2015     Migraine, unspecified, without mention of intractable migraine without mention of status migrainosus      Moderate Depression [296.32] 08/19/2009     Premenstrual tension syndromes     PMDS on Sarafem and doing very well.     Tobacco abuse 11/06/2013        PAST SURGICAL HISTORY:   Past Surgical History:   Procedure Laterality Date     COLONOSCOPY N/A 2/9/2015    Procedure: COLONOSCOPY;  Surgeon: Aram, " Ritesh NEELY MD;  Location: [facility] HYSTEROSCOPY, SURGICAL; W/ ENDOMETRIAL ABLATION, ANY METHOD  09     [facility] ANESTH,NOSE,SINUS SURGERY       [facility] RESEC HEAD OF PHALANX,TOE          MEDICATIONS:   Current Outpatient Medications:      alendronate (FOSAMAX) 35 MG tablet, Take 1 tablet (35 mg) by mouth every 7 days, Disp: 12 tablet, Rfl: 3     atorvastatin (LIPITOR) 10 MG tablet, TAKE 1 TABLET (10 MG) BY MOUTH DAILY, Disp: 90 tablet, Rfl: 1     hydrOXYzine (ATARAX) 10 MG tablet, Take 10 mg up to every 6 hours for any anxiety or panic feelings, may take 20-50 mg at bedtime for insomnia as needed, Disp: 90 tablet, Rfl: 3     losartan (COZAAR) 100 MG tablet, Take 1 tablet (100 mg) by mouth daily, Disp: 90 tablet, Rfl: 3     oxyCODONE (ROXICODONE) 5 MG tablet, Take 1 tablet (5 mg) by mouth every 6 hours as needed for pain, Disp: 20 tablet, Rfl: 0     sertraline (ZOLOFT) 50 MG tablet, Take 1/2 tablet (25 mg) nightly for 2-3 weeks, then increase to 1 tablet (50 mg) nightly if necessary., Disp: 90 tablet, Rfl: 3     ALLERGIES:    Allergies   Allergen Reactions     No Known Allergies      Ragweeds         SOCIAL HISTORY:   Social History     Socioeconomic History     Marital status:      Spouse name: Delbert     Number of children: 3     Years of education: 15     Highest education level: Not on file   Occupational History     Occupation:      Employer: [employer]   Tobacco Use     Smoking status: Former     Packs/day: 0.00     Years: 7.00     Pack years: 0.00     Types: Cigarettes     Start date: 2010     Quit date: 2021     Years since quittin.6     Smokeless tobacco: Never     Tobacco comments:     I QUIT!!   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 Yes   Social History Narrative     from , live separately for years. Has male friend     Social Determinants of Health     Financial Resource Strain: Low Risk  (2022)    Overall Financial Resource Strain (CARDIA)      Difficulty of Paying Living Expenses: Not hard at all   Food Insecurity: No Food Insecurity (2022)    Hunger Vital Sign      Worried About Running Out of Food in the Last Year: Never true      Ran Out of Food in the Last Year: Never true   Transportation Needs: No Transportation Needs (2022)    PRAPARE - Transportation      Lack of Transportation (Medical): No      Lack of Transportation (Non-Medical): No   Physical Activity: Inactive (2022)    Exercise Vital Sign      Days of Exercise per Week: 0 days      Minutes of Exercise per Session: 0 min   Stress: Stress Concern Present (2022)    [employer] - Occupational Stress Questionnaire      Feeling of Stress : Rather much   Social Connections: Not on file   Intimate Partner Violence: Not on file   Housing Stability: Not on file        FAMILY HISTORY:   Family History   Problem Relation Age of Onset     Myocardial Infarction Father 40         at age 40     Hypertension Father          at 40.  Heart Attack     Lipids Father      C.A.D. Father          at age 40 from an MI     Arthritis Mother      Eye Disorder Mother         glaucoma  "    Genitourinary Problems Mother         hysterectomy     Hypertension Mother      Colon Cancer Mother 80     Liver Cancer Mother 80     Hyperlipidemia Mother      Cardiovascular Maternal Grandmother      Eye Disorder Maternal Grandmother         cataracts     Heart Disease Maternal Grandmother      Hypertension Maternal Grandmother      Cardiovascular Maternal Grandfather      Hypertension Maternal Grandfather      Cardiovascular Paternal Grandmother      Hypertension Paternal Grandmother      Cardiovascular Paternal Grandfather      Hypertension Paternal Grandfather      Musculoskeletal Disorder Paternal Uncle         MS     Heart Failure Maternal Aunt 85        EXAM:Vitals: /72 (BP Location: Left arm, Patient Position: Sitting, Cuff Size: Adult Regular)   Ht 1.676 m (5' 6\")   Wt 61.2 kg (135 lb)   BMI 21.79 kg/m    BMI= Body mass index is 21.79 kg/m .    General appearance: Patient is alert and fully cooperative with history & exam.  No sign of distress is noted during the visit.     Psychiatric: Affect is pleasant & appropriate.  Patient appears motivated to improve health.     Respiratory: Breathing is regular & unlabored while sitting.     HEENT: Hearing is intact to spoken word.  Speech is clear.  No gross evidence of visual impairment that would impact ambulation.     Vascular: DP & PT pulses are intact & regular bilaterally.  No significant edema or varicosities noted.  CFT and skin temperature is normal to both lower extremities.     Neurologic: Lower extremity sensation is intact to light touch.  No evidence of weakness or contracture in the lower extremities.  No evidence of neuropathy.    Dermatologic: Skin is intact to both lower extremities with adequate texture, turgor and tone about the integument.  No paronychia or evidence of soft tissue infection is noted.     Musculoskeletal: Patient is ambulatory with fracture boot about the right lower extremity.  Considerable soft edema is noted " surrounding the right ankle with crepitus laterally with any movement of the ankle.      Radiographs 3 views right ankle 3/30/2023 demonstrate displaced fracture starting at the joint line moving proximal and a spiral oblique fashion with posterior and lateral displacement.  There is shortening of the fibula.  Also medial malleolus fracture noted.    DEXA scan 11/21 demonstrates   right hip -2.1   left hip -1.4   lumbar spine -1.6    ASSESSMENT:       ICD-10-CM    1. Closed fracture of right tibia and fibula, initial encounter  S82.201A Orthopedic  Referral    S82.401A       2. Closed fracture of right tibia and fibula with routine healing, subsequent encounter  S82.201D oxyCODONE (ROXICODONE) 5 MG tablet    S82.401D            PLAN:  Reviewed patient's chart in [facility].      4/10/2023   Interpreted radiographs demonstrating unstable ankle fracture with posterior malleolus and lateral malleolus components.  There is shortening of the fibula fracture and a spiral oblique fashion.  Recommend open reduction internal fixation to bring the fibula back out to length and reduced the ankle mortise.  Also to provide stability of the tib-fib syndesmosis and ankle joint ankle mortise.    Recommend nonweightbearing at this time to help reduce discomfort.  Refilled Percocet for her.  She may also take her hydroxyzine in addition to the Percocet but most of her pain should be managed by nonweightbearing.  Recommend compression during the day and keep the ankle fracture boot in place day and night.  Palpable crepitus is noted about the fracture.      Bruce Galicia DPM        Please schedule for surgery, pre op H&P, and post ops.    Surgery:  Patient Name:  Lorna Gorman ([id number])  Procedure:   Open reduction internal fixation right ankle   Diagnosis:    Right ankle fracture  Assistant: first assist  Surgeon:  Bruce Galicia DPM  Anesthesia:  General and Post op popliteal block  PT type:  Same Day Surgery  Time  needed: 90 minutes  Patient position:  lying supine  Mini fluoro:  Yes  C-arm:  no  Equipment:  arthrex ankle fracture   Anticoagulation:  No  Vendor:  Request equipment rep with [employer] be present for this case, office [phone].   Surgeon Notes: Retired     Post op appts:    5-7 days po  12-15 days po  approx 4 weeks  approx 8 weeks    Expected work restrictions:  no weight bearing in splint until the incision is healed and then protected weightbearing in a fracture boot until 6 weeks postop    [facility] Discussed:  NO    Urgency to Schedule: No later than 4/14/2023, sooner is better.

## 2023-04-10 NOTE — TELEPHONE ENCOUNTER
Type of surgery: OPEN REDUCTION INTERNAL FIXATION right ankle    Location of surgery: [facility]  Date and time of surgery: 4/12  Surgeon: Grayson  Pre-Op Appt Date: Pt calling her [place] clinic   Post-Op Appt Date: TBD   Packet sent out: Yes  Pre-cert/Authorization completed:  Not Applicable  Date: NA

## 2023-04-10 NOTE — LETTER
"    4/10/2023         RE: Lorna oGrman  [id number] [facility] [address]        Dear Colleague,    Thank you for referring your patient, Lorna Gorman, to the [facility]. Please see a copy of my visit note below.    HPI:  Lorna Gorman is a 57 year old female who is seen in consultation at the request of Abelino Franks PA-C    Pt presents for eval of:   (Onset, Location, L/R, Character, Treatments, Injury if yes)    XR Right ankle 3/30/2023     Onset 3/29/2023, while walking, \"felt and heard ankle break\". Presents with lateral Right ankle and anterior Right lower leg pain, WB w/tall gray fx boot and karly friend, Juwan.  Constant, swelling, bruising, dull ache, sharp, stabbing, burning, warm, pain 2-10/10 worse with walking.    Oxycodone, ibuprofen, rest, elevation, wearing fx boot for daily activity only.    Retired .    History of osteopenia and bilateral wrist fractures after a fall on ice.    ROS:  10 point ROS neg other than the symptoms noted above in the HPI.    Patient Active Problem List   Diagnosis     Tobacco abuse disorder     CARDIOVASCULAR SCREENING; LDL GOAL LESS THAN 160     TITA (generalized anxiety disorder)     Benign essential hypertension     Hypertension goal BP (blood pressure) < 140/90     Dyshydrosis     ASCUS with positive high risk HPV cervical     Family history of colon cancer       PAST MEDICAL HISTORY:   Past Medical History:   Diagnosis Date     Abnormal Pap smear of cervix 01/22/2020    see problem list     Benign essential hypertension 05/18/2017     GENERALIZED ANXIETY DIS 05/15/2007     Hyperlipidemia with target LDL less than 130 11/06/2013     Hypertension goal BP (blood pressure) < 140/90 05/18/2017     Major depressive disorder, recurrent episode, mild (H) 12/09/2015     Migraine, unspecified, without mention of intractable migraine without mention of status migrainosus      Moderate Depression [296.32] 08/19/2009     " Premenstrual tension syndromes     PMDS on Sarafem and doing very well.     Tobacco abuse 2013        PAST SURGICAL HISTORY:   Past Surgical History:   Procedure Laterality Date     COLONOSCOPY N/A 2015    Procedure: COLONOSCOPY;  Surgeon: Ritesh Chiu MD;  Location: [facility] HYSTEROSCOPY, SURGICAL; W/ ENDOMETRIAL ABLATION, ANY METHOD  09     [facility] ANESTH,NOSE,SINUS SURGERY       [facility] RESEC HEAD OF PHALANX,TOE          MEDICATIONS:   Current Outpatient Medications:      alendronate (FOSAMAX) 35 MG tablet, Take 1 tablet (35 mg) by mouth every 7 days, Disp: 12 tablet, Rfl: 3     atorvastatin (LIPITOR) 10 MG tablet, TAKE 1 TABLET (10 MG) BY MOUTH DAILY, Disp: 90 tablet, Rfl: 1     hydrOXYzine (ATARAX) 10 MG tablet, Take 10 mg up to every 6 hours for any anxiety or panic feelings, may take 20-50 mg at bedtime for insomnia as needed, Disp: 90 tablet, Rfl: 3     losartan (COZAAR) 100 MG tablet, Take 1 tablet (100 mg) by mouth daily, Disp: 90 tablet, Rfl: 3     oxyCODONE (ROXICODONE) 5 MG tablet, Take 1 tablet (5 mg) by mouth every 6 hours as needed for pain, Disp: 20 tablet, Rfl: 0     sertraline (ZOLOFT) 50 MG tablet, Take 1/2 tablet (25 mg) nightly for 2-3 weeks, then increase to 1 tablet (50 mg) nightly if necessary., Disp: 90 tablet, Rfl: 3     ALLERGIES:    Allergies   Allergen Reactions     No Known Allergies      Ragweeds         SOCIAL HISTORY:   Social History     Socioeconomic History     Marital status:      Spouse name: Delbert     Number of children: 3     Years of education: 15     Highest education level: Not on file   Occupational History     Occupation:      Employer: [employer]   Tobacco Use     Smoking status: Former     Packs/day: 0.00     Years: 7.00     Pack years: 0.00     Types: Cigarettes     Start date: 2010     Quit date: 2021     Years since quittin.6     Smokeless tobacco: Never     Tobacco comments:     I QUIT!!   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 Yes   Social History Narrative     from , live separately for years. Has male friend     Social Determinants of Health     Financial Resource Strain: Low Risk  (6/30/2022)    Overall Financial Resource Strain (CARDIA)      Difficulty of Paying Living Expenses: Not hard at all   Food Insecurity: No Food Insecurity (6/30/2022)    Hunger Vital Sign      Worried About Running Out of Food in the Last Year: Never true      Ran Out of Food in the Last Year: Never true   Transportation Needs: No Transportation Needs (6/30/2022)    PRAPARE - Transportation      Lack of Transportation (Medical): No      Lack of Transportation (Non-Medical): No   Physical Activity: Inactive (6/30/2022)    Exercise Vital Sign      Days of Exercise per Week: 0 days      Minutes of Exercise per Session: 0 min   Stress: Stress Concern Present (6/30/2022)    [employer] - Occupational Stress Questionnaire      Feeling of Stress : Rather much   Social Connections: Not on file   Intimate Partner Violence: Not on file   Housing Stability: Not on file        FAMILY HISTORY:   Family History   Problem Relation  "Age of Onset     Myocardial Infarction Father 40         at age 40     Hypertension Father          at 40.  Heart Attack     Lipids Father      C.A.D. Father          at age 40 from an MI     Arthritis Mother      Eye Disorder Mother         glaucoma     Genitourinary Problems Mother         hysterectomy     Hypertension Mother      Colon Cancer Mother 80     Liver Cancer Mother 80     Hyperlipidemia Mother      Cardiovascular Maternal Grandmother      Eye Disorder Maternal Grandmother         cataracts     Heart Disease Maternal Grandmother      Hypertension Maternal Grandmother      Cardiovascular Maternal Grandfather      Hypertension Maternal Grandfather      Cardiovascular Paternal Grandmother      Hypertension Paternal Grandmother      Cardiovascular Paternal Grandfather      Hypertension Paternal Grandfather      Musculoskeletal Disorder Paternal Uncle         MS     Heart Failure Maternal Aunt 85        EXAM:Vitals: /72 (BP Location: Left arm, Patient Position: Sitting, Cuff Size: Adult Regular)   Ht 1.676 m (5' 6\")   Wt 61.2 kg (135 lb)   BMI 21.79 kg/m    BMI= Body mass index is 21.79 kg/m .    General appearance: Patient is alert and fully cooperative with history & exam.  No sign of distress is noted during the visit.     Psychiatric: Affect is pleasant & appropriate.  Patient appears motivated to improve health.     Respiratory: Breathing is regular & unlabored while sitting.     HEENT: Hearing is intact to spoken word.  Speech is clear.  No gross evidence of visual impairment that would impact ambulation.     Vascular: DP & PT pulses are intact & regular bilaterally.  No significant edema or varicosities noted.  CFT and skin temperature is normal to both lower extremities.     Neurologic: Lower extremity sensation is intact to light touch.  No evidence of weakness or contracture in the lower extremities.  No evidence of neuropathy.    Dermatologic: Skin is intact to both lower " extremities with adequate texture, turgor and tone about the integument.  No paronychia or evidence of soft tissue infection is noted.     Musculoskeletal: Patient is ambulatory with fracture boot about the right lower extremity.  Considerable soft edema is noted surrounding the right ankle with crepitus laterally with any movement of the ankle.      Radiographs 3 views right ankle 3/30/2023 demonstrate displaced fracture starting at the joint line moving proximal and a spiral oblique fashion with posterior and lateral displacement.  There is shortening of the fibula.  Also medial malleolus fracture noted.    DEXA scan 11/21 demonstrates   right hip -2.1   left hip -1.4   lumbar spine -1.6    ASSESSMENT:       ICD-10-CM    1. Closed fracture of right tibia and fibula, initial encounter  S82.201A Orthopedic  Referral    S82.401A       2. Closed fracture of right tibia and fibula with routine healing, subsequent encounter  S82.201D oxyCODONE (ROXICODONE) 5 MG tablet    S82.401D            PLAN:  Reviewed patient's chart in [facility].      4/10/2023   Interpreted radiographs demonstrating unstable ankle fracture with posterior malleolus and lateral malleolus components.  There is shortening of the fibula fracture and a spiral oblique fashion.  Recommend open reduction internal fixation to bring the fibula back out to length and reduced the ankle mortise.  Also to provide stability of the tib-fib syndesmosis and ankle joint ankle mortise.    Recommend nonweightbearing at this time to help reduce discomfort.  Refilled Percocet for her.  She may also take her hydroxyzine in addition to the Percocet but most of her pain should be managed by nonweightbearing.  Recommend compression during the day and keep the ankle fracture boot in place day and night.  Palpable crepitus is noted about the fracture.      Bruce Galicia DPM        Please schedule for surgery, pre op H&P, and post ops.    Surgery:  Patient Name:  Lorna GOLD  Sherif ([id number])  Procedure:   Open reduction internal fixation right ankle   Diagnosis:    Right ankle fracture  Assistant: first assist  Surgeon:  Bruce Galicia DPM  Anesthesia:  General and Post op popliteal block  PT type:  Same Day Surgery  Time needed: 90 minutes  Patient position:  lying supine  Mini fluoro:  Yes  C-arm:  no  Equipment:  arthrex ankle fracture   Anticoagulation:  No  Vendor:  Request equipment rep with [employer] be present for this case, office [phone].   Surgeon Notes: Retired     Post op appts:    5-7 days po  12-15 days po  approx 4 weeks  approx 8 weeks    Expected work restrictions:  no weight bearing in splint until the incision is healed and then protected weightbearing in a fracture boot until 6 weeks postop    [facility] Discussed:  NO    Urgency to Schedule: No later than 4/14/2023, sooner is better.          Again, thank you for allowing me to participate in the care of your patient.        Sincerely,        Bruce Galicia DPM

## 2023-04-12 ENCOUNTER — HOSPITAL ENCOUNTER (OUTPATIENT)
Dept: GENERAL RADIOLOGY | Facility: CLINIC | Age: 58
Discharge: HOME | End: 2023-04-12
Attending: PODIATRIST | Admitting: PODIATRIST
Payer: COMMERCIAL

## 2023-04-12 ENCOUNTER — HOSPITAL ENCOUNTER (OUTPATIENT)
Facility: CLINIC | Age: 58
Discharge: HOME | End: 2023-04-12
Attending: PODIATRIST | Admitting: PODIATRIST
Payer: COMMERCIAL

## 2023-04-12 ENCOUNTER — ANESTHESIA (OUTPATIENT)
Dept: SURGERY | Facility: CLINIC | Age: 58
End: 2023-04-12
Payer: COMMERCIAL

## 2023-04-12 ENCOUNTER — ANESTHESIA EVENT (OUTPATIENT)
Dept: SURGERY | Facility: CLINIC | Age: 58
End: 2023-04-12
Payer: COMMERCIAL

## 2023-04-12 VITALS
TEMPERATURE: 98.1 F | HEART RATE: 81 BPM | WEIGHT: 140 LBS | DIASTOLIC BLOOD PRESSURE: 71 MMHG | RESPIRATION RATE: 16 BRPM | OXYGEN SATURATION: 94 % | BODY MASS INDEX: 22.6 KG/M2 | SYSTOLIC BLOOD PRESSURE: 147 MMHG

## 2023-04-12 DIAGNOSIS — S82.401A CLOSED FRACTURE OF RIGHT TIBIA AND FIBULA, INITIAL ENCOUNTER: ICD-10-CM

## 2023-04-12 DIAGNOSIS — S82.201A CLOSED FRACTURE OF RIGHT TIBIA AND FIBULA, INITIAL ENCOUNTER: ICD-10-CM

## 2023-04-12 PROCEDURE — 710N000010 HC RECOVERY PHASE 1, LEVEL 2, PER MIN: Performed by: PODIATRIST

## 2023-04-12 PROCEDURE — 370N000017 HC ANESTHESIA TECHNICAL FEE, PER MIN: Performed by: PODIATRIST

## 2023-04-12 PROCEDURE — 250N000011 HC RX IP 250 OP 636: Performed by: NURSE ANESTHETIST, CERTIFIED REGISTERED

## 2023-04-12 PROCEDURE — 272N000001 HC OR GENERAL SUPPLY STERILE: Performed by: PODIATRIST

## 2023-04-12 PROCEDURE — 360N000084 HC SURGERY LEVEL 4 W/ FLUORO, PER MIN: Performed by: PODIATRIST

## 2023-04-12 PROCEDURE — 250N000025 HC SEVOFLURANE, PER MIN: Performed by: PODIATRIST

## 2023-04-12 PROCEDURE — 250N000013 HC RX MED GY IP 250 OP 250 PS 637: Performed by: PODIATRIST

## 2023-04-12 PROCEDURE — 999N000179 XR SURGERY CARM FLUORO LESS THAN 5 MIN W STILLS

## 2023-04-12 PROCEDURE — 999N000141 HC STATISTIC PRE-PROCEDURE NURSING ASSESSMENT: Performed by: PODIATRIST

## 2023-04-12 PROCEDURE — 27829 TREAT LOWER LEG JOINT: CPT | Mod: 51 | Performed by: PODIATRIST

## 2023-04-12 PROCEDURE — 250N000011 HC RX IP 250 OP 636: Performed by: PODIATRIST

## 2023-04-12 PROCEDURE — 250N000009 HC RX 250: Performed by: NURSE ANESTHETIST, CERTIFIED REGISTERED

## 2023-04-12 PROCEDURE — 27814 TREATMENT OF ANKLE FRACTURE: CPT | Mod: RT | Performed by: PODIATRIST

## 2023-04-12 PROCEDURE — 250N000009 HC RX 250: Performed by: PODIATRIST

## 2023-04-12 PROCEDURE — C1713 ANCHOR/SCREW BN/BN,TIS/BN: HCPCS | Performed by: PODIATRIST

## 2023-04-12 PROCEDURE — 710N000012 HC RECOVERY PHASE 2, PER MINUTE: Performed by: PODIATRIST

## 2023-04-12 PROCEDURE — 258N000003 HC RX IP 258 OP 636: Performed by: NURSE ANESTHETIST, CERTIFIED REGISTERED

## 2023-04-12 DEVICE — IMP PLATE ARTHREX LOCKING DIST FIBULA RT 4H SS AR-8943BR-04: Type: IMPLANTABLE DEVICE | Site: ANKLE | Status: FUNCTIONAL

## 2023-04-12 DEVICE — IMP SCR ARTHREX LP LOCKING 2.7X10MM SS AR-8827L-10: Type: IMPLANTABLE DEVICE | Site: ANKLE | Status: FUNCTIONAL

## 2023-04-12 DEVICE — K-LESS T-ROPE W/DRV SYN REPR SS AR-8925SS: Type: IMPLANTABLE DEVICE | Site: ANKLE | Status: FUNCTIONAL

## 2023-04-12 DEVICE — IMP SCR ARTHREX LP LOCKING 3.5X14MM SS AR-8835L-14: Type: IMPLANTABLE DEVICE | Site: ANKLE | Status: FUNCTIONAL

## 2023-04-12 DEVICE — IMP SCR ARTHREX LP LOCKING 3.5X12MM SS AR-8835L-12: Type: IMPLANTABLE DEVICE | Site: ANKLE | Status: FUNCTIONAL

## 2023-04-12 RX ORDER — PROPOFOL 10 MG/ML
INJECTION, EMULSION INTRAVENOUS CONTINUOUS PRN
Status: DISCONTINUED | OUTPATIENT
Start: 2023-04-12 | End: 2023-04-12

## 2023-04-12 RX ORDER — PROPOFOL 10 MG/ML
INJECTION, EMULSION INTRAVENOUS PRN
Status: DISCONTINUED | OUTPATIENT
Start: 2023-04-12 | End: 2023-04-12

## 2023-04-12 RX ORDER — SODIUM CHLORIDE, SODIUM LACTATE, POTASSIUM CHLORIDE, CALCIUM CHLORIDE 600; 310; 30; 20 MG/100ML; MG/100ML; MG/100ML; MG/100ML
INJECTION, SOLUTION INTRAVENOUS CONTINUOUS
Status: DISCONTINUED | OUTPATIENT
Start: 2023-04-12 | End: 2023-04-12 | Stop reason: HOSPADM

## 2023-04-12 RX ORDER — FENTANYL CITRATE 50 UG/ML
INJECTION, SOLUTION INTRAMUSCULAR; INTRAVENOUS PRN
Status: DISCONTINUED | OUTPATIENT
Start: 2023-04-12 | End: 2023-04-12

## 2023-04-12 RX ORDER — OXYCODONE AND ACETAMINOPHEN 5; 325 MG/1; MG/1
1 TABLET ORAL
Status: COMPLETED | OUTPATIENT
Start: 2023-04-12 | End: 2023-04-12

## 2023-04-12 RX ORDER — LIDOCAINE HYDROCHLORIDE 20 MG/ML
INJECTION, SOLUTION INFILTRATION; PERINEURAL PRN
Status: DISCONTINUED | OUTPATIENT
Start: 2023-04-12 | End: 2023-04-12 | Stop reason: HOSPADM

## 2023-04-12 RX ORDER — OXYCODONE HYDROCHLORIDE 5 MG/1
5 TABLET ORAL EVERY 4 HOURS PRN
Qty: 20 TABLET | Refills: 0 | Status: SHIPPED | OUTPATIENT
Start: 2023-04-12 | End: 2023-04-26

## 2023-04-12 RX ORDER — LIDOCAINE HYDROCHLORIDE 10 MG/ML
INJECTION, SOLUTION INFILTRATION; PERINEURAL PRN
Status: DISCONTINUED | OUTPATIENT
Start: 2023-04-12 | End: 2023-04-12

## 2023-04-12 RX ORDER — ONDANSETRON 2 MG/ML
4 INJECTION INTRAMUSCULAR; INTRAVENOUS EVERY 30 MIN PRN
Status: DISCONTINUED | OUTPATIENT
Start: 2023-04-12 | End: 2023-04-12 | Stop reason: HOSPADM

## 2023-04-12 RX ORDER — ONDANSETRON 2 MG/ML
INJECTION INTRAMUSCULAR; INTRAVENOUS PRN
Status: DISCONTINUED | OUTPATIENT
Start: 2023-04-12 | End: 2023-04-12

## 2023-04-12 RX ORDER — HYDROXYZINE HYDROCHLORIDE 25 MG/1
25 TABLET, FILM COATED ORAL EVERY 6 HOURS PRN
Status: DISCONTINUED | OUTPATIENT
Start: 2023-04-12 | End: 2023-04-12 | Stop reason: HOSPADM

## 2023-04-12 RX ORDER — FENTANYL CITRATE 50 UG/ML
50 INJECTION, SOLUTION INTRAMUSCULAR; INTRAVENOUS EVERY 5 MIN PRN
Status: DISCONTINUED | OUTPATIENT
Start: 2023-04-12 | End: 2023-04-12 | Stop reason: HOSPADM

## 2023-04-12 RX ORDER — CEFAZOLIN SODIUM/WATER 2 G/20 ML
2 SYRINGE (ML) INTRAVENOUS SEE ADMIN INSTRUCTIONS
Status: DISCONTINUED | OUTPATIENT
Start: 2023-04-12 | End: 2023-04-12 | Stop reason: HOSPADM

## 2023-04-12 RX ORDER — HALOPERIDOL 5 MG/ML
1 INJECTION INTRAMUSCULAR
Status: DISCONTINUED | OUTPATIENT
Start: 2023-04-12 | End: 2023-04-12 | Stop reason: HOSPADM

## 2023-04-12 RX ORDER — OXYCODONE AND ACETAMINOPHEN 5; 325 MG/1; MG/1
1 TABLET ORAL
Status: DISCONTINUED | OUTPATIENT
Start: 2023-04-12 | End: 2023-04-12

## 2023-04-12 RX ORDER — ONDANSETRON 4 MG/1
4 TABLET, ORALLY DISINTEGRATING ORAL
Status: DISCONTINUED | OUTPATIENT
Start: 2023-04-12 | End: 2023-04-12 | Stop reason: HOSPADM

## 2023-04-12 RX ORDER — CEFAZOLIN SODIUM/WATER 2 G/20 ML
2 SYRINGE (ML) INTRAVENOUS
Status: COMPLETED | OUTPATIENT
Start: 2023-04-12 | End: 2023-04-12

## 2023-04-12 RX ORDER — FENTANYL CITRATE 50 UG/ML
25 INJECTION, SOLUTION INTRAMUSCULAR; INTRAVENOUS EVERY 5 MIN PRN
Status: DISCONTINUED | OUTPATIENT
Start: 2023-04-12 | End: 2023-04-12 | Stop reason: HOSPADM

## 2023-04-12 RX ORDER — ONDANSETRON 4 MG/1
4 TABLET, ORALLY DISINTEGRATING ORAL EVERY 30 MIN PRN
Status: DISCONTINUED | OUTPATIENT
Start: 2023-04-12 | End: 2023-04-12 | Stop reason: HOSPADM

## 2023-04-12 RX ORDER — HYDROMORPHONE HYDROCHLORIDE 1 MG/ML
0.4 INJECTION, SOLUTION INTRAMUSCULAR; INTRAVENOUS; SUBCUTANEOUS EVERY 5 MIN PRN
Status: DISCONTINUED | OUTPATIENT
Start: 2023-04-12 | End: 2023-04-12 | Stop reason: HOSPADM

## 2023-04-12 RX ORDER — BUPIVACAINE HYDROCHLORIDE AND EPINEPHRINE 5; 5 MG/ML; UG/ML
INJECTION, SOLUTION PERINEURAL PRN
Status: DISCONTINUED | OUTPATIENT
Start: 2023-04-12 | End: 2023-04-12

## 2023-04-12 RX ORDER — HYDROMORPHONE HYDROCHLORIDE 1 MG/ML
0.2 INJECTION, SOLUTION INTRAMUSCULAR; INTRAVENOUS; SUBCUTANEOUS EVERY 5 MIN PRN
Status: DISCONTINUED | OUTPATIENT
Start: 2023-04-12 | End: 2023-04-12 | Stop reason: HOSPADM

## 2023-04-12 RX ORDER — DEXAMETHASONE SODIUM PHOSPHATE 10 MG/ML
INJECTION, SOLUTION INTRAMUSCULAR; INTRAVENOUS PRN
Status: DISCONTINUED | OUTPATIENT
Start: 2023-04-12 | End: 2023-04-12

## 2023-04-12 RX ORDER — HYDROXYZINE HYDROCHLORIDE 25 MG/1
25 TABLET, FILM COATED ORAL
Status: COMPLETED | OUTPATIENT
Start: 2023-04-12 | End: 2023-04-12

## 2023-04-12 RX ORDER — METHOCARBAMOL 750 MG/1
750 TABLET, FILM COATED ORAL
Status: DISCONTINUED | OUTPATIENT
Start: 2023-04-12 | End: 2023-04-12 | Stop reason: HOSPADM

## 2023-04-12 RX ADMIN — SODIUM CHLORIDE, POTASSIUM CHLORIDE, SODIUM LACTATE AND CALCIUM CHLORIDE: 600; 310; 30; 20 INJECTION, SOLUTION INTRAVENOUS at 12:22

## 2023-04-12 RX ADMIN — MIDAZOLAM 2 MG: 1 INJECTION INTRAMUSCULAR; INTRAVENOUS at 13:47

## 2023-04-12 RX ADMIN — ONDANSETRON 4 MG: 2 INJECTION INTRAMUSCULAR; INTRAVENOUS at 14:12

## 2023-04-12 RX ADMIN — FENTANYL CITRATE 50 MCG: 50 INJECTION, SOLUTION INTRAMUSCULAR; INTRAVENOUS at 14:04

## 2023-04-12 RX ADMIN — DEXMEDETOMIDINE HYDROCHLORIDE 10 MCG: 100 INJECTION, SOLUTION INTRAVENOUS at 14:23

## 2023-04-12 RX ADMIN — FENTANYL CITRATE 50 MCG: 50 INJECTION, SOLUTION INTRAMUSCULAR; INTRAVENOUS at 14:13

## 2023-04-12 RX ADMIN — LIDOCAINE HYDROCHLORIDE 0.1 ML: 10 INJECTION, SOLUTION EPIDURAL; INFILTRATION; INTRACAUDAL; PERINEURAL at 12:23

## 2023-04-12 RX ADMIN — HYDROXYZINE HYDROCHLORIDE 25 MG: 25 TABLET, FILM COATED ORAL at 17:19

## 2023-04-12 RX ADMIN — LIDOCAINE HYDROCHLORIDE 50 MG: 10 INJECTION, SOLUTION INFILTRATION; PERINEURAL at 13:58

## 2023-04-12 RX ADMIN — BUPIVACAINE HYDROCHLORIDE AND EPINEPHRINE BITARTRATE 10 ML: 5; .005 INJECTION, SOLUTION PERINEURAL at 15:59

## 2023-04-12 RX ADMIN — DEXAMETHASONE SODIUM PHOSPHATE 5 MG: 10 INJECTION, SOLUTION INTRAMUSCULAR; INTRAVENOUS at 16:04

## 2023-04-12 RX ADMIN — SODIUM CHLORIDE, POTASSIUM CHLORIDE, SODIUM LACTATE AND CALCIUM CHLORIDE: 600; 310; 30; 20 INJECTION, SOLUTION INTRAVENOUS at 15:42

## 2023-04-12 RX ADMIN — FENTANYL CITRATE 25 MCG: 50 INJECTION INTRAMUSCULAR; INTRAVENOUS at 16:18

## 2023-04-12 RX ADMIN — HYDROMORPHONE HYDROCHLORIDE 0.4 MG: 1 INJECTION, SOLUTION INTRAMUSCULAR; INTRAVENOUS; SUBCUTANEOUS at 16:29

## 2023-04-12 RX ADMIN — Medication 2 G: at 13:45

## 2023-04-12 RX ADMIN — DEXAMETHASONE SODIUM PHOSPHATE 5 MG: 10 INJECTION, SOLUTION INTRAMUSCULAR; INTRAVENOUS at 15:59

## 2023-04-12 RX ADMIN — BUPIVACAINE HYDROCHLORIDE AND EPINEPHRINE BITARTRATE 10 ML: 5; .005 INJECTION, SOLUTION PERINEURAL at 16:04

## 2023-04-12 RX ADMIN — OXYCODONE HYDROCHLORIDE AND ACETAMINOPHEN 1 TABLET: 5; 325 TABLET ORAL at 17:18

## 2023-04-12 RX ADMIN — PROPOFOL 200 MG: 10 INJECTION, EMULSION INTRAVENOUS at 13:58

## 2023-04-12 RX ADMIN — FENTANYL CITRATE 25 MCG: 50 INJECTION INTRAMUSCULAR; INTRAVENOUS at 16:13

## 2023-04-12 RX ADMIN — PROPOFOL 200 MCG/KG/MIN: 10 INJECTION, EMULSION INTRAVENOUS at 13:58

## 2023-04-12 ASSESSMENT — ACTIVITIES OF DAILY LIVING (ADL)
ADLS_ACUITY_SCORE: 33
ADLS_ACUITY_SCORE: 35

## 2023-04-12 ASSESSMENT — LIFESTYLE VARIABLES: TOBACCO_USE: 1

## 2023-04-12 NOTE — ANESTHESIA PROCEDURE NOTES
Airway       Patient location during procedure: OR  Staff -        CRNA: Meredith Martínez APRN CRNA       Performed By: CRNA  Consent for Airway        Urgency: elective  Indications and Patient Condition       Indications for airway management: jaki-procedural       Induction type:intravenous       Mask difficulty assessment: 1 - vent by mask    Final Airway Details       Final airway type: supraglottic airway    Supraglottic Airway Details        Type: LMA       Brand: LMA Unique       LMA size: 4    Post intubation assessment        Placement verified by: capnometry, equal breath sounds and chest rise        Number of attempts at approach: 1       Number of other approaches attempted: 0       Secured with: silk tape       Ease of procedure: easy       Dentition: Intact and Unchanged

## 2023-04-12 NOTE — OR NURSING
Patient fitted for appropriate sized crutches pre-op.  Given written instruction and demonstrated crutch walking.      Kavya Ziegler RN

## 2023-04-12 NOTE — DISCHARGE INSTRUCTIONS
[facility]   Adult Discharge Orders & Instructions     For 24 hours after surgery    Get plenty of rest.  A responsible adult must stay with you for at least 24 hours after you leave the hospital.   Do not drive or use heavy equipment.  If you have weakness or tingling, don't drive or use heavy equipment until this feeling goes away.  Do not drink alcohol.  Avoid strenuous or risky activities.  Ask for help when climbing stairs.   You may feel lightheaded.  If so, sit for a few minutes before standing.  Have someone help you get up.   You may have a slight fever. Call the doctor if your fever is over 100 F (37.7 C) (taken under the tongue) or lasts longer than 24 hours.  You may have a dry mouth, a sore throat, muscle aches or trouble sleeping.  These should go away after 24 hours.  Do not make important or legal decisions.  We don t expect you to have any problems from the surgery or treatment you had today. Just in case, here s what to do if you have pain, upset stomach (nausea), bleeding or infection:  Pain:  Take medicines your doctor has prescribed or over-the-counter medicine they have suggested. Resting and using ice packs can help, too. For surgery on an arm or leg, raise it on a pillow to ease swelling. Call your doctor if these methods don t work.  Copyright Nitin Cueva, Licensed under [employer]  Upset stomach (nausea):  Take anti-nausea medicine approved by your doctor. Drink clear liquids like apple juice, ginger ale, broth or 7-Up. Be sure to drink enough fluids. Rest can help, too. Move to normal foods when you re ready.   Bleeding:  In the first 24 hours, you may see a little blood on your dressing, about the size of a quarter. You don t need to worry about this much blood, but if the blood spot keeps getting bigger:  Put pressure on the wound if you can, AND  Call your doctor.  Copyright [employer], Licensed under [employer]  Fever/Infection: Please call  your doctor if you have any of these signs:  Redness  Swelling  Wound feels warm  Pain gets worse  Bad-smelling fluid leaks from wound  Fever or chills  Call your doctor for any of the followin.  It has been over 8 to 10 hours since surgery and you are still not able to urinate (pass water).    2.  Headache for over 24 hours.        Nurse advice line: [phone]

## 2023-04-12 NOTE — ANESTHESIA CARE TRANSFER NOTE
Patient: Lorna Gorman    Procedure: Procedure(s):  OPEN REDUCTION INTERNAL FIXATION right ankle       Diagnosis: Closed fracture of right tibia and fibula, initial encounter [S82.201A, S82.401A]  Diagnosis Additional Information: No value filed.    Anesthesia Type:   General     Note:    Oropharynx: oropharynx clear of all foreign objects and spontaneously breathing  Level of Consciousness: drowsy  Oxygen Supplementation: face mask    Independent Airway: airway patency satisfactory and stable  Dentition: dentition unchanged  Vital Signs Stable: post-procedure vital signs reviewed and stable  Report to RN Given: handoff report given  Patient transferred to: PACU    Handoff Report: Identifed the Patient, Identified the Reponsible Provider, Reviewed the pertinent medical history, Discussed the surgical course, Reviewed Intra-OP anesthesia mangement and issues during anesthesia, Set expectations for post-procedure period and Allowed opportunity for questions and acknowledgement of understanding      Vitals:  Vitals Value Taken Time   BP     Temp     Pulse     Resp     SpO2 99 % 04/12/23 1609   Vitals shown include unvalidated device data.    Electronically Signed By: MAKEDA Martins CRNA  April 12, 2023  4:10 PM

## 2023-04-12 NOTE — ANESTHESIA PREPROCEDURE EVALUATION
Anesthesia Pre-Procedure Evaluation    Patient: Lorna Gorman   MRN: [id number] : 1965        Procedure : Procedure(s):  OPEN REDUCTION INTERNAL FIXATION right ankle          Past Medical History:   Diagnosis Date     Abnormal Pap smear of cervix 2020    see problem list     Benign essential hypertension 2017     GENERALIZED ANXIETY DIS 05/15/2007     Hyperlipidemia with target LDL less than 130 2013     Hypertension goal BP (blood pressure) < 140/90 2017     Major depressive disorder, recurrent episode, mild (H) 2015     Migraine, unspecified, without mention of intractable migraine without mention of status migrainosus      Moderate Depression [296.32] 2009     Premenstrual tension syndromes     PMDS on Sarafem and doing very well.     Tobacco abuse 2013      Past Surgical History:   Procedure Laterality Date     COLONOSCOPY N/A 2015    Procedure: COLONOSCOPY;  Surgeon: Ritesh Chiu MD;  Location:  GI     [facility] HYSTEROSCOPY, SURGICAL; W/ ENDOMETRIAL ABLATION, ANY METHOD  09     [facility] ANESTH,NOSE,SINUS SURGERY       [facility] RESEC HEAD OF PHALANX,TOE        Allergies   Allergen Reactions     No Known Allergies      Ragweeds       Social History     Tobacco Use     Smoking status: Former     Packs/day: 0.00     Years: 7.00     Pack years: 0.00     Types: Cigarettes     Start date: 2010     Quit date: 2021     Years since quittin.6     Smokeless tobacco: Never     Tobacco comments:     I QUIT!!   Vaping Use     Vaping status: Not on file   Substance Use Topics     Alcohol use: Yes     Alcohol/week: 7.0 standard drinks of alcohol     Types: 7 Glasses of wine per week     Comment: daily 1-2 wine      Wt Readings from Last 1 Encounters:   04/10/23 61.2 kg (135 lb)        Anesthesia Evaluation   Pt has had prior anesthetic. Type: MAC and General.    No history of anesthetic complications       ROS/MED HX  ENT/Pulmonary:  - neg pulmonary ROS   (+)  tobacco use,     Neurologic:  - neg neurologic ROS     Cardiovascular:     (+) hypertension-----Previous cardiac testing   Echo: Date: Results:    Stress Test: Date: Results:    ECG Reviewed: Date: 2016 Results:  SR  Cath: Date: Results:      METS/Exercise Tolerance: >4 METS    Hematologic:  - neg hematologic  ROS     Musculoskeletal:  - neg musculoskeletal ROS     GI/Hepatic:  - neg GI/hepatic ROS     Renal/Genitourinary:  - neg Renal ROS     Endo:  - neg endo ROS     Psychiatric/Substance Use:     (+) anxiety     Infectious Disease:  - neg infectious disease ROS     Malignancy:  - neg malignancy ROS     Other:  - neg other ROS          Physical Exam    Airway  airway exam normal      Mallampati: II   TM distance: > 3 FB   Neck ROM: full   Mouth opening: > 3 cm    Respiratory Devices and Support         Dental           Cardiovascular   cardiovascular exam normal       Rhythm and rate: regular and normal     Pulmonary   pulmonary exam normal        breath sounds clear to auscultation           OUTSIDE LABS:  CBC:   Lab Results   Component Value Date    WBC 6.9 12/17/2016    HGB 15.0 12/17/2016    HGB 14.3 08/19/2009    HCT 43.8 12/17/2016     12/17/2016     BMP:   Lab Results   Component Value Date     06/30/2022     02/05/2021    POTASSIUM 4.2 06/30/2022    POTASSIUM 4.1 02/05/2021    CHLORIDE 104 06/30/2022    CHLORIDE 100 02/05/2021    CO2 30 06/30/2022    CO2 28 02/05/2021    BUN 14 06/30/2022    BUN 12 02/05/2021    CR 0.77 06/30/2022    CR 0.66 02/05/2021    GLC 86 06/30/2022    GLC 91 02/05/2021     COAGS:   Lab Results   Component Value Date    PTT 30 12/17/2016    INR 0.95 12/17/2016     POC:   Lab Results   Component Value Date    HCG  12/18/2009     Negative   This test provides a presumptive diagnosis of pregnancy or non-pregnancy. A   confirmed pregnancy diagnosis should only be made by a physician after all   clinical and laboratory findings have been evaluated.     HEPATIC:   Lab  Results   Component Value Date    ALBUMIN 3.6 02/05/2021    PROTTOTAL 7.1 02/05/2021     (H) 06/30/2022     (H) 06/30/2022    ALKPHOS 87 02/05/2021    BILITOTAL 0.5 02/05/2021    BILIDIRECT 0.0 01/02/2003     OTHER:   Lab Results   Component Value Date    PH 7.0 01/02/2003    EDITA 8.7 06/30/2022    TSH 1.47 10/23/2012    CRP <2.9 12/17/2016       Anesthesia Plan    ASA Status:  2   NPO Status:  NPO Appropriate    Anesthesia Type: General.     - Airway: LMA   Induction: Intravenous.   Maintenance: Balanced.        Consents    Anesthesia Plan(s) and associated risks, benefits, and realistic alternatives discussed. Questions answered and patient/representative(s) expressed understanding.     - Discussed: Risks, Benefits and Alternatives for BOTH SEDATION and the PROCEDURE were discussed     - Discussed with:  Patient      - Extended Intubation/Ventilatory Support Discussed: No.      - Patient is DNR/DNI Status: No    Use of blood products discussed: No .     Postoperative Care    Pain management: IV analgesics, Multi-modal analgesia, Peripheral nerve block (Single Shot).   PONV prophylaxis: Ondansetron (or other 5HT-3), Dexamethasone or Solumedrol, Background Propofol Infusion     Comments:    Other Comments: The risks and benefits of anesthesia, and the alternatives where applicable, have been discussed with the patient, and they wish to proceed.            Meredith Martínez, MAKEDA CRNA

## 2023-04-12 NOTE — ANESTHESIA POSTPROCEDURE EVALUATION
Patient: Lorna Gorman    Procedure: Procedure(s):  OPEN REDUCTION INTERNAL FIXATION right ankle       Anesthesia Type:  General    Note:  Disposition: Outpatient   Postop Pain Control: Uneventful            Sign Out: Well controlled pain   PONV: No   Neuro/Psych: Uneventful            Sign Out: Acceptable/Baseline neuro status   Airway/Respiratory: Uneventful            Sign Out: Acceptable/Baseline resp. status   CV/Hemodynamics: Uneventful            Sign Out: Acceptable CV status   Other NRE: NONE   DID A NON-ROUTINE EVENT OCCUR? No    Event details/Postop Comments:  Pt was happy with anesthesia care.  No complications.  I will follow up with the pt if needed.           Last vitals:  Vitals Value Taken Time   /90 04/12/23 1635   Temp 97.7  F (36.5  C) 04/12/23 1641   Pulse 81 04/12/23 1645   Resp 22 04/12/23 1646   SpO2 96 % 04/12/23 1647   Vitals shown include unvalidated device data.    Electronically Signed By: MAKEDA Coreas CRNA  April 12, 2023  6:19 PM

## 2023-04-12 NOTE — ANESTHESIA PROCEDURE NOTES
Popliteal Procedure Note    Pre-Procedure   Staff -        CRNA: Meredith Martínez APRN CRNA       Performed By: CRNA       Location: post-op       Procedure Start/Stop Times: 4/12/2023 3:55 PM and 4/12/2023 4:04 PM       Pre-Anesthestic Checklist: patient identified, IV checked, site marked, risks and benefits discussed, informed consent, monitors and equipment checked, pre-op evaluation, at physician/surgeon's request and post-op pain management  Timeout:       Correct Patient: Yes        Correct Procedure: Yes        Correct Site: Yes        Correct Position: Yes        Correct Laterality: Yes        Site Marked: Yes  Procedure Documentation  Procedure: Popliteal       Diagnosis: ORIF RIGHT ANKLE       Laterality: right       Patient Position: supine       Patient Prep/Sterile Barriers: sterile gloves, mask       Skin prep: Chloraprep       Local skin infiltrated with 1 mL of 1% lidocaine.        Needle Type: insulated       Needle Gauge: 21.        Needle Length (millimeters): 100        Ultrasound guided       1. Ultrasound was used to identify targeted nerve, plexus, vascular marker, or fascial plane and place a needle adjacent to it in real-time.       2. Ultrasound was used to visualize the spread of anesthetic in close proximity to the above referenced structure.       3. A permanent image is entered into the patient's record.       Nerve Stim: Initial Level 0.05 mA.  Lowest motor response mA.    Assessment/Narrative         The placement was negative for: blood aspirated, painful injection and site bleeding       Paresthesias: No.       Test dose of mL at.         Test dose negative, 3 minutes after injection, for signs of intravascular, subdural, or intrathecal injection.       Bolus given via needle..        Secured via.        Insertion/Infusion Method: Single Shot       Complications: none       Injection made incrementally with aspirations every 5 mL.    Medication(s) Administered   Medication  "Administration Time: 4/12/2023 3:55 PM     Comments:  Pt tolerated procedure well.  NO complications noted.  Will follow if needed.      FOR [facility] ([facility] ONLY:   Pain Team Contact information: please page the Pain Team Via [employer]. Search \"Pain\". During daytime hours, please page the attending first. At night please page the resident first.      "

## 2023-04-12 NOTE — BRIEF OP NOTE
[facility] Brief Operative Note    Pre-operative diagnosis: Closed fracture of right tibia and fibula, initial encounter [S82.201A, S82.401A]   Post-operative diagnosis Same    Procedure: Procedure(s):  OPEN REDUCTION INTERNAL FIXATION right ankle   Surgeon(s): Surgeon(s) and Role:     * Bruce Galicia DPM - Primary     * Karina Munoz PA-C - Assisting   Estimated blood loss: 10 mL    Specimens: * No specimens in log *   Findings: none

## 2023-04-12 NOTE — PROGRESS NOTES
"HPI:  Lorna Gorman is a 57 year old female who is seen in consultation at the request of Abelino Franks PA-C    Pt presents for eval of:   (Onset, Location, L/R, Character, Treatments, Injury if yes)    XR Right ankle 3/30/2023     Onset 3/29/2023, while walking, \"felt and heard ankle break\". Presents with lateral Right ankle and anterior Right lower leg pain, WB w/tall gray fx boot and karly friend, Juwan.  Constant, swelling, bruising, dull ache, sharp, stabbing, burning, warm, pain 2-10/10 worse with walking.    Oxycodone, ibuprofen, rest, elevation, wearing fx boot for daily activity only.    Retired .    History of osteopenia and bilateral wrist fractures after a fall on ice.    ROS:  10 point ROS neg other than the symptoms noted above in the HPI.    Patient Active Problem List   Diagnosis     Tobacco abuse disorder     CARDIOVASCULAR SCREENING; LDL GOAL LESS THAN 160     TITA (generalized anxiety disorder)     Benign essential hypertension     Hypertension goal BP (blood pressure) < 140/90     Dyshydrosis     ASCUS with positive high risk HPV cervical     Family history of colon cancer       PAST MEDICAL HISTORY:   Past Medical History:   Diagnosis Date     Abnormal Pap smear of cervix 01/22/2020    see problem list     Benign essential hypertension 05/18/2017     GENERALIZED ANXIETY DIS 05/15/2007     Hyperlipidemia with target LDL less than 130 11/06/2013     Hypertension goal BP (blood pressure) < 140/90 05/18/2017     Major depressive disorder, recurrent episode, mild (H) 12/09/2015     Migraine, unspecified, without mention of intractable migraine without mention of status migrainosus      Moderate Depression [296.32] 08/19/2009     Premenstrual tension syndromes     PMDS on Sarafem and doing very well.     Tobacco abuse 11/06/2013        PAST SURGICAL HISTORY:   Past Surgical History:   Procedure Laterality Date     COLONOSCOPY N/A 2/9/2015    Procedure: COLONOSCOPY;  Surgeon: Aram, " Ritesh NEELY MD;  Location: [facility] HYSTEROSCOPY, SURGICAL; W/ ENDOMETRIAL ABLATION, ANY METHOD  09     [facility] ANESTH,NOSE,SINUS SURGERY       [facility] RESEC HEAD OF PIETER,TOE          MEDICATIONS:   Current Facility-Administered Medications:      ceFAZolin Sodium (ANCEF) injection 2 g, 2 g, Intravenous, Pre-Op/Pre-procedure x 1 dose, Bruce Galicia, DPCHONG     ceFAZolin Sodium (ANCEF) injection 2 g, 2 g, Intravenous, See Admin Instructions, Bruce Galicia DPM     lactated ringers infusion, , Intravenous, Continuous, PossMeredith morrison, APRN CRNA, Last Rate: 10 mL/hr at 23 1222, New Bag at 23 1222     lidocaine 1 % 0.1-1 mL, 0.1-1 mL, Other, Q1H PRN, RaymundoinMeredithefalfonso, APRN CRNA, 0.1 mL at 23 1223     ALLERGIES:    Allergies   Allergen Reactions     No Known Allergies      Ragweeds         SOCIAL HISTORY:   Social History     Socioeconomic History     Marital status:      Spouse name: Delbert     Number of children: 3     Years of education: 15     Highest education level: Not on file   Occupational History     Occupation:      Employer: [employer]   Tobacco Use     Smoking status: Former     Packs/day: 0.00     Years: 7.00     Pack years: 0.00     Types: Cigarettes     Start date: 2010     Quit date: 2021     Years since quittin.6     Smokeless tobacco: Never     Tobacco comments:     I QUIT!!   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 Yes   Social History Narrative     from , live separately for years. Has male friend     Social Determinants of Health     Financial Resource Strain: Low Risk  (2022)    Overall Financial Resource Strain (CARDIA)      Difficulty of Paying Living Expenses: Not hard at all   Food Insecurity: No Food Insecurity (2022)    Hunger Vital Sign      Worried About Running Out of Food in the Last Year: Never true      Ran Out of Food in the Last Year: Never true   Transportation Needs: No Transportation Needs (2022)    PRAPARE - Transportation      Lack of Transportation (Medical): No      Lack of Transportation (Non-Medical): No   Physical Activity: Inactive (2022)    Exercise Vital Sign      Days of Exercise per Week: 0 days      Minutes of Exercise per Session: 0 min   Stress: Stress Concern Present (2022)    [employer] - Occupational Stress Questionnaire      Feeling of Stress : Rather much   Social Connections: Not on file   Intimate Partner Violence: Not on file   Housing Stability: Not on file        FAMILY HISTORY:   Family History   Problem Relation Age of Onset     Myocardial Infarction Father 40         at age 40     Hypertension Father          at 40.  Heart Attack     Lipids Father      C.A.D. Father          at age 40 from an MI     Arthritis Mother      Eye Disorder Mother         glaucoma     Genitourinary Problems Mother         hysterectomy     Hypertension Mother      Colon Cancer Mother 80     Liver Cancer Mother 80     Hyperlipidemia Mother      Cardiovascular Maternal Grandmother      Eye Disorder Maternal Grandmother         cataracts     Heart Disease  Maternal Grandmother      Hypertension Maternal Grandmother      Cardiovascular Maternal Grandfather      Hypertension Maternal Grandfather      Cardiovascular Paternal Grandmother      Hypertension Paternal Grandmother      Cardiovascular Paternal Grandfather      Hypertension Paternal Grandfather      Musculoskeletal Disorder Paternal Uncle         MS     Heart Failure Maternal Aunt 85        EXAM:Vitals: BP (!) 163/94   Pulse 90   Temp 97.6  F (36.4  C) (Oral)   Resp 16   Wt 63.5 kg (140 lb)   BMI 22.60 kg/m    BMI= Body mass index is 22.6 kg/m .    General appearance: Patient is alert and fully cooperative with history & exam.  No sign of distress is noted during the visit.     Psychiatric: Affect is pleasant & appropriate.  Patient appears motivated to improve health.     Respiratory: Breathing is regular & unlabored while sitting.     HEENT: Hearing is intact to spoken word.  Speech is clear.  No gross evidence of visual impairment that would impact ambulation.     Vascular: DP & PT pulses are intact & regular bilaterally.  No significant edema or varicosities noted.  CFT and skin temperature is normal to both lower extremities.     Neurologic: Lower extremity sensation is intact to light touch.  No evidence of weakness or contracture in the lower extremities.  No evidence of neuropathy.    Dermatologic: Skin is intact to both lower extremities with adequate texture, turgor and tone about the integument.  No paronychia or evidence of soft tissue infection is noted.     Musculoskeletal: Patient is ambulatory with fracture boot about the right lower extremity.  Considerable soft edema is noted surrounding the right ankle with crepitus laterally with any movement of the ankle.      Radiographs 3 views right ankle 3/30/2023 demonstrate displaced fracture starting at the joint line moving proximal and a spiral oblique fashion with posterior and lateral displacement.  There is shortening of the fibula.  Also  medial malleolus fracture noted.    DEXA scan 11/21 demonstrates   right hip -2.1   left hip -1.4   lumbar spine -1.6    ASSESSMENT:       ICD-10-CM    1. Closed fracture of right tibia and fibula, initial encounter  S82.201A Cleanse skin for procedure    S82.401A Nozin Nasal  Popswab     Void on call to OR     Notify Provider - Anticoagulants and Antiplatelets     NPO per Anesthesia Guidelines for Procedure/Surgery Except for: Meds     Apply Pneumatic Compression Device (PCD)     Pneumatic Compression Device (PCD) (Equipment)     ceFAZolin Sodium (ANCEF) injection 2 g     ceFAZolin Sodium (ANCEF) injection 2 g     Cleanse skin for procedure     Nozin Nasal  Popswab     Void on call to OR     Notify Provider - Anticoagulants and Antiplatelets     NPO per Anesthesia Guidelines for Procedure/Surgery Except for: Meds     Apply Pneumatic Compression Device (PCD)     Pneumatic Compression Device (PCD) (Equipment)           PLAN:  Reviewed patient's chart in [facility].      4/10/2023   Interpreted radiographs demonstrating unstable ankle fracture with posterior malleolus and lateral malleolus components.  There is shortening of the fibula fracture and a spiral oblique fashion.  Recommend open reduction internal fixation to bring the fibula back out to length and reduced the ankle mortise.  Also to provide stability of the tib-fib syndesmosis and ankle joint ankle mortise.    Recommend nonweightbearing at this time to help reduce discomfort.  Refilled Percocet for her.  She may also take her hydroxyzine in addition to the Percocet but most of her pain should be managed by nonweightbearing.  Recommend compression during the day and keep the ankle fracture boot in place day and night.  Palpable crepitus is noted about the fracture.    4/12/2023  I obtained informed consent to treat Closed fracture of right tibia and fibula, initial encounter [S82.201A, S82.401A] with treatment of Procedure(s):    OPEN REDUCTION  INTERNAL FIXATION right ankle.  I discussed with the patient potential risks and complications as well as alternative treatment options and post op care requirements.   The patients clinical exam and indications are unchanged.  I answered all questions for the patient.  No guarantees were given or implied.  They wish to proceed with surgical treatment.  They have been NPO for 8 hours or more.  No contraindications to surgical treatment at this time.          Bruce Galicia DPM        Please schedule for surgery, pre op H&P, and post ops.    Surgery:  Patient Name:  Lorna Gorman ([id number])  Procedure:   Open reduction internal fixation right ankle   Diagnosis:    Right ankle fracture  Assistant: first assist  Surgeon:  Bruce Galicia DPM  Anesthesia:  General and Post op popliteal block  PT type:  Same Day Surgery  Time needed: 90 minutes  Patient position:  lying supine  Mini fluoro:  Yes  C-arm:  no  Equipment:  [employer] ankle fracture   Anticoagulation:  No  Vendor:  Request equipment rep with [employer] be present for this case, office [phone].   Surgeon Notes: Retired     Post op appts:    5-7 days po  12-15 days po  approx 4 weeks  approx 8 weeks    Expected work restrictions:  no weight bearing in splint until the incision is healed and then protected weightbearing in a fracture boot until 6 weeks postop    [facility] Discussed:  NO    Urgency to Schedule: No later than 4/14/2023, sooner is better.

## 2023-04-13 NOTE — OP NOTE
Procedure Date: 04/12/2023    SURGEON:  Bruce Galicia DPM    ASSISTANT:  Karina Munoz PA-C  assistance needed for positioning, retraction for visualization, bleeding control as needed, and closure    PREOPERATIVE DIAGNOSIS:  Ankle fracture, right ankle.    POSTOPERATIVE DIAGNOSIS:  Ankle fracture, right ankle.    PLANNED PROCEDURE:  Open reduction and internal fixation of right ankle with syndesmosis repair.    DESCRIPTION OF PROCEDURE:    Karina Munoz PA-C assistance needed for positioning, retraction for visualization, bleeding control as needed, and closure      In the preoperative holding area, informed consent was obtained.  We discussed potential risks, complications and alternative treatment options.  No guarantees were given or implied.  No contraindications were noted to the procedure.  She wishes to proceed with the procedure, open reduction and internal fixation of the right ankle.  She was brought into the operating room and placed on the operating table in the supine position.  She was given general anesthesia by the anesthesia department.  A well-padded right tourniquet was applied.  The right foot was prepped and draped in the usual sterile fashion.  Timeout was performed.  The right extremity was exsanguinated with an Esmarch and the right thigh cuff was inflated to 350 mmHg.  A 15 blade was then utilized to make a 6 cm incision over the lateral distal fibula directly full-thickness to bone.  Small areas of bleeding were cauterized utilizing Bovie.  Pierre retractors were utilized for retraction.  The periosteum was released from the fibula with a #15 blade and periosteal elevator.  The fracture was identified and noted to be displaced laterally and shortened proximally about the distal fragment.  It was comminuted with an anterior fragment.  The fracture was then cleansed with copious amounts of sterile saline and a curette to remove all hematoma from the fracture site.  The ankle was distracted and  mechanism of injury was reversed. An alligator clamp was carefully utilized to toggle the length back to exact anatomical alignment. Due to the comminuted nature about the fragment, I was unable to place an interfragmentary compression screw as there was a small comminuted piece.  I attempted to place an interfragmentary compression screw; however, upon placing it, the screw continued to spin, not biting adequately.  Therefore, it was removed.  The 4-hole Arthrex plate was then placed laterally.  Four screws were placed distally and 3 screws bicortical were placed proximally.  At this time, under live fluoroscan, the ankle was stressed and there was felt to be possibly subtle diastasis.  Therefore, a TightRope was applied from lateral to medial.  A small medial incision 1 cm in length was placed to confirm the button lies down flat when flipping the button and to reduce the risk of nerve entrapment. Under live fluoroscan again, the ankle was stressed and no further diastasis was noted.  This appeared to be reduced with the TightRope.  The surgical site was then flushed with copious amounts of sterile saline.  Deep soft tissue structures were closed with an 0 Vicryl.  Skin was closed with a 3-0 Vicryl and a 4-0 Prolene and 20 mL of 1% lidocaine plain were injected about the surgical site to achieve local anesthesia.  Tourniquet was released after approximately 70 minutes, followed by immediate hyperemia.  Posterior splint, nonadherent dressing were applied and Anesthesia provided a popliteal block.  The patient tolerated the procedure and anesthesia well and was brought back to the recovery room with vital signs stable and vascular status intact to the right lower extremity.    Bruce Galicia DPM        D: 2023   T: 2023   MT: [id number]    Name:     LAUREN PAREKH  MRN:      [id number]        Account:        [id number]   :      1965           Procedure Date: 2023     Document:  [id number]

## 2023-04-18 ENCOUNTER — ALLIED HEALTH/NURSE VISIT (OUTPATIENT)
Dept: FAMILY MEDICINE | Facility: CLINIC | Age: 58
End: 2023-04-18
Payer: COMMERCIAL

## 2023-04-18 VITALS
OXYGEN SATURATION: 100 % | HEART RATE: 98 BPM | TEMPERATURE: 97.7 F | SYSTOLIC BLOOD PRESSURE: 158 MMHG | RESPIRATION RATE: 18 BRPM | DIASTOLIC BLOOD PRESSURE: 92 MMHG

## 2023-04-18 DIAGNOSIS — Z48.01 ENCOUNTER FOR CHANGE OR REMOVAL OF SURGICAL WOUND DRESSING: Primary | ICD-10-CM

## 2023-04-18 PROCEDURE — 99207 PR NO CHARGE NURSE ONLY: CPT

## 2023-04-18 ASSESSMENT — PAIN SCALES - GENERAL: PAINLEVEL: MILD PAIN (3)

## 2023-04-18 NOTE — PROGRESS NOTES
Lorna GOLD Gorman is here today for a post-operative dressing change per the order of Dr. Bruce Galicia.  The patient had an open reduction internal fixation of right ankle  on 4/12.  The original post-operative dressing is clean, dry, and intact.  The dressing was removed and the foot/leg was cleansed with soap and water.     The patient reports that they are not using pain medication other than Ibuprofen. They do not need a refill today.  Patient reporting their pain is on average a 3/10.      BP (!) 158/92   Pulse 98   Temp 97.7  F (36.5  C)   Resp 18   SpO2 100%     The incision was cleansed with Microklenz.  The incision appears to be clean, dry, and intact. There is minimal swelling and bruising.  There is minimal old drainage.    The foot/leg was dressed with an adaptic over the incision, shingled guaze, kerlix, webril, post-op posterior splint, and ace wraps.      The patient was educated about the sign and symptoms that would warrant calling or seeking emergent medical care.  The patient was also re-educated about the importance of resting, ice, elevation and the compression dressing. The patient expressed understanding.     The patient has an appointment set up with Dr. Galicia next week.      Leah Green RN   [facility]

## 2023-04-20 ENCOUNTER — TELEPHONE (OUTPATIENT)
Dept: PODIATRY | Facility: CLINIC | Age: 58
End: 2023-04-20
Payer: COMMERCIAL

## 2023-04-20 NOTE — TELEPHONE ENCOUNTER
Spoke to patient, advised her that she should leave her dressing/splint on, continue elevation, non weightbearing and minimize activity until her next post op appointment 4/26/2023. Especially since it is raining and stated she is having difficulty ambulating with her crutches. She was disappointed but appreciated the call back and will continue with her current care. Jeanette Baeza CMA, April 20, 2023

## 2023-04-26 ENCOUNTER — OFFICE VISIT (OUTPATIENT)
Dept: PODIATRY | Facility: CLINIC | Age: 58
End: 2023-04-26
Payer: COMMERCIAL

## 2023-04-26 VITALS
HEIGHT: 66 IN | TEMPERATURE: 98.8 F | BODY MASS INDEX: 21.69 KG/M2 | SYSTOLIC BLOOD PRESSURE: 100 MMHG | DIASTOLIC BLOOD PRESSURE: 64 MMHG | WEIGHT: 135 LBS

## 2023-04-26 DIAGNOSIS — S82.401A CLOSED FRACTURE OF RIGHT TIBIA AND FIBULA, INITIAL ENCOUNTER: Primary | ICD-10-CM

## 2023-04-26 DIAGNOSIS — S82.201A CLOSED FRACTURE OF RIGHT TIBIA AND FIBULA, INITIAL ENCOUNTER: Primary | ICD-10-CM

## 2023-04-26 PROCEDURE — 99024 POSTOP FOLLOW-UP VISIT: CPT | Performed by: PODIATRIST

## 2023-04-26 ASSESSMENT — PAIN SCALES - GENERAL: PAINLEVEL: NO PAIN (0)

## 2023-04-26 NOTE — PROGRESS NOTES
"Chief Complaint   Patient presents with     Surgical Followup     DOS: 4/12/2023~Open reduction and internal fixation of right ankle with syndesmosis repair~14 days postop-has been walking on it       HPI:  Lorna Gorman is a 57 year old female who is seen in consultation at the request of Abelino Franks PA-C    Pt presents for eval of:   (Onset, Location, L/R, Character, Treatments, Injury if yes)    XR Right ankle 3/30/2023     Onset 3/29/2023, while walking, \"felt and heard ankle break\". Presents with lateral Right ankle and anterior Right lower leg pain, WB w/tall gray fx boot and karly friend, Juwan.  Constant, swelling, bruising, dull ache, sharp, stabbing, burning, warm, pain 2-10/10 worse with walking.    Oxycodone, ibuprofen, rest, elevation, wearing fx boot for daily activity only.    Retired .    History of osteopenia and bilateral wrist fractures after a fall on ice.    ORIF 4/12/23     ROS:  10 point ROS neg other than the symptoms noted above in the HPI.    Patient Active Problem List   Diagnosis     Tobacco abuse disorder     CARDIOVASCULAR SCREENING; LDL GOAL LESS THAN 160     TITA (generalized anxiety disorder)     Benign essential hypertension     Hypertension goal BP (blood pressure) < 140/90     Dyshydrosis     ASCUS with positive high risk HPV cervical     Family history of colon cancer       PAST MEDICAL HISTORY:   Past Medical History:   Diagnosis Date     Abnormal Pap smear of cervix 01/22/2020    see problem list     Benign essential hypertension 05/18/2017     GENERALIZED ANXIETY DIS 05/15/2007     Hyperlipidemia with target LDL less than 130 11/06/2013     Hypertension goal BP (blood pressure) < 140/90 05/18/2017     Major depressive disorder, recurrent episode, mild (H) 12/09/2015     Migraine, unspecified, without mention of intractable migraine without mention of status migrainosus      Moderate Depression [296.32] 08/19/2009     Premenstrual tension syndromes     PMDS " on Sarafem and doing very well.     Tobacco abuse 11/06/2013        PAST SURGICAL HISTORY:   Past Surgical History:   Procedure Laterality Date     COLONOSCOPY N/A 2/9/2015    Procedure: COLONOSCOPY;  Surgeon: Ritesh Chiu MD;  Location:  GI      HYSTEROSCOPY, SURGICAL; W/ ENDOMETRIAL ABLATION, ANY METHOD  12/18/09     OPEN REDUCTION INTERNAL FIXATION ANKLE Right 4/12/2023    Procedure: OPEN REDUCTION INTERNAL FIXATION right ankle;  Surgeon: Bruce Galicia DPM;  Location:  OR     [facility] ANESTH,NOSE,SINUS SURGERY  2000     [facility] RESEC HEAD OF PHALANX,TOE  2003        MEDICATIONS:   Current Outpatient Medications:      alendronate (FOSAMAX) 35 MG tablet, Take 1 tablet (35 mg) by mouth every 7 days, Disp: 12 tablet, Rfl: 3     atorvastatin (LIPITOR) 10 MG tablet, TAKE 1 TABLET (10 MG) BY MOUTH DAILY, Disp: 90 tablet, Rfl: 1     losartan (COZAAR) 100 MG tablet, Take 1 tablet (100 mg) by mouth daily, Disp: 90 tablet, Rfl: 3     sertraline (ZOLOFT) 100 MG tablet, Take 1 tablet by mouth daily, Disp: , Rfl:      hydrOXYzine (ATARAX) 10 MG tablet, Take 10 mg up to every 6 hours for any anxiety or panic feelings, may take 20-50 mg at bedtime for insomnia as needed (Patient not taking: Reported on 4/26/2023), Disp: 90 tablet, Rfl: 3     oxyCODONE (ROXICODONE) 5 MG tablet, Take 1 tablet (5 mg) by mouth every 6 hours as needed for pain (Patient not taking: Reported on 4/18/2023), Disp: 20 tablet, Rfl: 0     ALLERGIES:    Allergies   Allergen Reactions     No Known Allergies      Ragweeds         SOCIAL HISTORY:   Social History     Socioeconomic History     Marital status:      Spouse name: Delbert     Number of children: 3     Years of education: 15     Highest education level: Not on file   Occupational History     Occupation:      Employer: [employer]   Tobacco Use     Smoking status: Former     Packs/day: 0.00     Years: 7.00     Pack years: 0.00     Types: Cigarettes     Start date: 1/1/2010      Quit date: 2021     Years since quittin.6     Smokeless tobacco: Never     Tobacco comments:     I QUIT!!   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 Yes   Social History Narrative     from , live separately for years. Has male friend     Social Determinants of Health     Financial Resource Strain: Low Risk  (2022)    Overall Financial Resource Strain (CARDIA)      Difficulty of Paying Living Expenses: Not hard at all   Food Insecurity: No Food Insecurity (2022)    Hunger Vital Sign      Worried About Running Out of Food in the Last Year: Never true      Ran Out of Food in the Last Year: Never true   Transportation Needs: No Transportation Needs (2022)    PRAPARE - Transportation      Lack of Transportation (Medical): No      Lack of Transportation (Non-Medical): No   Physical Activity: Inactive (2022)    Exercise Vital Sign      Days of Exercise per Week: 0 days      Minutes of Exercise per Session: 0 min   Stress: Stress Concern Present (2022)    [employer] [place] [employer] - Occupational Stress Questionnaire      Feeling of Stress : Rather much   Social Connections: Not on file  "  Intimate Partner Violence: Not on file   Housing Stability: Not on file        FAMILY HISTORY:   Family History   Problem Relation Age of Onset     Myocardial Infarction Father 40         at age 40     Hypertension Father          at 40.  Heart Attack     Lipids Father      C.A.D. Father          at age 40 from an MI     Arthritis Mother      Eye Disorder Mother         glaucoma     Genitourinary Problems Mother         hysterectomy     Hypertension Mother      Colon Cancer Mother 80     Liver Cancer Mother 80     Hyperlipidemia Mother      Cardiovascular Maternal Grandmother      Eye Disorder Maternal Grandmother         cataracts     Heart Disease Maternal Grandmother      Hypertension Maternal Grandmother      Cardiovascular Maternal Grandfather      Hypertension Maternal Grandfather      Cardiovascular Paternal Grandmother      Hypertension Paternal Grandmother      Cardiovascular Paternal Grandfather      Hypertension Paternal Grandfather      Musculoskeletal Disorder Paternal Uncle         MS     Heart Failure Maternal Aunt 85        EXAM:Vitals: /64   Temp 98.8  F (37.1  C) (Temporal)   Ht 1.676 m (5' 6\")   Wt 61.2 kg (135 lb)   BMI 21.79 kg/m    BMI= Body mass index is 21.79 kg/m .    General appearance: Patient is alert and fully cooperative with history & exam.  No sign of distress is noted during the visit.     Psychiatric: Affect is pleasant & appropriate.  Patient appears motivated to improve health.     Respiratory: Breathing is regular & unlabored while sitting.     HEENT: Hearing is intact to spoken word.  Speech is clear.  No gross evidence of visual impairment that would impact ambulation.     Vascular: DP & PT pulses are intact & regular bilaterally.  No significant edema or varicosities noted.  CFT and skin temperature is normal to both lower extremities.     Neurologic: Lower extremity sensation is intact to light touch.  No evidence of weakness or contracture in the " lower extremities.  No evidence of neuropathy.    Dermatologic: Skin is intact to both lower extremities with adequate texture, turgor and tone about the integument.  No paronychia or evidence of soft tissue infection is noted.     Musculoskeletal: Patient is ambulatory with fracture boot about the right lower extremity.  Considerable soft edema is noted surrounding the right ankle with crepitus laterally with any movement of the ankle.      Radiographs 3 views right ankle 3/30/2023 demonstrate displaced fracture starting at the joint line moving proximal and a spiral oblique fashion with posterior and lateral displacement.  There is shortening of the fibula.  Also medial malleolus fracture noted.    DEXA scan 11/21 demonstrates   right hip -2.1   left hip -1.4   lumbar spine -1.6    ASSESSMENT:       ICD-10-CM    1. Closed fracture of right tibia and fibula, initial encounter  S82.201A     S82.401A            PLAN:  Reviewed [facility] in [facility].      4/10/2023   Interpreted radiographs demonstrating unstable ankle fracture with posterior malleolus and lateral malleolus components.  There is shortening of the fibula fracture and a spiral oblique fashion.  Recommend open reduction internal fixation to bring the fibula back out to length and reduced the ankle mortise.  Also to provide stability of the tib-fib syndesmosis and ankle joint ankle mortise.    Recommend nonweightbearing at this time to help reduce discomfort.  Refilled Percocet for her.  She may also take her hydroxyzine in addition to the Percocet but most of her pain should be managed by nonweightbearing.  Recommend compression during the day and keep the ankle fracture boot in place day and night.  Palpable crepitus is noted about the fracture.    4/26/2023  All sutures were removed today.  Compression dressing applied  Placed back in a fracture boot  Can begin short periods of weightbearing with a fracture boot 15 minutes per 60 minutes.  Remain in  the fracture boot even during rest and sleep and follow-up in 2 weeks.  Can begin bathing but no soaking or submersion.  She has discontinued pain medication      Bruce Galicia DPM        Please schedule for surgery, pre op H&P, and post ops.    Surgery:  Patient Name:  Lorna Gorman ([id number])  Procedure:   Open reduction internal fixation right ankle   Diagnosis:    Right ankle fracture  Assistant: first assist  Surgeon:  Bruce Galicia DPM  Anesthesia:  General and Post op popliteal block  PT type:  Same Day Surgery  Time needed: 90 minutes  Patient position:  lying supine  Mini fluoro:  Yes  C-arm:  no  Equipment:  arthrex ankle fracture   Anticoagulation:  No  Vendor:  Request equipment rep with [employer] be present for this case, office [phone].   Surgeon Notes: Retired     Post op appts:    5-7 days po  12-15 days po  approx 4 weeks  approx 8 weeks    Expected work restrictions:  no weight bearing in splint until the incision is healed and then protected weightbearing in a fracture boot until 6 weeks postop    [facility] Discussed:  NO    Urgency to Schedule: No later than 4/14/2023, sooner is better.

## 2023-04-26 NOTE — LETTER
"    4/26/2023         RE: Lorna Gorman  [address] [facility] [address]        Dear Colleague,    Thank you for referring your patient, Lorna Gorman, to the [facility]. Please see a copy of my visit note below.    Chief Complaint   Patient presents with     Surgical Followup     DOS: 4/12/2023~Open reduction and internal fixation of right ankle with syndesmosis repair~14 days postop-has been walking on it       HPI:  Lorna Gorman is a 57 year old female who is seen in consultation at the request of Abelino Franks PA-C    Pt presents for eval of:   (Onset, Location, L/R, Character, Treatments, Injury if yes)    XR Right ankle 3/30/2023     Onset 3/29/2023, while walking, \"felt and heard ankle break\". Presents with lateral Right ankle and anterior Right lower leg pain, WB w/tall gray fx boot and karly friend, Juwan.  Constant, swelling, bruising, dull ache, sharp, stabbing, burning, warm, pain 2-10/10 worse with walking.    Oxycodone, ibuprofen, rest, elevation, wearing fx boot for daily activity only.    Retired .    History of osteopenia and bilateral wrist fractures after a fall on ice.    ORIF 4/12/23     ROS:  10 point ROS neg other than the symptoms noted above in the HPI.    Patient Active Problem List   Diagnosis     Tobacco abuse disorder     CARDIOVASCULAR SCREENING; LDL GOAL LESS THAN 160     TITA (generalized anxiety disorder)     Benign essential hypertension     Hypertension goal BP (blood pressure) < 140/90     Dyshydrosis     ASCUS with positive high risk HPV cervical     Family history of colon cancer       PAST MEDICAL HISTORY:   Past Medical History:   Diagnosis Date     Abnormal Pap smear of cervix 01/22/2020    see problem list     Benign essential hypertension 05/18/2017     GENERALIZED ANXIETY DIS 05/15/2007     Hyperlipidemia with target LDL less than 130 11/06/2013     Hypertension goal BP (blood pressure) < 140/90 05/18/2017     Major " depressive disorder, recurrent episode, mild (H) 12/09/2015     Migraine, unspecified, without mention of intractable migraine without mention of status migrainosus      Moderate Depression [296.32] 08/19/2009     Premenstrual tension syndromes     PMDS on Sarafem and doing very well.     Tobacco abuse 11/06/2013        PAST SURGICAL HISTORY:   Past Surgical History:   Procedure Laterality Date     COLONOSCOPY N/A 2/9/2015    Procedure: COLONOSCOPY;  Surgeon: Ritesh Chiu MD;  Location:  GI      HYSTEROSCOPY, SURGICAL; W/ ENDOMETRIAL ABLATION, ANY METHOD  12/18/09     OPEN REDUCTION INTERNAL FIXATION ANKLE Right 4/12/2023    Procedure: OPEN REDUCTION INTERNAL FIXATION right ankle;  Surgeon: Bruce Galicia DPM;  Location:  OR     [facility] ANESTH,NOSE,SINUS SURGERY  2000     [facility] RESEC HEAD OF PHALANX,TOE  2003        MEDICATIONS:   Current Outpatient Medications:      alendronate (FOSAMAX) 35 MG tablet, Take 1 tablet (35 mg) by mouth every 7 days, Disp: 12 tablet, Rfl: 3     atorvastatin (LIPITOR) 10 MG tablet, TAKE 1 TABLET (10 MG) BY MOUTH DAILY, Disp: 90 tablet, Rfl: 1     losartan (COZAAR) 100 MG tablet, Take 1 tablet (100 mg) by mouth daily, Disp: 90 tablet, Rfl: 3     sertraline (ZOLOFT) 100 MG tablet, Take 1 tablet by mouth daily, Disp: , Rfl:      hydrOXYzine (ATARAX) 10 MG tablet, Take 10 mg up to every 6 hours for any anxiety or panic feelings, may take 20-50 mg at bedtime for insomnia as needed (Patient not taking: Reported on 4/26/2023), Disp: 90 tablet, Rfl: 3     oxyCODONE (ROXICODONE) 5 MG tablet, Take 1 tablet (5 mg) by mouth every 6 hours as needed for pain (Patient not taking: Reported on 4/18/2023), Disp: 20 tablet, Rfl: 0     ALLERGIES:    Allergies   Allergen Reactions     No Known Allergies      Ragweeds         SOCIAL HISTORY:   Social History     Socioeconomic History     Marital status:      Spouse name: Delbert     Number of children: 3     Years of education: 15     Highest  education level: Not on file   Occupational History     Occupation:      Employer: [employer]   Tobacco Use     Smoking status: Former     Packs/day: 0.00     Years: 7.00     Pack years: 0.00     Types: Cigarettes     Start date: 2010     Quit date: 2021     Years since quittin.6     Smokeless tobacco: Never     Tobacco comments:     I QUIT!!   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 Yes   Social History Narrative     from , live separately for years. Has male friend     Social Determinants of Health     Financial Resource Strain: Low Risk  (2022)    Overall Financial Resource Strain (CARDIA)      Difficulty of Paying Living Expenses: Not hard at all   Food Insecurity: No Food Insecurity (2022)    Hunger Vital Sign      Worried About Running Out of Food in the Last Year: Never true      Ran Out of Food in the Last Year: Never true   Transportation Needs: No Transportation Needs (2022)    PRAPARE - Transportation      Lack of Transportation (Medical): No      Lack of Transportation (Non-Medical): No   Physical Activity: Inactive (2022)    Exercise Vital Sign      Days of  "Exercise per Week: 0 days      Minutes of Exercise per Session: 0 min   Stress: Stress Concern Present (2022)    [place] [place] [employer] - Occupational Stress Questionnaire      Feeling of Stress : Rather much   Social Connections: Not on file   Intimate Partner Violence: Not on file   Housing Stability: Not on file        FAMILY HISTORY:   Family History   Problem Relation Age of Onset     Myocardial Infarction Father 40         at age 40     Hypertension Father          at 40.  Heart Attack     Lipids Father      C.A.D. Father          at age 40 from an MI     Arthritis Mother      Eye Disorder Mother         glaucoma     Genitourinary Problems Mother         hysterectomy     Hypertension Mother      Colon Cancer Mother 80     Liver Cancer Mother 80     Hyperlipidemia Mother      Cardiovascular Maternal Grandmother      Eye Disorder Maternal Grandmother         cataracts     Heart Disease Maternal Grandmother      Hypertension Maternal Grandmother      Cardiovascular Maternal Grandfather      Hypertension Maternal Grandfather      Cardiovascular Paternal Grandmother      Hypertension Paternal Grandmother      Cardiovascular Paternal Grandfather      Hypertension Paternal Grandfather      Musculoskeletal Disorder Paternal Uncle         MS     Heart Failure Maternal Aunt 85        EXAM:Vitals: /64   Temp 98.8  F (37.1  C) (Temporal)   Ht 1.676 m (5' 6\")   Wt 61.2 kg (135 lb)   BMI 21.79 kg/m    BMI= Body mass index is 21.79 kg/m .    General appearance: Patient is alert and fully cooperative with history & exam.  No sign of distress is noted during the visit.     Psychiatric: Affect is pleasant & appropriate.  Patient appears motivated to improve health.     Respiratory: Breathing is regular & unlabored while sitting.     HEENT: Hearing is intact to spoken word.  Speech is clear.  No gross evidence of visual impairment that would impact ambulation.     Vascular: DP & PT " pulses are intact & regular bilaterally.  No significant edema or varicosities noted.  CFT and skin temperature is normal to both lower extremities.     Neurologic: Lower extremity sensation is intact to light touch.  No evidence of weakness or contracture in the lower extremities.  No evidence of neuropathy.    Dermatologic: Skin is intact to both lower extremities with adequate texture, turgor and tone about the integument.  No paronychia or evidence of soft tissue infection is noted.     Musculoskeletal: Patient is ambulatory with fracture boot about the right lower extremity.  Considerable soft edema is noted surrounding the right ankle with crepitus laterally with any movement of the ankle.      Radiographs 3 views right ankle 3/30/2023 demonstrate displaced fracture starting at the joint line moving proximal and a spiral oblique fashion with posterior and lateral displacement.  There is shortening of the fibula.  Also medial malleolus fracture noted.    DEXA scan 11/21 demonstrates   right hip -2.1   left hip -1.4   lumbar spine -1.6    ASSESSMENT:       ICD-10-CM    1. Closed fracture of right tibia and fibula, initial encounter  S82.201A     S82.401A            PLAN:  Reviewed patient's chart in [facility].      4/10/2023   Interpreted radiographs demonstrating unstable ankle fracture with posterior malleolus and lateral malleolus components.  There is shortening of the fibula fracture and a spiral oblique fashion.  Recommend open reduction internal fixation to bring the fibula back out to length and reduced the ankle mortise.  Also to provide stability of the tib-fib syndesmosis and ankle joint ankle mortise.    Recommend nonweightbearing at this time to help reduce discomfort.  Refilled Percocet for her.  She may also take her hydroxyzine in addition to the Percocet but most of her pain should be managed by nonweightbearing.  Recommend compression during the day and keep the ankle fracture boot in place day and  night.  Palpable crepitus is noted about the fracture.    4/26/2023  All sutures were removed today.  Compression dressing applied  Placed back in a fracture boot  Can begin short periods of weightbearing with a fracture boot 15 minutes per 60 minutes.  Remain in the fracture boot even during rest and sleep and follow-up in 2 weeks.  Can begin bathing but no soaking or submersion.  She has discontinued pain medication      Bruce Galicia DPM        Please schedule for surgery, pre op H&P, and post ops.    Surgery:  Patient Name:  Lorna Gorman ([id number])  Procedure:   Open reduction internal fixation right ankle   Diagnosis:    Right ankle fracture  Assistant: first assist  Surgeon:  Bruce Galicia DPM  Anesthesia:  General and Post op popliteal block  PT type:  Same Day Surgery  Time needed: 90 minutes  Patient position:  lying supine  Mini fluoro:  Yes  C-arm:  no  Equipment:  [employer] ankle fracture   Anticoagulation:  No  Vendor:  Request equipment rep with [employer] be present for this case, office [phone].   Surgeon Notes: Retired     Post op appts:    5-7 days po  12-15 days po  approx 4 weeks  approx 8 weeks    Expected work restrictions:  no weight bearing in splint until the incision is healed and then protected weightbearing in a fracture boot until 6 weeks postop    [facility] Discussed:  NO    Urgency to Schedule: No later than 4/14/2023, sooner is better.                    Again, thank you for allowing me to participate in the care of your patient.        Sincerely,        Bruce Galicia DPM

## 2023-05-01 ENCOUNTER — DOCUMENTATION ONLY (OUTPATIENT)
Dept: PODIATRY | Facility: CLINIC | Age: 58
End: 2023-05-01
Payer: COMMERCIAL

## 2023-05-01 DIAGNOSIS — R26.89 IMPAIRED GAIT AND MOBILITY: Primary | ICD-10-CM

## 2023-05-10 ENCOUNTER — OFFICE VISIT (OUTPATIENT)
Dept: PODIATRY | Facility: CLINIC | Age: 58
End: 2023-05-10
Payer: COMMERCIAL

## 2023-05-10 VITALS
SYSTOLIC BLOOD PRESSURE: 118 MMHG | HEIGHT: 66 IN | DIASTOLIC BLOOD PRESSURE: 72 MMHG | BODY MASS INDEX: 21.69 KG/M2 | WEIGHT: 135 LBS

## 2023-05-10 DIAGNOSIS — S82.201A CLOSED FRACTURE OF RIGHT TIBIA AND FIBULA, INITIAL ENCOUNTER: Primary | ICD-10-CM

## 2023-05-10 DIAGNOSIS — S82.401A CLOSED FRACTURE OF RIGHT TIBIA AND FIBULA, INITIAL ENCOUNTER: Primary | ICD-10-CM

## 2023-05-10 PROCEDURE — 99024 POSTOP FOLLOW-UP VISIT: CPT | Performed by: PODIATRIST

## 2023-05-10 ASSESSMENT — PAIN SCALES - GENERAL: PAINLEVEL: NO PAIN (1)

## 2023-05-10 NOTE — LETTER
"    5/10/2023         RE: Lorna Gorman  [address] [facility] [address]        Dear Colleague,    Thank you for referring your patient, Lorna Gorman, to the [facility]. Please see a copy of my visit note below.    Chief Complaint   Patient presents with     Surgical Followup     (4w) WB w/tall gray fx boot; DOS: 4/12/2023~Open reduction and internal fixation of right ankle with syndesmosis repair; LOV 4/26/2023       HPI:  Lorna Gorman is a 57 year old female who is seen in consultation at the request of Abelnio Franks PA-C    Pt presents for eval of:   (Onset, Location, L/R, Character, Treatments, Injury if yes)    XR Right ankle 3/30/2023     Onset 3/29/2023, while walking, \"felt and heard ankle break\". Presents with lateral Right ankle and anterior Right lower leg pain, WB w/tall gray fx boot and karly friend, Juwan.  Constant, swelling, bruising, dull ache, sharp, stabbing, burning, warm, pain 2-10/10 worse with walking.    Oxycodone, ibuprofen, rest, elevation, wearing fx boot for daily activity only.    Retired .    History of osteopenia and bilateral wrist fractures after a fall on ice.    ORIF 4/12/23     ROS:  10 point ROS neg other than the symptoms noted above in the HPI.    Patient Active Problem List   Diagnosis     Tobacco abuse disorder     CARDIOVASCULAR SCREENING; LDL GOAL LESS THAN 160     TITA (generalized anxiety disorder)     Benign essential hypertension     Hypertension goal BP (blood pressure) < 140/90     Dyshydrosis     ASCUS with positive high risk HPV cervical     Family history of colon cancer       PAST MEDICAL HISTORY:   Past Medical History:   Diagnosis Date     Abnormal Pap smear of cervix 01/22/2020    see problem list     Benign essential hypertension 05/18/2017     GENERALIZED ANXIETY DIS 05/15/2007     Hyperlipidemia with target LDL less than 130 11/06/2013     Hypertension goal BP (blood pressure) < 140/90 05/18/2017     Major " depressive disorder, recurrent episode, mild (H) 12/09/2015     Migraine, unspecified, without mention of intractable migraine without mention of status migrainosus      Moderate Depression [296.32] 08/19/2009     Premenstrual tension syndromes     PMDS on Sarafem and doing very well.     Tobacco abuse 11/06/2013        PAST SURGICAL HISTORY:   Past Surgical History:   Procedure Laterality Date     COLONOSCOPY N/A 2/9/2015    Procedure: COLONOSCOPY;  Surgeon: Ritesh Chiu MD;  Location:  GI      HYSTEROSCOPY, SURGICAL; W/ ENDOMETRIAL ABLATION, ANY METHOD  12/18/09     OPEN REDUCTION INTERNAL FIXATION ANKLE Right 4/12/2023    Procedure: OPEN REDUCTION INTERNAL FIXATION right ankle;  Surgeon: Bruce Galicia DPM;  Location:  OR     [facility] ANESTH,NOSE,SINUS SURGERY  2000     [facility] RESEC HEAD OF PHALANX,TOE  2003        MEDICATIONS:   Current Outpatient Medications:      alendronate (FOSAMAX) 35 MG tablet, Take 1 tablet (35 mg) by mouth every 7 days, Disp: 12 tablet, Rfl: 3     atorvastatin (LIPITOR) 10 MG tablet, TAKE 1 TABLET (10 MG) BY MOUTH DAILY, Disp: 90 tablet, Rfl: 1     IBUPROFEN PO, , Disp: , Rfl:      losartan (COZAAR) 100 MG tablet, Take 1 tablet (100 mg) by mouth daily, Disp: 90 tablet, Rfl: 3     sertraline (ZOLOFT) 100 MG tablet, Take 1 tablet by mouth daily, Disp: , Rfl:      hydrOXYzine (ATARAX) 10 MG tablet, Take 10 mg up to every 6 hours for any anxiety or panic feelings, may take 20-50 mg at bedtime for insomnia as needed (Patient not taking: Reported on 4/26/2023), Disp: 90 tablet, Rfl: 3     oxyCODONE (ROXICODONE) 5 MG tablet, Take 1 tablet (5 mg) by mouth every 6 hours as needed for pain (Patient not taking: Reported on 4/18/2023), Disp: 20 tablet, Rfl: 0     ALLERGIES:    Allergies   Allergen Reactions     No Known Allergies      Ragweeds         SOCIAL HISTORY:   Social History     Socioeconomic History     Marital status:      Spouse name: Delbert     Number of children: 3     Years  of education: 15     Highest education level: Not on file   Occupational History     Occupation:      Employer: [employer]   Tobacco Use     Smoking status: Former     Packs/day: 0.00     Years: 7.00     Pack years: 0.00     Types: Cigarettes     Start date: 2010     Quit date: 2021     Years since quittin.6     Smokeless tobacco: Never     Tobacco comments:     I QUIT!!   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 Yes   Social History Narrative     from , live separately for years. Has male friend     Social Determinants of Health     Financial Resource Strain: Low Risk  (2022)    Overall Financial Resource Strain (CARDIA)      Difficulty of Paying Living Expenses: Not hard at all   Food Insecurity: No Food Insecurity (2022)    Hunger Vital Sign      Worried About Running Out of Food in the Last Year: Never true      Ran Out of Food in the Last Year: Never true   Transportation Needs: No Transportation Needs (2022)    PRAPARE - Transportation      Lack of Transportation (Medical): No      Lack of Transportation (Non-Medical): No   Physical Activity: Inactive (2022)    Exercise  "Vital Sign      Days of Exercise per Week: 0 days      Minutes of Exercise per Session: 0 min   Stress: Stress Concern Present (2022)    [employer] [place] [employer] - Occupational Stress Questionnaire      Feeling of Stress : Rather much   Social Connections: Not on file   Intimate Partner Violence: Not on file   Housing Stability: Not on file        FAMILY HISTORY:   Family History   Problem Relation Age of Onset     Myocardial Infarction Father 40         at age 40     Hypertension Father          at 40.  Heart Attack     Lipids Father      C.A.D. Father          at age 40 from an MI     Arthritis Mother      Eye Disorder Mother         glaucoma     Genitourinary Problems Mother         hysterectomy     Hypertension Mother      Colon Cancer Mother 80     Liver Cancer Mother 80     Hyperlipidemia Mother      Cardiovascular Maternal Grandmother      Eye Disorder Maternal Grandmother         cataracts     Heart Disease Maternal Grandmother      Hypertension Maternal Grandmother      Cardiovascular Maternal Grandfather      Hypertension Maternal Grandfather      Cardiovascular Paternal Grandmother      Hypertension Paternal Grandmother      Cardiovascular Paternal Grandfather      Hypertension Paternal Grandfather      Musculoskeletal Disorder Paternal Uncle         MS     Heart Failure Maternal Aunt 85        EXAM:Vitals: /72 (BP Location: Left arm, Patient Position: Sitting, Cuff Size: Adult Regular)   Ht 1.676 m (5' 6\")   Wt 61.2 kg (135 lb)   BMI 21.79 kg/m    BMI= Body mass index is 21.79 kg/m .    General appearance: Patient is alert and fully cooperative with history & exam.  No sign of distress is noted during the visit.     Psychiatric: Affect is pleasant & appropriate.  Patient appears motivated to improve health.     Respiratory: Breathing is regular & unlabored while sitting.     HEENT: Hearing is intact to spoken word.  Speech is clear.  No gross evidence of visual " impairment that would impact ambulation.     Vascular: DP & PT pulses are intact & regular bilaterally.  No significant edema or varicosities noted.  CFT and skin temperature is normal to both lower extremities.     Neurologic: Lower extremity sensation is intact to light touch.  No evidence of weakness or contracture in the lower extremities.  No evidence of neuropathy.    Dermatologic: Skin is intact to both lower extremities with adequate texture, turgor and tone about the integument.  No paronychia or evidence of soft tissue infection is noted.     Musculoskeletal: Patient is ambulatory with fracture boot about the right lower extremity.  Considerable soft edema is noted surrounding the right ankle with crepitus laterally with any movement of the ankle.      Radiographs 3 views right ankle 3/30/2023 demonstrate displaced fracture starting at the joint line moving proximal and a spiral oblique fashion with posterior and lateral displacement.  There is shortening of the fibula.  Also medial malleolus fracture noted.    DEXA scan 11/21 demonstrates   right hip -2.1   left hip -1.4   lumbar spine -1.6    ASSESSMENT:       ICD-10-CM    1. Closed fracture of right tibia and fibula, initial encounter  S82.201A Physical Therapy Referral    S82.401A            PLAN:  Reviewed patient's chart in [facility].      4/10/2023   Interpreted radiographs demonstrating unstable ankle fracture with posterior malleolus and lateral malleolus components.  There is shortening of the fibula fracture and a spiral oblique fashion.  Recommend open reduction internal fixation to bring the fibula back out to length and reduced the ankle mortise.  Also to provide stability of the tib-fib syndesmosis and ankle joint ankle mortise.    Recommend nonweightbearing at this time to help reduce discomfort.  Refilled Percocet for her.  She may also take her hydroxyzine in addition to the Percocet but most of her pain should be managed by nonweightbearing.   Recommend compression during the day and keep the ankle fracture boot in place day and night.  Palpable crepitus is noted about the fracture.    4/26/2023  All sutures were removed today.  Compression dressing applied  Placed back in a fracture boot  Can begin short periods of weightbearing with a fracture boot 15 minutes per 60 minutes.  Remain in the fracture boot even during rest and sleep and follow-up in 2 weeks.  Can begin bathing but no soaking or submersion.  She has discontinued pain medication    5/10/2023  Order to begin physical therapy she would like to do this in [place]  She may come out of the fracture boot for rest and sleep and for very short periods of activity in a sturdy shoe as tolerated but stay in the fracture boot for any significant long periods of standing or walking  Encourage compression sock as tolerated  Follow-up again at 6 weeks postop and repeat imaging at that time likely to progress out of the fracture boot completely.      Bruce Galicia DPM          Again, thank you for allowing me to participate in the care of your patient.        Sincerely,        Bruce Galicia DPM

## 2023-05-10 NOTE — PROGRESS NOTES
"Chief Complaint   Patient presents with     Surgical Followup     (4w) WB w/tall gray fx boot; DOS: 4/12/2023~Open reduction and internal fixation of right ankle with syndesmosis repair; LOV 4/26/2023       HPI:  Lorna Gorman is a 57 year old female who is seen in consultation at the request of Abelino Franks PA-C    Pt presents for eval of:   (Onset, Location, L/R, Character, Treatments, Injury if yes)    XR Right ankle 3/30/2023     Onset 3/29/2023, while walking, \"felt and heard ankle break\". Presents with lateral Right ankle and anterior Right lower leg pain, WB w/tall gray fx boot and karly friend, Juwan.  Constant, swelling, bruising, dull ache, sharp, stabbing, burning, warm, pain 2-10/10 worse with walking.    Oxycodone, ibuprofen, rest, elevation, wearing fx boot for daily activity only.    Retired .    History of osteopenia and bilateral wrist fractures after a fall on ice.    ORIF 4/12/23     ROS:  10 point ROS neg other than the symptoms noted above in the HPI.    Patient Active Problem List   Diagnosis     Tobacco abuse disorder     CARDIOVASCULAR SCREENING; LDL GOAL LESS THAN 160     TITA (generalized anxiety disorder)     Benign essential hypertension     Hypertension goal BP (blood pressure) < 140/90     Dyshydrosis     ASCUS with positive high risk HPV cervical     Family history of colon cancer       PAST MEDICAL HISTORY:   Past Medical History:   Diagnosis Date     Abnormal Pap smear of cervix 01/22/2020    see problem list     Benign essential hypertension 05/18/2017     GENERALIZED ANXIETY DIS 05/15/2007     Hyperlipidemia with target LDL less than 130 11/06/2013     Hypertension goal BP (blood pressure) < 140/90 05/18/2017     Major depressive disorder, recurrent episode, mild (H) 12/09/2015     Migraine, unspecified, without mention of intractable migraine without mention of status migrainosus      Moderate Depression [296.32] 08/19/2009     Premenstrual tension syndromes     " PMDS on Sarafem and doing very well.     Tobacco abuse 11/06/2013        PAST SURGICAL HISTORY:   Past Surgical History:   Procedure Laterality Date     COLONOSCOPY N/A 2/9/2015    Procedure: COLONOSCOPY;  Surgeon: Ritesh Chiu MD;  Location:  GI      HYSTEROSCOPY, SURGICAL; W/ ENDOMETRIAL ABLATION, ANY METHOD  12/18/09     OPEN REDUCTION INTERNAL FIXATION ANKLE Right 4/12/2023    Procedure: OPEN REDUCTION INTERNAL FIXATION right ankle;  Surgeon: Bruce Galicia DPM;  Location:  OR     [facility] ANESTH,NOSE,SINUS SURGERY  2000     [facility] RESEC HEAD OF PHALANX,TOE  2003        MEDICATIONS:   Current Outpatient Medications:      alendronate (FOSAMAX) 35 MG tablet, Take 1 tablet (35 mg) by mouth every 7 days, Disp: 12 tablet, Rfl: 3     atorvastatin (LIPITOR) 10 MG tablet, TAKE 1 TABLET (10 MG) BY MOUTH DAILY, Disp: 90 tablet, Rfl: 1     IBUPROFEN PO, , Disp: , Rfl:      losartan (COZAAR) 100 MG tablet, Take 1 tablet (100 mg) by mouth daily, Disp: 90 tablet, Rfl: 3     sertraline (ZOLOFT) 100 MG tablet, Take 1 tablet by mouth daily, Disp: , Rfl:      hydrOXYzine (ATARAX) 10 MG tablet, Take 10 mg up to every 6 hours for any anxiety or panic feelings, may take 20-50 mg at bedtime for insomnia as needed (Patient not taking: Reported on 4/26/2023), Disp: 90 tablet, Rfl: 3     oxyCODONE (ROXICODONE) 5 MG tablet, Take 1 tablet (5 mg) by mouth every 6 hours as needed for pain (Patient not taking: Reported on 4/18/2023), Disp: 20 tablet, Rfl: 0     ALLERGIES:    Allergies   Allergen Reactions     No Known Allergies      Ragweeds         SOCIAL HISTORY:   Social History     Socioeconomic History     Marital status:      Spouse name: Delbert     Number of children: 3     Years of education: 15     Highest education level: Not on file   Occupational History     Occupation:      Employer: [employer]   Tobacco Use     Smoking status: Former     Packs/day: 0.00     Years: 7.00     Pack years: 0.00      Types: Cigarettes     Start date: 2010     Quit date: 2021     Years since quittin.6     Smokeless tobacco: Never     Tobacco comments:     I QUIT!!   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 Yes   Social History Narrative     from , live separately for years. Has male friend     Social Determinants of Health     Financial Resource Strain: Low Risk  (2022)    Overall Financial Resource Strain (CARDIA)      Difficulty of Paying Living Expenses: Not hard at all   Food Insecurity: No Food Insecurity (2022)    Hunger Vital Sign      Worried About Running Out of Food in the Last Year: Never true      Ran Out of Food in the Last Year: Never true   Transportation Needs: No Transportation Needs (2022)    PRAPARE - Transportation      Lack of Transportation (Medical): No      Lack of Transportation (Non-Medical): No   Physical Activity: Inactive (2022)    Exercise Vital Sign      Days of Exercise per Week: 0 days      Minutes of Exercise per Session: 0 min   Stress: Stress Concern Present (2022)    [employer] [place] [employer] - Occupational Stress Questionnaire      Feeling of Stress :  "Rather much   Social Connections: Not on file   Intimate Partner Violence: Not on file   Housing Stability: Not on file        FAMILY HISTORY:   Family History   Problem Relation Age of Onset     Myocardial Infarction Father 40         at age 40     Hypertension Father          at 40.  Heart Attack     Lipids Father      C.A.D. Father          at age 40 from an MI     Arthritis Mother      Eye Disorder Mother         glaucoma     Genitourinary Problems Mother         hysterectomy     Hypertension Mother      Colon Cancer Mother 80     Liver Cancer Mother 80     Hyperlipidemia Mother      Cardiovascular Maternal Grandmother      Eye Disorder Maternal Grandmother         cataracts     Heart Disease Maternal Grandmother      Hypertension Maternal Grandmother      Cardiovascular Maternal Grandfather      Hypertension Maternal Grandfather      Cardiovascular Paternal Grandmother      Hypertension Paternal Grandmother      Cardiovascular Paternal Grandfather      Hypertension Paternal Grandfather      Musculoskeletal Disorder Paternal Uncle         MS     Heart Failure Maternal Aunt 85        EXAM:Vitals: /72 (BP Location: Left arm, Patient Position: Sitting, Cuff Size: Adult Regular)   Ht 1.676 m (5' 6\")   Wt 61.2 kg (135 lb)   BMI 21.79 kg/m    BMI= Body mass index is 21.79 kg/m .    General appearance: Patient is alert and fully cooperative with history & exam.  No sign of distress is noted during the visit.     Psychiatric: Affect is pleasant & appropriate.  Patient appears motivated to improve health.     Respiratory: Breathing is regular & unlabored while sitting.     HEENT: Hearing is intact to spoken word.  Speech is clear.  No gross evidence of visual impairment that would impact ambulation.     Vascular: DP & PT pulses are intact & regular bilaterally.  No significant edema or varicosities noted.  CFT and skin temperature is normal to both lower extremities.     Neurologic: Lower extremity " sensation is intact to light touch.  No evidence of weakness or contracture in the lower extremities.  No evidence of neuropathy.    Dermatologic: Skin is intact to both lower extremities with adequate texture, turgor and tone about the integument.  No paronychia or evidence of soft tissue infection is noted.     Musculoskeletal: Patient is ambulatory with fracture boot about the right lower extremity.  Considerable soft edema is noted surrounding the right ankle with crepitus laterally with any movement of the ankle.      Radiographs 3 views right ankle 3/30/2023 demonstrate displaced fracture starting at the joint line moving proximal and a spiral oblique fashion with posterior and lateral displacement.  There is shortening of the fibula.  Also medial malleolus fracture noted.    DEXA scan 11/21 demonstrates   right hip -2.1   left hip -1.4   lumbar spine -1.6    ASSESSMENT:       ICD-10-CM    1. Closed fracture of right tibia and fibula, initial encounter  S82.201A Physical Therapy Referral    S82.401A            PLAN:  Reviewed patient's chart in [facility].      4/10/2023   Interpreted radiographs demonstrating unstable ankle fracture with posterior malleolus and lateral malleolus components.  There is shortening of the fibula fracture and a spiral oblique fashion.  Recommend open reduction internal fixation to bring the fibula back out to length and reduced the ankle mortise.  Also to provide stability of the tib-fib syndesmosis and ankle joint ankle mortise.    Recommend nonweightbearing at this time to help reduce discomfort.  Refilled Percocet for her.  She may also take her hydroxyzine in addition to the Percocet but most of her pain should be managed by nonweightbearing.  Recommend compression during the day and keep the ankle fracture boot in place day and night.  Palpable crepitus is noted about the fracture.    4/26/2023  All sutures were removed today.  Compression dressing applied  Placed back in a  fracture boot  Can begin short periods of weightbearing with a fracture boot 15 minutes per 60 minutes.  Remain in the fracture boot even during rest and sleep and follow-up in 2 weeks.  Can begin bathing but no soaking or submersion.  She has discontinued pain medication    5/10/2023  Order to begin physical therapy she would like to do this in [place]  She may come out of the fracture boot for rest and sleep and for very short periods of activity in a sturdy shoe as tolerated but stay in the fracture boot for any significant long periods of standing or walking  Encourage compression sock as tolerated  Follow-up again at 6 weeks postop and repeat imaging at that time likely to progress out of the fracture boot completely.      Bruce Galicia DPM

## 2023-06-01 ENCOUNTER — ANCILLARY PROCEDURE (OUTPATIENT)
Dept: GENERAL RADIOLOGY | Facility: CLINIC | Age: 58
End: 2023-06-01
Attending: PODIATRIST
Payer: COMMERCIAL

## 2023-06-01 ENCOUNTER — HOSPITAL ENCOUNTER (OUTPATIENT)
Dept: MAMMOGRAPHY | Facility: CLINIC | Age: 58
Discharge: HOME | End: 2023-06-01
Attending: FAMILY MEDICINE | Admitting: FAMILY MEDICINE
Payer: COMMERCIAL

## 2023-06-01 ENCOUNTER — OFFICE VISIT (OUTPATIENT)
Dept: PODIATRY | Facility: CLINIC | Age: 58
End: 2023-06-01
Payer: COMMERCIAL

## 2023-06-01 VITALS
SYSTOLIC BLOOD PRESSURE: 118 MMHG | WEIGHT: 135 LBS | HEIGHT: 66 IN | BODY MASS INDEX: 21.69 KG/M2 | DIASTOLIC BLOOD PRESSURE: 80 MMHG

## 2023-06-01 DIAGNOSIS — S82.401A CLOSED FRACTURE OF RIGHT TIBIA AND FIBULA, INITIAL ENCOUNTER: ICD-10-CM

## 2023-06-01 DIAGNOSIS — S82.201A CLOSED FRACTURE OF RIGHT TIBIA AND FIBULA, INITIAL ENCOUNTER: ICD-10-CM

## 2023-06-01 DIAGNOSIS — S82.201A CLOSED FRACTURE OF RIGHT TIBIA AND FIBULA, INITIAL ENCOUNTER: Primary | ICD-10-CM

## 2023-06-01 DIAGNOSIS — S82.401A CLOSED FRACTURE OF RIGHT TIBIA AND FIBULA, INITIAL ENCOUNTER: Primary | ICD-10-CM

## 2023-06-01 DIAGNOSIS — Z12.31 VISIT FOR SCREENING MAMMOGRAM: ICD-10-CM

## 2023-06-01 PROCEDURE — 99024 POSTOP FOLLOW-UP VISIT: CPT | Performed by: PODIATRIST

## 2023-06-01 PROCEDURE — 73610 X-RAY EXAM OF ANKLE: CPT | Mod: TC | Performed by: RADIOLOGY

## 2023-06-01 PROCEDURE — 77067 SCR MAMMO BI INCL CAD: CPT

## 2023-06-01 ASSESSMENT — PAIN SCALES - GENERAL: PAINLEVEL: NO PAIN (0)

## 2023-06-01 NOTE — LETTER
"    6/1/2023         RE: Lorna Gorman  [address] [address] [facility] [address] [facility] [address]        Dear Colleague,    Thank you for referring your patient, Lorna Gorman, to the [facility]. Please see a copy of my visit note below.    Chief Complaint   Patient presents with     Surgical Followup     (7w1d) PT evaluation, WB w/tall gray fx boot; DOS: 4/12/2023~Open reduction and internal fixation of right ankle with syndesmosis repair; XR R ankle 6/1/2023; LOV 5/10/2023       HPI:  Lorna Gorman is a 57 year old female who is seen in consultation at the request of Abelino Franks PA-C    Pt presents for eval of:   (Onset, Location, L/R, Character, Treatments, Injury if yes)    XR Right ankle 3/30/2023     Onset 3/29/2023, while walking, \"felt and heard ankle break\". Presents with lateral Right ankle and anterior Right lower leg pain, WB w/tall gray fx boot and karly friend, Juwan.  Constant, swelling, bruising, dull ache, sharp, stabbing, burning, warm, pain 2-10/10 worse with walking.    Oxycodone, ibuprofen, rest, elevation, wearing fx boot for daily activity only.    Retired .      History of osteopenia and bilateral wrist fractures after a fall on ice.    ORIF 4/12/23     Since last visit she is walking several hours a day without complaints of pain.  She is started physical therapy in [place].    ROS:  10 point ROS neg other than the symptoms noted above in the HPI.    Patient Active Problem List   Diagnosis     Tobacco abuse disorder     CARDIOVASCULAR SCREENING; LDL GOAL LESS THAN 160     TITA (generalized anxiety disorder)     Benign essential hypertension     Hypertension goal BP (blood pressure) < 140/90     Dyshydrosis     ASCUS with positive high risk HPV cervical     Family history of colon cancer       PAST MEDICAL HISTORY:   Past Medical History:   Diagnosis Date     Abnormal Pap smear of cervix 01/22/2020    see problem list     Benign essential hypertension 05/18/2017     " GENERALIZED ANXIETY DIS 05/15/2007     Hyperlipidemia with target LDL less than 130 11/06/2013     Hypertension goal BP (blood pressure) < 140/90 05/18/2017     Major depressive disorder, recurrent episode, mild (H) 12/09/2015     Migraine, unspecified, without mention of intractable migraine without mention of status migrainosus      Moderate Depression [296.32] 08/19/2009     Premenstrual tension syndromes     PMDS on Sarafem and doing very well.     Tobacco abuse 11/06/2013        PAST SURGICAL HISTORY:   Past Surgical History:   Procedure Laterality Date     COLONOSCOPY N/A 2/9/2015    Procedure: COLONOSCOPY;  Surgeon: Ritesh Chiu MD;  Location:  GI     [facility] HYSTEROSCOPY, SURGICAL; W/ ENDOMETRIAL ABLATION, ANY METHOD  12/18/09     OPEN REDUCTION INTERNAL FIXATION ANKLE Right 4/12/2023    Procedure: OPEN REDUCTION INTERNAL FIXATION right ankle;  Surgeon: Bruce Galicia DPM;  Location:  OR     [facility] ANESTH,NOSE,SINUS SURGERY  2000     [facility] RESEC HEAD OF PHALANX,TOE  2003        MEDICATIONS:   Current Outpatient Medications:      alendronate (FOSAMAX) 35 MG tablet, Take 1 tablet (35 mg) by mouth every 7 days, Disp: 12 tablet, Rfl: 3     atorvastatin (LIPITOR) 10 MG tablet, TAKE 1 TABLET (10 MG) BY MOUTH DAILY, Disp: 90 tablet, Rfl: 1     losartan (COZAAR) 100 MG tablet, Take 1 tablet (100 mg) by mouth daily, Disp: 90 tablet, Rfl: 3     sertraline (ZOLOFT) 100 MG tablet, Take 1 tablet by mouth daily, Disp: , Rfl:      hydrOXYzine (ATARAX) 10 MG tablet, Take 10 mg up to every 6 hours for any anxiety or panic feelings, may take 20-50 mg at bedtime for insomnia as needed (Patient not taking: Reported on 4/26/2023), Disp: 90 tablet, Rfl: 3     IBUPROFEN PO, , Disp: , Rfl:      ALLERGIES:    Allergies   Allergen Reactions     No Known Allergies      Ragweeds         SOCIAL HISTORY:   Social History     Socioeconomic History     Marital status:      Spouse name: Delbert     Number of children: 3     Years of  education: 15     Highest education level: Not on file   Occupational History     Occupation:      Employer: [employer]   Tobacco Use     Smoking status: Former     Packs/day: 0.00     Years: 7.00     Pack years: 0.00     Types: Cigarettes     Start date: 2010     Quit date: 2021     Years since quittin.7     Smokeless tobacco: Never     Tobacco comments:     I QUIT!!   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 Yes   Social History Narrative     from , live separately for years. Has male friend     Social Determinants of Health     Financial Resource Strain: Low Risk  (2022)    Overall Financial Resource Strain (CARDIA)      Difficulty of Paying Living Expenses: Not hard at all   Food Insecurity: No Food Insecurity (2022)    Hunger Vital Sign      Worried About Running Out of Food in the Last Year: Never true      Ran Out of Food in the Last Year: Never true   Transportation Needs: No Transportation Needs (2022)    PRAPARE - Transportation      Lack of Transportation (Medical): No      Lack of Transportation (Non-Medical): No   Physical Activity: Inactive (2022)    Exercise Vital  "Sign      Days of Exercise per Week: 0 days      Minutes of Exercise per Session: 0 min   Stress: Stress Concern Present (2022)    [employer] [place] [employer] - Occupational Stress Questionnaire      Feeling of Stress : Rather much   Social Connections: Not on file   Intimate Partner Violence: Not on file   Housing Stability: Not on file        FAMILY HISTORY:   Family History   Problem Relation Age of Onset     Myocardial Infarction Father 40         at age 40     Hypertension Father          at 40.  Heart Attack     Lipids Father      C.A.D. Father          at age 40 from an MI     Arthritis Mother      Eye Disorder Mother         glaucoma     Genitourinary Problems Mother         hysterectomy     Hypertension Mother      Colon Cancer Mother 80     Liver Cancer Mother 80     Hyperlipidemia Mother      Cardiovascular Maternal Grandmother      Eye Disorder Maternal Grandmother         cataracts     Heart Disease Maternal Grandmother      Hypertension Maternal Grandmother      Cardiovascular Maternal Grandfather      Hypertension Maternal Grandfather      Cardiovascular Paternal Grandmother      Hypertension Paternal Grandmother      Cardiovascular Paternal Grandfather      Hypertension Paternal Grandfather      Musculoskeletal Disorder Paternal Uncle         MS     Heart Failure Maternal Aunt 85        EXAM:Vitals: /80 (BP Location: Left arm, Patient Position: Sitting, Cuff Size: Adult Regular)   Ht 1.676 m (5' 6\")   Wt 61.2 kg (135 lb)   BMI 21.79 kg/m    BMI= Body mass index is 21.79 kg/m .    General appearance: Patient is alert and fully cooperative with history & exam.  No sign of distress is noted during the visit.     Psychiatric: Affect is pleasant & appropriate.  Patient appears motivated to improve health.     Respiratory: Breathing is regular & unlabored while sitting.     HEENT: Hearing is intact to spoken word.  Speech is clear.  No gross evidence of visual " impairment that would impact ambulation.     Vascular: DP & PT pulses are intact & regular bilaterally.  No significant edema or varicosities noted.  CFT and skin temperature is normal to both lower extremities.     Neurologic: Lower extremity sensation is intact to light touch.  No evidence of weakness or contracture in the lower extremities.  No evidence of neuropathy.    Dermatologic: Skin is intact to both lower extremities with adequate texture, turgor and tone about the integument.  No paronychia or evidence of soft tissue infection is noted.     Musculoskeletal: Patient is ambulatory with fracture boot about the right lower extremity.  Considerable soft edema is noted surrounding the right ankle with crepitus laterally with any movement of the ankle.      Radiographs 3 views right ankle 3/30/2023 demonstrate displaced fracture starting at the joint line moving proximal and a spiral oblique fashion with posterior and lateral displacement.  There is shortening of the fibula.  Also medial malleolus fracture noted.    Radiographs 3 views right ankle 6/1/2023 demonstrate appropriate interval healing without change in alignment or position of the hardware.    DEXA scan 11/21 demonstrates   right hip -2.1   left hip -1.4   lumbar spine -1.6    ASSESSMENT:       ICD-10-CM    1. Closed fracture of right tibia and fibula, initial encounter  S82.201A XR Ankle Right G/E 3 Views    S82.401A            PLAN:  Reviewed patient's chart in [facility].      4/10/2023   Interpreted radiographs demonstrating unstable ankle fracture with posterior malleolus and lateral malleolus components.  There is shortening of the fibula fracture and a spiral oblique fashion.  Recommend open reduction internal fixation to bring the fibula back out to length and reduced the ankle mortise.  Also to provide stability of the tib-fib syndesmosis and ankle joint ankle mortise.    Recommend nonweightbearing at this time to help reduce discomfort.   Refilled Percocet for her.  She may also take her hydroxyzine in addition to the Percocet but most of her pain should be managed by nonweightbearing.  Recommend compression during the day and keep the ankle fracture boot in place day and night.  Palpable crepitus is noted about the fracture.    4/26/2023  All sutures were removed today.  Compression dressing applied  Placed back in a fracture boot  Can begin short periods of weightbearing with a fracture boot 15 minutes per 60 minutes.  Remain in the fracture boot even during rest and sleep and follow-up in 2 weeks.  Can begin bathing but no soaking or submersion.  She has discontinued pain medication    5/10/2023  Order to begin physical therapy she would like to do this in [place]  She may come out of the fracture boot for rest and sleep and for very short periods of activity in a sturdy shoe as tolerated but stay in the fracture boot for any significant long periods of standing or walking  Encourage compression sock as tolerated  Follow-up again at 6 weeks postop and repeat imaging at that time likely to progress out of the fracture boot completely.    6/1/2023  Obtained and interpreted radiographs  Start ankle sports brace and return to all activities   May progress out of the fracture boot as tolerated and stay in sturdy shoes.  Would encourage an ankle sports brace until strength and proprioception is equal bilateral.  Do not stop physical therapy until strength and proprioception is equal bilateral  Follow-up again in 1 month  Continue compression or ankle sports brace during the day  No heavy lifting or aggressive activities or activities on uneven ground until strength and proprioception is equal      Bruce Galicia DPM          Again, thank you for allowing me to participate in the care of your patient.        Sincerely,        Bruce Galicia DPM

## 2023-06-01 NOTE — PATIENT INSTRUCTIONS
Sturdy and reliable ankle braces are most readily available on line, [employer], or [employer] and delivered for around $15-60.  Health insurance often does not pay for this.    Consider:   Ease of application  Volume of the brace and will it fit in your shoes  Does the brace look like it will provide full circumference compression, which is needed if your ankle is swollen and less helpful for chronic deformities.     For recovery of an acute injury, avoid plastic stays on the side of the brace like the Aircast ankle stirrup as they are very bulky and do not fit in most shoes.  Avoid simple neoprene or compression sleeve only braces, as they do not provide much stability or resist re-injury during your recovery.    For acute or recent ankle injuries we often use an ankle brace for compression and stability and mostly to prevent minor re-injuries until the patient is able to regain strength and balance and able to perform one footed toe raise and one footed balance, equal bilateral.  Then discontinue its use as it can encourage the ankle to remain weak over many months.      For long term deformities and chronic instability the more rigid and thus bulkier braces are most often used to provide more help long term or help a ligament that has been elongated after multiple injuries.      Procare double strap ankle support is a good choice for acute ankle sprains or for compression, low volume and moderate stability.    Swede-O ankle brace   Procare lace up ankle brace - are both reasonable choices for long term support.  However they get a bit bulkier.  These are intended for longer term use.    Tri Lock ankle brace or ASO ankle brace are probably the most stable and intended for long term use, chronic ankle instability and most aggressive activity and movements.  They also consume the most volume in your shoes and may be harder to put on for some patients.

## 2023-06-01 NOTE — PROGRESS NOTES
"Chief Complaint   Patient presents with     Surgical Followup     (7w1d) PT evaluation, WB w/tall gray fx boot; DOS: 4/12/2023~Open reduction and internal fixation of right ankle with syndesmosis repair; XR R ankle 6/1/2023; LOV 5/10/2023       HPI:  Lorna Gorman is a 57 year old female who is seen in consultation at the request of Abelino Franks PA-C    Pt presents for eval of:   (Onset, Location, L/R, Character, Treatments, Injury if yes)    XR Right ankle 3/30/2023     Onset 3/29/2023, while walking, \"felt and heard ankle break\". Presents with lateral Right ankle and anterior Right lower leg pain, WB w/tall gray fx boot and karly friend, Juwan.  Constant, swelling, bruising, dull ache, sharp, stabbing, burning, warm, pain 2-10/10 worse with walking.    Oxycodone, ibuprofen, rest, elevation, wearing fx boot for daily activity only.    Retired .      History of osteopenia and bilateral wrist fractures after a fall on ice.    ORIF 4/12/23     Since last visit she is walking several hours a day without complaints of pain.  She is started physical therapy in [place].    ROS:  10 point ROS neg other than the symptoms noted above in the HPI.    Patient Active Problem List   Diagnosis     Tobacco abuse disorder     CARDIOVASCULAR SCREENING; LDL GOAL LESS THAN 160     TITA (generalized anxiety disorder)     Benign essential hypertension     Hypertension goal BP (blood pressure) < 140/90     Dyshydrosis     ASCUS with positive high risk HPV cervical     Family history of colon cancer       PAST MEDICAL HISTORY:   Past Medical History:   Diagnosis Date     Abnormal Pap smear of cervix 01/22/2020    see problem list     Benign essential hypertension 05/18/2017     GENERALIZED ANXIETY DIS 05/15/2007     Hyperlipidemia with target LDL less than 130 11/06/2013     Hypertension goal BP (blood pressure) < 140/90 05/18/2017     Major depressive disorder, recurrent episode, mild (H) 12/09/2015     Migraine, " unspecified, without mention of intractable migraine without mention of status migrainosus      Moderate Depression [296.32] 08/19/2009     Premenstrual tension syndromes     PMDS on [place] and doing very well.     Tobacco abuse 11/06/2013        PAST SURGICAL HISTORY:   Past Surgical History:   Procedure Laterality Date     COLONOSCOPY N/A 2/9/2015    Procedure: COLONOSCOPY;  Surgeon: Ritesh Chiu MD;  Location:  GI      HYSTEROSCOPY, SURGICAL; W/ ENDOMETRIAL ABLATION, ANY METHOD  12/18/09     OPEN REDUCTION INTERNAL FIXATION ANKLE Right 4/12/2023    Procedure: OPEN REDUCTION INTERNAL FIXATION right ankle;  Surgeon: Bruce Galicia DPM;  Location:  OR     [facility] ANESTH,NOSE,SINUS SURGERY  2000     [facility] RESEC HEAD OF PHALANX,TOE  2003        MEDICATIONS:   Current Outpatient Medications:      alendronate (FOSAMAX) 35 MG tablet, Take 1 tablet (35 mg) by mouth every 7 days, Disp: 12 tablet, Rfl: 3     atorvastatin (LIPITOR) 10 MG tablet, TAKE 1 TABLET (10 MG) BY MOUTH DAILY, Disp: 90 tablet, Rfl: 1     losartan (COZAAR) 100 MG tablet, Take 1 tablet (100 mg) by mouth daily, Disp: 90 tablet, Rfl: 3     sertraline (ZOLOFT) 100 MG tablet, Take 1 tablet by mouth daily, Disp: , Rfl:      hydrOXYzine (ATARAX) 10 MG tablet, Take 10 mg up to every 6 hours for any anxiety or panic feelings, may take 20-50 mg at bedtime for insomnia as needed (Patient not taking: Reported on 4/26/2023), Disp: 90 tablet, Rfl: 3     IBUPROFEN PO, , Disp: , Rfl:      ALLERGIES:    Allergies   Allergen Reactions     No Known Allergies      Ragweeds         SOCIAL HISTORY:   Social History     Socioeconomic History     Marital status:      Spouse name: Delbert     Number of children: 3     Years of education: 15     Highest education level: Not on file   Occupational History     Occupation:      Employer: [employer]   Tobacco Use     Smoking status: Former     Packs/day: 0.00     Years: 7.00     Pack years: 0.00      Types: Cigarettes     Start date: 2010     Quit date: 2021     Years since quittin.7     Smokeless tobacco: Never     Tobacco comments:     I QUIT!!   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 Yes   Social History Narrative     from , live separately for years. Has male friend     Social Determinants of Health     Financial Resource Strain: Low Risk  (2022)    Overall Financial Resource Strain (CARDIA)      Difficulty of Paying Living Expenses: Not hard at all   Food Insecurity: No Food Insecurity (2022)    Hunger Vital Sign      Worried About Running Out of Food in the Last Year: Never true      Ran Out of Food in the Last Year: Never true   Transportation Needs: No Transportation Needs (2022)    PRAPARE - Transportation      Lack of Transportation (Medical): No      Lack of Transportation (Non-Medical): No   Physical Activity: Inactive (2022)    Exercise Vital Sign      Days of Exercise per Week: 0 days      Minutes of Exercise per Session: 0 min   Stress: Stress Concern Present (2022)    [place] [facility] [employer] [facility] - Occupational Stress Questionnaire      Feeling of Stress :  "Rather much   Social Connections: Not on file   Intimate Partner Violence: Not on file   Housing Stability: Not on file        FAMILY HISTORY:   Family History   Problem Relation Age of Onset     Myocardial Infarction Father 40         at age 40     Hypertension Father          at 40.  Heart Attack     Lipids Father      C.A.D. Father          at age 40 from an MI     Arthritis Mother      Eye Disorder Mother         glaucoma     Genitourinary Problems Mother         hysterectomy     Hypertension Mother      Colon Cancer Mother 80     Liver Cancer Mother 80     Hyperlipidemia Mother      Cardiovascular Maternal Grandmother      Eye Disorder Maternal Grandmother         cataracts     Heart Disease Maternal Grandmother      Hypertension Maternal Grandmother      Cardiovascular Maternal Grandfather      Hypertension Maternal Grandfather      Cardiovascular Paternal Grandmother      Hypertension Paternal Grandmother      Cardiovascular Paternal Grandfather      Hypertension Paternal Grandfather      Musculoskeletal Disorder Paternal Uncle         MS     Heart Failure Maternal Aunt 85        EXAM:Vitals: /80 (BP Location: Left arm, Patient Position: Sitting, Cuff Size: Adult Regular)   Ht 1.676 m (5' 6\")   Wt 61.2 kg (135 lb)   BMI 21.79 kg/m    BMI= Body mass index is 21.79 kg/m .    General appearance: Patient is alert and fully cooperative with history & exam.  No sign of distress is noted during the visit.     Psychiatric: Affect is pleasant & appropriate.  Patient appears motivated to improve health.     Respiratory: Breathing is regular & unlabored while sitting.     HEENT: Hearing is intact to spoken word.  Speech is clear.  No gross evidence of visual impairment that would impact ambulation.     Vascular: DP & PT pulses are intact & regular bilaterally.  No significant edema or varicosities noted.  CFT and skin temperature is normal to both lower extremities.     Neurologic: Lower extremity " sensation is intact to light touch.  No evidence of weakness or contracture in the lower extremities.  No evidence of neuropathy.    Dermatologic: Skin is intact to both lower extremities with adequate texture, turgor and tone about the integument.  No paronychia or evidence of soft tissue infection is noted.     Musculoskeletal: Patient is ambulatory with fracture boot about the right lower extremity.  Considerable soft edema is noted surrounding the right ankle with crepitus laterally with any movement of the ankle.      Radiographs 3 views right ankle 3/30/2023 demonstrate displaced fracture starting at the joint line moving proximal and a spiral oblique fashion with posterior and lateral displacement.  There is shortening of the fibula.  Also medial malleolus fracture noted.    Radiographs 3 views right ankle 6/1/2023 demonstrate appropriate interval healing without change in alignment or position of the hardware.    DEXA scan 11/21 demonstrates   right hip -2.1   left hip -1.4   lumbar spine -1.6    ASSESSMENT:       ICD-10-CM    1. Closed fracture of right tibia and fibula, initial encounter  S82.201A XR Ankle Right G/E 3 Views    S82.401A            PLAN:  Reviewed patient's chart in [facility].      4/10/2023   Interpreted radiographs demonstrating unstable ankle fracture with posterior malleolus and lateral malleolus components.  There is shortening of the fibula fracture and a spiral oblique fashion.  Recommend open reduction internal fixation to bring the fibula back out to length and reduced the ankle mortise.  Also to provide stability of the tib-fib syndesmosis and ankle joint ankle mortise.    Recommend nonweightbearing at this time to help reduce discomfort.  Refilled Percocet for her.  She may also take her hydroxyzine in addition to the Percocet but most of her pain should be managed by nonweightbearing.  Recommend compression during the day and keep the ankle fracture boot in place day and night.   Palpable crepitus is noted about the fracture.    4/26/2023  All sutures were removed today.  Compression dressing applied  Placed back in a fracture boot  Can begin short periods of weightbearing with a fracture boot 15 minutes per 60 minutes.  Remain in the fracture boot even during rest and sleep and follow-up in 2 weeks.  Can begin bathing but no soaking or submersion.  She has discontinued pain medication    5/10/2023  Order to begin physical therapy she would like to do this in [place]  She may come out of the fracture boot for rest and sleep and for very short periods of activity in a sturdy shoe as tolerated but stay in the fracture boot for any significant long periods of standing or walking  Encourage compression sock as tolerated  Follow-up again at 6 weeks postop and repeat imaging at that time likely to progress out of the fracture boot completely.    6/1/2023  Obtained and interpreted radiographs  Start ankle sports brace and return to all activities   May progress out of the fracture boot as tolerated and stay in sturdy shoes.  Would encourage an ankle sports brace until strength and proprioception is equal bilateral.  Do not stop physical therapy until strength and proprioception is equal bilateral  Follow-up again in 1 month  Continue compression or ankle sports brace during the day  No heavy lifting or aggressive activities or activities on uneven ground until strength and proprioception is equal      Bruce Galicia DPM

## 2023-08-19 ENCOUNTER — HEALTH MAINTENANCE LETTER (OUTPATIENT)
Age: 58
End: 2023-08-19

## 2023-08-31 DIAGNOSIS — I10 BENIGN ESSENTIAL HYPERTENSION: ICD-10-CM

## 2023-08-31 DIAGNOSIS — F41.1 GAD (GENERALIZED ANXIETY DISORDER): Primary | ICD-10-CM

## 2023-09-01 RX ORDER — LOSARTAN POTASSIUM 100 MG/1
100 TABLET ORAL DAILY
Qty: 90 TABLET | Refills: 3 | Status: SHIPPED | OUTPATIENT
Start: 2023-09-01 | End: 2023-09-07

## 2023-09-05 ASSESSMENT — ENCOUNTER SYMPTOMS
SHORTNESS OF BREATH: 0
FREQUENCY: 0
DIARRHEA: 0
BREAST MASS: 0
FEVER: 0
SORE THROAT: 0
DIZZINESS: 0
EYE PAIN: 0
NERVOUS/ANXIOUS: 0
HEMATOCHEZIA: 0
MYALGIAS: 0
WEAKNESS: 0
HEMATURIA: 0
ABDOMINAL PAIN: 0
NAUSEA: 0
HEARTBURN: 0
CONSTIPATION: 0
CHILLS: 0
PALPITATIONS: 0
HEADACHES: 0
ARTHRALGIAS: 0
COUGH: 0
JOINT SWELLING: 0
PARESTHESIAS: 0
DYSURIA: 0

## 2023-09-07 ENCOUNTER — OFFICE VISIT (OUTPATIENT)
Dept: FAMILY MEDICINE | Facility: CLINIC | Age: 58
End: 2023-09-07
Payer: COMMERCIAL

## 2023-09-07 VITALS
DIASTOLIC BLOOD PRESSURE: 86 MMHG | SYSTOLIC BLOOD PRESSURE: 120 MMHG | WEIGHT: 162.4 LBS | TEMPERATURE: 98.1 F | HEIGHT: 67 IN | HEART RATE: 83 BPM | RESPIRATION RATE: 14 BRPM | BODY MASS INDEX: 25.49 KG/M2

## 2023-09-07 DIAGNOSIS — I10 BENIGN ESSENTIAL HYPERTENSION: ICD-10-CM

## 2023-09-07 DIAGNOSIS — F41.1 GAD (GENERALIZED ANXIETY DISORDER): ICD-10-CM

## 2023-09-07 DIAGNOSIS — M85.851 OSTEOPENIA OF BOTH HIPS: ICD-10-CM

## 2023-09-07 DIAGNOSIS — R63.5 WEIGHT GAIN: ICD-10-CM

## 2023-09-07 DIAGNOSIS — Z00.01 ENCOUNTER FOR ROUTINE ADULT MEDICAL EXAM WITH ABNORMAL FINDINGS: Primary | ICD-10-CM

## 2023-09-07 DIAGNOSIS — M85.852 OSTEOPENIA OF BOTH HIPS: ICD-10-CM

## 2023-09-07 DIAGNOSIS — E78.2 MIXED HYPERLIPIDEMIA: ICD-10-CM

## 2023-09-07 LAB
ALBUMIN SERPL BCG-MCNC: 4.3 G/DL (ref 3.5–5.2)
ALP SERPL-CCNC: 123 U/L (ref 35–104)
ALT SERPL W P-5'-P-CCNC: 35 U/L (ref 0–50)
ANION GAP SERPL CALCULATED.3IONS-SCNC: 10 MMOL/L (ref 7–15)
AST SERPL W P-5'-P-CCNC: 33 U/L (ref 0–45)
BILIRUB SERPL-MCNC: 0.3 MG/DL
BUN SERPL-MCNC: 16 MG/DL (ref 8–23)
CALCIUM SERPL-MCNC: 9.5 MG/DL (ref 8.6–10)
CHLORIDE SERPL-SCNC: 103 MMOL/L (ref 98–107)
CHOLEST SERPL-MCNC: 203 MG/DL
CREAT SERPL-MCNC: 0.69 MG/DL (ref 0.51–0.95)
CREAT UR-MCNC: 168.8 MG/DL
DEPRECATED HCO3 PLAS-SCNC: 26 MMOL/L (ref 22–29)
EGFRCR SERPLBLD CKD-EPI 2021: >90 ML/MIN/1.73M2
GLUCOSE SERPL-MCNC: 109 MG/DL (ref 70–99)
HDLC SERPL-MCNC: 86 MG/DL
LDLC SERPL CALC-MCNC: 100 MG/DL
MICROALBUMIN UR-MCNC: <12 MG/L
MICROALBUMIN/CREAT UR: NORMAL MG/G{CREAT}
NONHDLC SERPL-MCNC: 117 MG/DL
POTASSIUM SERPL-SCNC: 4.1 MMOL/L (ref 3.4–5.3)
PROT SERPL-MCNC: 7.1 G/DL (ref 6.4–8.3)
SODIUM SERPL-SCNC: 139 MMOL/L (ref 136–145)
TRIGL SERPL-MCNC: 83 MG/DL
TSH SERPL DL<=0.005 MIU/L-ACNC: 1.38 UIU/ML (ref 0.3–4.2)

## 2023-09-07 PROCEDURE — 99396 PREV VISIT EST AGE 40-64: CPT | Performed by: FAMILY MEDICINE

## 2023-09-07 PROCEDURE — 84443 ASSAY THYROID STIM HORMONE: CPT | Performed by: FAMILY MEDICINE

## 2023-09-07 PROCEDURE — 82043 UR ALBUMIN QUANTITATIVE: CPT | Performed by: FAMILY MEDICINE

## 2023-09-07 PROCEDURE — 80061 LIPID PANEL: CPT | Performed by: FAMILY MEDICINE

## 2023-09-07 PROCEDURE — 80053 COMPREHEN METABOLIC PANEL: CPT | Performed by: FAMILY MEDICINE

## 2023-09-07 PROCEDURE — 36415 COLL VENOUS BLD VENIPUNCTURE: CPT | Performed by: FAMILY MEDICINE

## 2023-09-07 PROCEDURE — 82570 ASSAY OF URINE CREATININE: CPT | Performed by: FAMILY MEDICINE

## 2023-09-07 RX ORDER — ATORVASTATIN CALCIUM 10 MG/1
10 TABLET, FILM COATED ORAL DAILY
Qty: 90 TABLET | Refills: 3 | Status: SHIPPED | OUTPATIENT
Start: 2023-09-07

## 2023-09-07 RX ORDER — LOSARTAN POTASSIUM 100 MG/1
100 TABLET ORAL DAILY
Qty: 90 TABLET | Refills: 3 | Status: SHIPPED | OUTPATIENT
Start: 2023-09-07

## 2023-09-07 RX ORDER — SERTRALINE HYDROCHLORIDE 100 MG/1
100 TABLET, FILM COATED ORAL DAILY
Qty: 45 TABLET | Refills: 3 | Status: SHIPPED | OUTPATIENT
Start: 2023-09-07

## 2023-09-07 RX ORDER — ALENDRONATE SODIUM 35 MG/1
35 TABLET ORAL
Qty: 12 TABLET | Refills: 3 | Status: SHIPPED | OUTPATIENT
Start: 2023-09-07

## 2023-09-07 ASSESSMENT — ENCOUNTER SYMPTOMS
FEVER: 0
PARESTHESIAS: 0
DIZZINESS: 0
SORE THROAT: 0
BREAST MASS: 0
HEMATURIA: 0
DYSURIA: 0
COUGH: 0
PALPITATIONS: 0
SHORTNESS OF BREATH: 0
HEARTBURN: 0
HEMATOCHEZIA: 0
CONSTIPATION: 0
MYALGIAS: 0
NAUSEA: 0
JOINT SWELLING: 0
FREQUENCY: 0
DIARRHEA: 0
ARTHRALGIAS: 0
CHILLS: 0
WEAKNESS: 0
ABDOMINAL PAIN: 0
HEADACHES: 0
EYE PAIN: 0
NERVOUS/ANXIOUS: 0

## 2023-09-07 ASSESSMENT — PAIN SCALES - GENERAL: PAINLEVEL: NO PAIN (0)

## 2023-09-07 NOTE — PROGRESS NOTES
`   SUBJECTIVE:   CC: Lorna is an 58 year old who presents for preventive health visit.       2023     8:30 AM   Additional Questions   Roomed by Navid Grayson CMA       Healthy Habits:     Getting at least 3 servings of Calcium per day:  Yes    Bi-annual eye exam:  Yes    Dental care twice a year:  Yes    Sleep apnea or symptoms of sleep apnea:  None    Diet:  Regular (no restrictions)    Frequency of exercise:  1 day/week    Duration of exercise:  15-30 minutes    Taking medications regularly:  Yes    Medication side effects:  Not applicable    Additional concerns today:  No          PROBLEMS TO ADD ON...  Weight gain over the past 4 months.  She had a ankle fracture that she had an ORIF. She is finally walking better and had been slow going up until now.  She has pretty much stopped drinking now and is limiting her food intake.        Social History     Tobacco Use    Smoking status: Former     Packs/day: 0.00     Years: 7.00     Pack years: 0.00     Types: Cigarettes     Start date: 2010     Quit date: 2021     Years since quittin.0    Smokeless tobacco: Never    Tobacco comments:     I QUIT!!   Substance Use Topics    Alcohol use: Yes     Alcohol/week: 7.0 standard drinks of alcohol     Types: 7 Glasses of wine per week     Comment: daily 1-2 wine             2023     2:34 PM   Alcohol Use   Prescreen: >3 drinks/day or >7 drinks/week? No     Reviewed orders with patient.  Reviewed health maintenance and updated orders accordingly - Yes  Lab work is in process  Labs reviewed in EPIC  BP Readings from Last 3 Encounters:   23 120/86   23 118/80   05/10/23 118/72    Wt Readings from Last 3 Encounters:   23 73.7 kg (162 lb 6.4 oz)   23 61.2 kg (135 lb)   05/10/23 61.2 kg (135 lb)                  Patient Active Problem List   Diagnosis    Tobacco abuse disorder    CARDIOVASCULAR SCREENING; LDL GOAL LESS THAN 160    TIAT (generalized anxiety disorder)    Benign essential  hypertension    Hypertension goal BP (blood pressure) < 140/90    Dyshydrosis    ASCUS with positive high risk HPV cervical    Family history of colon cancer     Past Surgical History:   Procedure Laterality Date    COLONOSCOPY N/A 2015    Procedure: COLONOSCOPY;  Surgeon: Ritesh Chiu MD;  Location:  GI     HYSTEROSCOPY, SURGICAL; W/ ENDOMETRIAL ABLATION, ANY METHOD  09    OPEN REDUCTION INTERNAL FIXATION ANKLE Right 2023    Procedure: OPEN REDUCTION INTERNAL FIXATION right ankle;  Surgeon: Bruce Galicia DPM;  Location:  OR    [facility] ANESTH,NOSE,SINUS SURGERY      [facility] RESEC HEAD OF PHALANX,TOE         Social History     Tobacco Use    Smoking status: Former     Packs/day: 0.00     Years: 7.00     Pack years: 0.00     Types: Cigarettes     Start date: 2010     Quit date: 2021     Years since quittin.0    Smokeless tobacco: Never    Tobacco comments:     I QUIT!!   Substance Use Topics    Alcohol use: Yes     Alcohol/week: 7.0 standard drinks of alcohol     Types: 7 Glasses of wine per week     Comment: daily 1-2 wine     Family History   Problem Relation Age of Onset    Myocardial Infarction Father 40         at age 40    Hypertension Father          at 40.  Heart Attack    Lipids Father     C.A.D. Father          at age 40 from an MI    Arthritis Mother     Eye Disorder Mother         glaucoma    Genitourinary Problems Mother         hysterectomy    Hypertension Mother     Colon Cancer Mother 80    Liver Cancer Mother 80    Hyperlipidemia Mother     Cardiovascular Maternal Grandmother     Eye Disorder Maternal Grandmother         cataracts    Heart Disease Maternal Grandmother     Hypertension Maternal Grandmother     Cardiovascular Maternal Grandfather     Hypertension Maternal Grandfather     Cardiovascular Paternal Grandmother     Hypertension Paternal Grandmother     Cardiovascular Paternal Grandfather     Hypertension Paternal Grandfather      Musculoskeletal Disorder Paternal Uncle         MS    Heart Failure Maternal Aunt 85         Current Outpatient Medications   Medication Sig Dispense Refill    alendronate (FOSAMAX) 35 MG tablet Take 1 tablet (35 mg) by mouth every 7 days 12 tablet 3    atorvastatin (LIPITOR) 10 MG tablet Take 1 tablet (10 mg) by mouth daily 90 tablet 3    losartan (COZAAR) 100 MG tablet Take 1 tablet (100 mg) by mouth daily 90 tablet 3    sertraline (ZOLOFT) 100 MG tablet Take 1 tablet (100 mg) by mouth daily 45 tablet 3    sertraline (ZOLOFT) 50 MG tablet Take 1/2 tablet (25 mg) by mouth nightly for 2-3 weeks, then increase to 1 tablet (50 mg) nightly if necessary. 90 tablet 3    hydrOXYzine (ATARAX) 10 MG tablet Take 10 mg up to every 6 hours for any anxiety or panic feelings, may take 20-50 mg at bedtime for insomnia as needed (Patient not taking: Reported on 4/26/2023) 90 tablet 3     Allergies   Allergen Reactions    No Known Allergies     Ragweeds      Recent Labs   Lab Test 09/07/23  0901 06/30/22  0926 02/05/21  0842 02/13/20  1534 01/22/20  1421    127* 74   < > 42   HDL 86 149 152   < > 209   TRIG 83 63 129   < > 53   ALT 35 278* 82*   < > 71*   CR 0.69 0.77 0.66  --  0.71   GFRESTIMATED >90 89 >90  --  >90   GFRESTBLACK  --   --  >90  --  >90   POTASSIUM 4.1 4.2 4.1  --  3.9   TSH 1.38  --   --   --   --     < > = values in this interval not displayed.        Breast Cancer Screening:    FHS-7:       2/25/2021     4:25 PM 12/15/2021     1:08 PM 6/27/2022     8:19 AM 6/1/2023    11:36 AM 9/5/2023     2:35 PM   Breast CA Risk Assessment (FHS-7)   Did any of your first-degree relatives have breast or ovarian cancer? No No No No No   Did any of your relatives have bilateral breast cancer? No No No No No   Did any man in your family have breast cancer? No No No No No   Did any woman in your family have breast and ovarian cancer? No No No No No   Did any woman in your family have breast cancer before age 50 y? No No No  No No   Do you have 2 or more relatives with breast and/or ovarian cancer? No No No No No   Do you have 2 or more relatives with breast and/or bowel cancer? No No No No No     click delete button to remove this line now  Mammogram Screening: Recommended mammography every 1-2 years with patient discussion and risk factor consideration  Pertinent mammograms are reviewed under the imaging tab.    History of abnormal Pap smear: had a HR other HPV in 2021.  She declined the pap smear today but will agree to do it next year.        Latest Ref Rng & Units 2/25/2021    11:00 AM 2/25/2021    10:58 AM 1/22/2020     2:15 PM   PAP / HPV   PAP (Historical)   NIL     HPV 16 DNA NEG^Negative Negative   Negative    HPV 18 DNA NEG^Negative Negative   Negative    Other HR HPV NEG^Negative Negative   Positive      Reviewed and updated as needed this visit by clinical staff   Tobacco  Allergies  Meds              Reviewed and updated as needed this visit by Provider                 Past Medical History:   Diagnosis Date    Abnormal Pap smear of cervix 01/22/2020    see problem list    Benign essential hypertension 05/18/2017    GENERALIZED ANXIETY DIS 05/15/2007    Hyperlipidemia with target LDL less than 130 11/06/2013    Hypertension goal BP (blood pressure) < 140/90 05/18/2017    Major depressive disorder, recurrent episode, mild (H) 12/09/2015    Migraine, unspecified, without mention of intractable migraine without mention of status migrainosus     Moderate Depression [296.32] 08/19/2009    Premenstrual tension syndromes     PMDS on Sarafem and doing very well.    Tobacco abuse 11/06/2013      Past Surgical History:   Procedure Laterality Date    COLONOSCOPY N/A 2/9/2015    Procedure: COLONOSCOPY;  Surgeon: Ritesh Chiu MD;  Location:  GI    [facility] HYSTEROSCOPY, SURGICAL; W/ ENDOMETRIAL ABLATION, ANY METHOD  12/18/09    OPEN REDUCTION INTERNAL FIXATION ANKLE Right 4/12/2023    Procedure: OPEN REDUCTION INTERNAL FIXATION right  "ankle;  Surgeon: Bruce Galicia DPM;  Location:  OR    [facility] ANESTH,NOSE,SINUS SURGERY      [facility] RESEC HEAD OF PHALANX,TOE       OB History    Para Term  AB Living   3 0 0 0 0 3   SAB IAB Ectopic Multiple Live Births   0 0 0 0 3      # Outcome Date GA Lbr Arcadio/2nd Weight Sex Delivery Anes PTL Lv   3  92 40w0d  2.863 kg (6 lb 5 oz) F    OLE      Name: Suma   2  88 40w0d 10:00 3.317 kg (7 lb 5 oz) F    OLE      Name: Vanessa   1  03/15/86 40w0d 04:00 3.6 kg (7 lb 15 oz) M    OLE      Name: Brennen       Review of Systems   Constitutional:  Negative for chills and fever.   HENT:  Negative for congestion, ear pain, hearing loss and sore throat.    Eyes:  Negative for pain and visual disturbance.   Respiratory:  Negative for cough and shortness of breath.    Cardiovascular:  Negative for chest pain, palpitations and peripheral edema.   Gastrointestinal:  Negative for abdominal pain, constipation, diarrhea, heartburn, hematochezia and nausea.   Breasts:  Negative for tenderness, breast mass and discharge.   Genitourinary:  Negative for dysuria, frequency, genital sores, hematuria, pelvic pain, urgency, vaginal bleeding and vaginal discharge.   Musculoskeletal:  Negative for arthralgias, joint swelling and myalgias.   Skin:  Negative for rash.   Neurological:  Negative for dizziness, weakness, headaches and paresthesias.   Psychiatric/Behavioral:  Negative for mood changes. The patient is not nervous/anxious.            OBJECTIVE:   BP (!) 120/90 (BP Location: Right arm, Patient Position: Left side, Cuff Size: Adult Large)   Pulse 83   Temp 98.1  F (36.7  C) (Temporal)   Resp 14   Ht 1.689 m (5' 6.5\")   Wt 73.7 kg (162 lb 6.4 oz)   BMI 25.82 kg/m    Physical Exam  GENERAL APPEARANCE: healthy, alert and no distress  EYES: Eyes grossly normal to inspection, PERRL and conjunctivae and sclerae normal  HENT: ear canals and TM's normal, nose and mouth " without ulcers or lesions, oropharynx clear and oral mucous membranes moist  NECK: no adenopathy, no asymmetry, masses, or scars and thyroid normal to palpation  RESP: lungs clear to auscultation - no rales, rhonchi or wheezes  BREAST: No breast exam done, we discussed self breast awareness and what to be concerned about, ie; retraction of a nipple, dimpling of the skin or any nipple discharge or aerolar rash that does not clear.   CV: regular rate and rhythm, normal S1 S2, no S3 or S4, no murmur, click or rub, no peripheral edema and peripheral pulses strong  ABDOMEN: soft, nontender, no hepatosplenomegaly, no masses and bowel sounds normal   (female): Exam deferred, patient not due for a pap smear and she is without complaints or concerns today.   MS: no musculoskeletal defects are noted and gait is age appropriate without ataxia.  Her wound from her ORIF is well healed.    SKIN: no suspicious lesions or rashes  NEURO: Normal strength and tone, sensory exam grossly normal, mentation intact and speech normal  PSYCH: mentation appears normal and affect normal/bright    Diagnostic Test Results:  Labs reviewed in Epic  Results for orders placed or performed in visit on 09/07/23 (from the past 24 hour(s))   Comprehensive metabolic panel (BMP + Alb, Alk Phos, ALT, AST, Total. Bili, TP)   Result Value Ref Range    Sodium 139 136 - 145 mmol/L    Potassium 4.1 3.4 - 5.3 mmol/L    Chloride 103 98 - 107 mmol/L    Carbon Dioxide (CO2) 26 22 - 29 mmol/L    Anion Gap 10 7 - 15 mmol/L    Urea Nitrogen 16.0 8.0 - 23.0 mg/dL    Creatinine 0.69 0.51 - 0.95 mg/dL    Calcium 9.5 8.6 - 10.0 mg/dL    Glucose 109 (H) 70 - 99 mg/dL    Alkaline Phosphatase 123 (H) 35 - 104 U/L    AST 33 0 - 45 U/L    ALT 35 0 - 50 U/L    Protein Total 7.1 6.4 - 8.3 g/dL    Albumin 4.3 3.5 - 5.2 g/dL    Bilirubin Total 0.3 <=1.2 mg/dL    GFR Estimate >90 >60 mL/min/1.73m2   Lipid panel reflex to direct LDL Fasting   Result Value Ref Range    Cholesterol  203 (H) <200 mg/dL    Triglycerides 83 <150 mg/dL    Direct Measure HDL 86 >=50 mg/dL    LDL Cholesterol Calculated 100 <=100 mg/dL    Non HDL Cholesterol 117 <130 mg/dL    Narrative    Cholesterol  Desirable:  <200 mg/dL    Triglycerides  Normal:  Less than 150 mg/dL  Borderline High:  150-199 mg/dL  High:  200-499 mg/dL  Very High:  Greater than or equal to 500 mg/dL    Direct Measure HDL  Female:  Greater than or equal to 50 mg/dL   Male:  Greater than or equal to 40 mg/dL    LDL Cholesterol  Desirable:  <100mg/dL  Above Desirable:  100-129 mg/dL   Borderline High:  130-159 mg/dL   High:  160-189 mg/dL   Very High:  >= 190 mg/dL    Non HDL Cholesterol  Desirable:  130 mg/dL  Above Desirable:  130-159 mg/dL  Borderline High:  160-189 mg/dL  High:  190-219 mg/dL  Very High:  Greater than or equal to 220 mg/dL   TSH with free T4 reflex   Result Value Ref Range    TSH 1.38 0.30 - 4.20 uIU/mL   Albumin Random Urine Quantitative with Creat Ratio   Result Value Ref Range    Creatinine Urine mg/dL 168.8 mg/dL    Albumin Urine mg/L <12.0 mg/L    Albumin Urine mg/g Cr         ASSESSMENT/PLAN:   (Z00.01) Encounter for routine adult medical exam with abnormal findings  Comment: concerned about her weight gain.  Plan: see below.    (M85.851,  M85.852) Osteopenia of both hips  Comment: needs to continue on her fosamax due to her recent fractures.   Plan: alendronate (FOSAMAX) 35 MG tablet        Refills sent.     (E78.2) Mixed hyperlipidemia  Comment: labs obtained  Plan: Comprehensive metabolic panel (BMP + Alb, Alk         Phos, ALT, AST, Total. Bili, TP), Lipid panel         reflex to direct LDL Fasting, atorvastatin         (LIPITOR) 10 MG tablet        Continue current dose.     (I10) Benign essential hypertension  Comment: stable on her cozaar  Plan: Albumin Random Urine Quantitative with Creat         Ratio, losartan (COZAAR) 100 MG tablet        Refills sent labs drawn.     (F41.1) TITA (generalized anxiety  disorder)  Comment: stable on her sertraline.   Plan: sertraline (ZOLOFT) 100 MG tablet        Refills sent.     (R63.5) Weight gain  Comment: was very inactive during her recovery from her fracture.    Plan: TSH with free T4 reflex        Labs obtained, discussed increasing her activity levels now and monitoring caloric intake.     Patient has been advised of split billing requirements and indicates understanding: Yes      COUNSELING:  Reviewed preventive health counseling, as reflected in patient instructions       Regular exercise       Healthy diet/nutrition       Vision screening       Alcohol Use       Osteoporosis prevention/bone health        She reports that she quit smoking about 2 years ago. Her smoking use included cigarettes. She started smoking about 13 years ago. She has never used smokeless tobacco.            Electronically signed by:  Galindo Esparza M.D.  9/7/2023

## 2023-11-16 ENCOUNTER — TELEPHONE (OUTPATIENT)
Dept: FAMILY MEDICINE | Facility: CLINIC | Age: 58
End: 2023-11-16
Payer: COMMERCIAL

## 2023-11-16 DIAGNOSIS — M85.851 OSTEOPENIA OF BOTH HIPS: Primary | ICD-10-CM

## 2023-11-16 DIAGNOSIS — M85.852 OSTEOPENIA OF BOTH HIPS: Primary | ICD-10-CM

## 2023-11-16 NOTE — TELEPHONE ENCOUNTER
Patient calling requesting order for DEXA, she is due for this.    She has had 2 fractures in the last year, most recently a humeral head fracture.    She was told to ask her PCP to order another DEXA scan to compare with previous and also to discuss bone health.     Order pended    Viviane Minaya XRO/

## 2023-11-24 ENCOUNTER — HOSPITAL ENCOUNTER (OUTPATIENT)
Dept: BONE DENSITY | Facility: CLINIC | Age: 58
Discharge: HOME | End: 2023-11-24
Attending: FAMILY MEDICINE | Admitting: FAMILY MEDICINE
Payer: COMMERCIAL

## 2023-11-24 DIAGNOSIS — M85.852 OSTEOPENIA OF BOTH HIPS: ICD-10-CM

## 2023-11-24 DIAGNOSIS — M85.851 OSTEOPENIA OF BOTH HIPS: ICD-10-CM

## 2023-11-24 PROCEDURE — 77080 DXA BONE DENSITY AXIAL: CPT

## 2024-05-02 ENCOUNTER — PATIENT OUTREACH (OUTPATIENT)
Dept: CARE COORDINATION | Facility: CLINIC | Age: 59
End: 2024-05-02
Payer: COMMERCIAL

## 2024-05-30 ENCOUNTER — PATIENT OUTREACH (OUTPATIENT)
Dept: CARE COORDINATION | Facility: CLINIC | Age: 59
End: 2024-05-30
Payer: COMMERCIAL

## 2024-08-03 ENCOUNTER — HEALTH MAINTENANCE LETTER (OUTPATIENT)
Age: 59
End: 2024-08-03

## 2024-08-08 ENCOUNTER — PATIENT OUTREACH (OUTPATIENT)
Dept: CARE COORDINATION | Facility: CLINIC | Age: 59
End: 2024-08-08
Payer: COMMERCIAL

## 2024-08-22 ENCOUNTER — PATIENT OUTREACH (OUTPATIENT)
Dept: CARE COORDINATION | Facility: CLINIC | Age: 59
End: 2024-08-22
Payer: COMMERCIAL

## 2024-10-12 ENCOUNTER — HEALTH MAINTENANCE LETTER (OUTPATIENT)
Age: 59
End: 2024-10-12

## 2024-10-16 DIAGNOSIS — M85.851 OSTEOPENIA OF BOTH HIPS: ICD-10-CM

## 2024-10-16 DIAGNOSIS — M85.852 OSTEOPENIA OF BOTH HIPS: ICD-10-CM

## 2024-10-16 RX ORDER — ALENDRONATE SODIUM 35 MG/1
35 TABLET ORAL
Qty: 12 TABLET | Refills: 0 | Status: SHIPPED | OUTPATIENT
Start: 2024-10-16 | End: 2024-11-12

## 2024-10-26 ENCOUNTER — NURSE TRIAGE (OUTPATIENT)
Dept: NURSING | Facility: CLINIC | Age: 59
End: 2024-10-26
Payer: COMMERCIAL

## 2024-10-26 NOTE — TELEPHONE ENCOUNTER
"\"I have had a terrible chest cold, cough x 4 days. Home COVID test has not been done yet. I have a sciatic nerve issue now: I can't walk. Buttock pain that radiates down the right leg to my heel. Crying in pain.   I have tried Tylenol, Ibuprofen, pain medication from when I broke my leg, Cyclobenzaprine. Nothing is working. I have tried heat and cold. Pain currently=   \"10+ if I am walking, sitting is OK\". She is walking short distances in her home.     Triaged to a disposition of  Go to ED now. She is considering whether to go to ER now or later. She wants to wait until Monday if possible.     Sussy Mark RN Triage Nurse Advisor 2:08 PM 10/26/2024  Reason for Disposition   Can't stand (bear weight) or walk    Additional Information   Negative: Looks like a broken bone or dislocated joint (e.g., crooked or deformed)   Negative: Sounds like a life-threatening emergency to the triager   Negative: Followed a hip injury   Negative: Leg pain is main symptom   Negative: Back pain radiating (shooting) into hip   Negative: [1] SEVERE pain (e.g., excruciating, unable to do any normal activities) AND [2] fever    Protocols used: Hip Pain-A-AH    "

## 2024-11-04 ENCOUNTER — VIRTUAL VISIT (OUTPATIENT)
Dept: URGENT CARE | Facility: CLINIC | Age: 59
End: 2024-11-04
Payer: COMMERCIAL

## 2024-11-04 DIAGNOSIS — M54.41 ACUTE RIGHT-SIDED LOW BACK PAIN WITH RIGHT-SIDED SCIATICA: Primary | ICD-10-CM

## 2024-11-04 PROCEDURE — 99213 OFFICE O/P EST LOW 20 MIN: CPT | Mod: 95

## 2024-11-04 RX ORDER — CYCLOBENZAPRINE HCL 5 MG
5 TABLET ORAL 3 TIMES DAILY PRN
Qty: 14 TABLET | Refills: 0 | Status: SHIPPED | OUTPATIENT
Start: 2024-11-04 | End: 2024-11-11

## 2024-11-04 RX ORDER — PREDNISONE 20 MG/1
40 TABLET ORAL DAILY
Qty: 10 TABLET | Refills: 0 | Status: SHIPPED | OUTPATIENT
Start: 2024-11-04 | End: 2024-11-09

## 2024-11-04 NOTE — PROGRESS NOTES
Lorna is a 59 year old female who presents for a billable video visit.    ASSESSMENT/PLAN:    ICD-10-CM    1. Acute right-sided low back pain with right-sided sciatica  M54.41         Based on history and exam, the most likely etiology of this patient's back pain is right sided sciatica.  Emergent MRI is not indicated as this patient does not have new weakness,  or cauda equina syndrome, or bowel or bladder incontinence.  Prescription for prednisone burst x 5 days and muscle relaxant sent to local pharmacy; side effects medication was discussed in detail.  Will have patient trial half a tab first of muscle relaxant.  Supportive measures also outlined in after visit summary and discussed.    Patient to follow up with PCP if no improvement over the next 7-14 days.      Will seek emergency care with new weakness/paresthesias, loss of continence, fever or worsening symptoms.      Patient verbalized understanding and is agreeable to plan.           Follow up with primary care provider with any problems, questions or concerns or if symptoms worsen or fail to improve. Patient verbalized understanding and is agreeable to plan.     SUBJECTIVE:  Lorna Gorman is a 59 year old who presents for right buttock pain for 1 days.  Pain is positional with walking, bending and lifting and is with radiation down the legs.   Precipitating factors: none recalled by the patient.   Prior history of back problems: recurrent self limited episodes of low back pain in the past.   There is no fever/chills, urinary symptoms, numbness in the legs, change in bowel/bladder, or cauda equina symptoms.  No recent trauma/injury.       OTC:  Ice, heat, Excedrin, Oxycodone (previous prescription for fracture)     Review of Systems  All systems reviewed and negative except per HPI.      OBJECTIVE:  Vitals not done due to this being a virtual visit    GENERAL: alert and no distress  EYES: Eyes grossly normal to inspection.  No discharge or erythema, or  obvious scleral/conjunctival abnormalities.  RESP: No audible wheeze, cough, or visible cyanosis.    SKIN: Visible skin clear. No significant rash, abnormal pigmentation or lesions.  Comprehensive back pain exam:  Tenderness of right buttock, Range of motion not limited by pain, and Lower extremity sensation normal and equal on both sides  PSYCH: Appropriate affect, tone, and pace of words    Video-Visit Details    Type of service:  Video Visit  Video Start Time: 1:02 PM  Video End Time: 1:22    Originating Location (pt. Location): Home    Distant Location (provider location):  [facility]     Platform used for Video Visit: [employer]

## 2024-11-04 NOTE — PATIENT INSTRUCTIONS
Perform hot epsom salt soaks, light stretching, lidocaine patches as needed.     Take muscle relaxer as directed. This can make the patient sleepy, so do not drive while on it or perform important functions.     Ibuprofen 800 mg (4 of the 200 mg OTC tablets or 800 mg of the children's liquid) up to 3 times daily with food which may be alternated with Tylenol 500 to 1000 mg every 8 hours as needed. This would mean Ibuprofen, 4 hours later may take Tylenol, then 4 hours after Tylenol you may take Ibuprofen, and so on. If pain more managed, decrease doses.

## 2025-01-03 ENCOUNTER — HOSPITAL ENCOUNTER (OUTPATIENT)
Dept: MAMMOGRAPHY | Facility: CLINIC | Age: 60
Discharge: HOME | End: 2025-01-03
Attending: FAMILY MEDICINE | Admitting: FAMILY MEDICINE
Payer: COMMERCIAL

## 2025-01-03 DIAGNOSIS — Z12.31 VISIT FOR SCREENING MAMMOGRAM: ICD-10-CM

## 2025-01-03 PROCEDURE — 77063 BREAST TOMOSYNTHESIS BI: CPT

## 2025-01-27 SDOH — HEALTH STABILITY: PHYSICAL HEALTH: ON AVERAGE, HOW MANY DAYS PER WEEK DO YOU ENGAGE IN MODERATE TO STRENUOUS EXERCISE (LIKE A BRISK WALK)?: 1 DAY

## 2025-01-27 SDOH — HEALTH STABILITY: PHYSICAL HEALTH: ON AVERAGE, HOW MANY MINUTES DO YOU ENGAGE IN EXERCISE AT THIS LEVEL?: 10 MIN

## 2025-01-27 ASSESSMENT — SOCIAL DETERMINANTS OF HEALTH (SDOH): HOW OFTEN DO YOU GET TOGETHER WITH FRIENDS OR RELATIVES?: ONCE A WEEK

## 2025-01-29 ENCOUNTER — OFFICE VISIT (OUTPATIENT)
Dept: FAMILY MEDICINE | Facility: CLINIC | Age: 60
End: 2025-01-29
Payer: COMMERCIAL

## 2025-01-29 VITALS
OXYGEN SATURATION: 98 % | SYSTOLIC BLOOD PRESSURE: 138 MMHG | DIASTOLIC BLOOD PRESSURE: 102 MMHG | BODY MASS INDEX: 25.11 KG/M2 | RESPIRATION RATE: 16 BRPM | HEART RATE: 109 BPM | WEIGHT: 160 LBS | TEMPERATURE: 98.7 F | HEIGHT: 67 IN

## 2025-01-29 DIAGNOSIS — I10 BENIGN ESSENTIAL HYPERTENSION: ICD-10-CM

## 2025-01-29 DIAGNOSIS — Z00.00 ROUTINE GENERAL MEDICAL EXAMINATION AT A HEALTH CARE FACILITY: Primary | ICD-10-CM

## 2025-01-29 DIAGNOSIS — F41.1 GAD (GENERALIZED ANXIETY DISORDER): ICD-10-CM

## 2025-01-29 DIAGNOSIS — Z12.4 CERVICAL CANCER SCREENING: ICD-10-CM

## 2025-01-29 DIAGNOSIS — N95.1 MENOPAUSAL SYNDROME (HOT FLASHES): ICD-10-CM

## 2025-01-29 DIAGNOSIS — M54.50 LOW BACK PAIN RADIATING TO RIGHT LOWER EXTREMITY: ICD-10-CM

## 2025-01-29 DIAGNOSIS — M85.852 OSTEOPENIA OF BOTH HIPS: ICD-10-CM

## 2025-01-29 DIAGNOSIS — R06.83 SNORING: ICD-10-CM

## 2025-01-29 DIAGNOSIS — F17.200 NICOTINE DEPENDENCE, UNCOMPLICATED, UNSPECIFIED NICOTINE PRODUCT TYPE: ICD-10-CM

## 2025-01-29 DIAGNOSIS — R87.810 ASCUS WITH POSITIVE HIGH RISK HPV CERVICAL: ICD-10-CM

## 2025-01-29 DIAGNOSIS — Z82.49 FAMILY HISTORY OF ISCHEMIC HEART DISEASE: ICD-10-CM

## 2025-01-29 DIAGNOSIS — Z12.11 SCREEN FOR COLON CANCER: ICD-10-CM

## 2025-01-29 DIAGNOSIS — F33.0 MILD EPISODE OF RECURRENT MAJOR DEPRESSIVE DISORDER: ICD-10-CM

## 2025-01-29 DIAGNOSIS — Z80.0 FAMILY HISTORY OF COLON CANCER: ICD-10-CM

## 2025-01-29 DIAGNOSIS — M79.604 LOW BACK PAIN RADIATING TO RIGHT LOWER EXTREMITY: ICD-10-CM

## 2025-01-29 DIAGNOSIS — I10 HYPERTENSION GOAL BP (BLOOD PRESSURE) < 140/90: ICD-10-CM

## 2025-01-29 DIAGNOSIS — Z72.0 TOBACCO ABUSE DISORDER: ICD-10-CM

## 2025-01-29 DIAGNOSIS — R87.610 ASCUS WITH POSITIVE HIGH RISK HPV CERVICAL: ICD-10-CM

## 2025-01-29 DIAGNOSIS — E78.2 MIXED HYPERLIPIDEMIA: ICD-10-CM

## 2025-01-29 DIAGNOSIS — M85.851 OSTEOPENIA OF BOTH HIPS: ICD-10-CM

## 2025-01-29 LAB
ANION GAP SERPL CALCULATED.3IONS-SCNC: 15 MMOL/L (ref 7–15)
BUN SERPL-MCNC: 23.1 MG/DL (ref 8–23)
CALCIUM SERPL-MCNC: 9.3 MG/DL (ref 8.8–10.4)
CHLORIDE SERPL-SCNC: 99 MMOL/L (ref 98–107)
CHOLEST SERPL-MCNC: 310 MG/DL
CREAT SERPL-MCNC: 0.73 MG/DL (ref 0.51–0.95)
EGFRCR SERPLBLD CKD-EPI 2021: >90 ML/MIN/1.73M2
FASTING STATUS PATIENT QL REPORTED: YES
FASTING STATUS PATIENT QL REPORTED: YES
GLUCOSE SERPL-MCNC: 105 MG/DL (ref 70–99)
HCO3 SERPL-SCNC: 24 MMOL/L (ref 22–29)
HDLC SERPL-MCNC: 146 MG/DL
LDLC SERPL CALC-MCNC: 142 MG/DL
NONHDLC SERPL-MCNC: 164 MG/DL
POTASSIUM SERPL-SCNC: 4 MMOL/L (ref 3.4–5.3)
SODIUM SERPL-SCNC: 138 MMOL/L (ref 135–145)
TRIGL SERPL-MCNC: 110 MG/DL

## 2025-01-29 PROCEDURE — 99396 PREV VISIT EST AGE 40-64: CPT | Performed by: STUDENT IN AN ORGANIZED HEALTH CARE EDUCATION/TRAINING PROGRAM

## 2025-01-29 PROCEDURE — 80061 LIPID PANEL: CPT | Performed by: STUDENT IN AN ORGANIZED HEALTH CARE EDUCATION/TRAINING PROGRAM

## 2025-01-29 PROCEDURE — 99406 BEHAV CHNG SMOKING 3-10 MIN: CPT | Performed by: STUDENT IN AN ORGANIZED HEALTH CARE EDUCATION/TRAINING PROGRAM

## 2025-01-29 PROCEDURE — 99214 OFFICE O/P EST MOD 30 MIN: CPT | Mod: 25 | Performed by: STUDENT IN AN ORGANIZED HEALTH CARE EDUCATION/TRAINING PROGRAM

## 2025-01-29 PROCEDURE — 80048 BASIC METABOLIC PNL TOTAL CA: CPT | Performed by: STUDENT IN AN ORGANIZED HEALTH CARE EDUCATION/TRAINING PROGRAM

## 2025-01-29 PROCEDURE — 36415 COLL VENOUS BLD VENIPUNCTURE: CPT | Performed by: STUDENT IN AN ORGANIZED HEALTH CARE EDUCATION/TRAINING PROGRAM

## 2025-01-29 RX ORDER — ALENDRONATE SODIUM 35 MG/1
35 TABLET ORAL
Qty: 4 TABLET | Refills: 11 | Status: SHIPPED | OUTPATIENT
Start: 2025-01-29

## 2025-01-29 RX ORDER — BUPROPION HYDROCHLORIDE 150 MG/1
150 TABLET ORAL EVERY MORNING
Qty: 90 TABLET | Refills: 1 | Status: SHIPPED | OUTPATIENT
Start: 2025-01-29

## 2025-01-29 RX ORDER — NICOTINE 21 MG/24HR
1 PATCH, TRANSDERMAL 24 HOURS TRANSDERMAL EVERY 24 HOURS
Qty: 42 PATCH | Refills: 0 | Status: SHIPPED | OUTPATIENT
Start: 2025-01-29 | End: 2025-03-12

## 2025-01-29 RX ORDER — LOSARTAN POTASSIUM 100 MG/1
100 TABLET ORAL DAILY
Qty: 90 TABLET | Refills: 4 | Status: SHIPPED | OUTPATIENT
Start: 2025-01-29

## 2025-01-29 RX ORDER — SERTRALINE HYDROCHLORIDE 100 MG/1
100 TABLET, FILM COATED ORAL DAILY
Qty: 90 TABLET | Refills: 4 | Status: SHIPPED | OUTPATIENT
Start: 2025-01-29

## 2025-01-29 RX ORDER — ATORVASTATIN CALCIUM 10 MG/1
10 TABLET, FILM COATED ORAL DAILY
Qty: 90 TABLET | Refills: 4 | Status: SHIPPED | OUTPATIENT
Start: 2025-01-29

## 2025-01-29 RX ORDER — CARVEDILOL 3.12 MG/1
3.12 TABLET ORAL 2 TIMES DAILY WITH MEALS
Qty: 180 TABLET | Refills: 3 | Status: SHIPPED | OUTPATIENT
Start: 2025-01-29

## 2025-01-29 ASSESSMENT — PAIN SCALES - GENERAL: PAINLEVEL_OUTOF10: NO PAIN (0)

## 2025-01-29 NOTE — PATIENT INSTRUCTIONS
Nicotine Transdermal System   Habitrol, Nicoderm C-Q    Uses  For quitting smoking.    Instructions  DO NOT take this medicine by mouth.    Avoid placing the patch near the breast.    Remove the patch after 24 hours.    Keep the medicine at room temperature. Avoid heat and direct light.    This patch should not be cut.    Wash your hands before and after handling this medicine.    Remove old patch before applying new one. Change the location of the new patch.    If you have vivid dreams or trouble sleeping, you may remove the patch before going to sleep.    Ask your doctor or pharmacist about locations on your body where this patch can be used.    Remove the plastic liner that protects the sticky side of the patch before applying to the skin.    Be sure the area of skin is clean and dry before putting on a new patch.    Apply the patch to a clean, dry, hairless area.    Press the patch firmly for a few seconds to make sure it stays in place.    After removing the patch, fold it together and discard it out of reach of children and pets.    Please ask your doctor or pharmacist how you can safely dispose of used patches.    If the skin under the patch becomes irritated, remove the patch. Do not apply a new patch to the area until the skin feels better.    To avoid irritating your skin, use a different location for a new patch.    Apply the patch only to normal looking skin. Avoid areas of the skin that are red, have scrapes, or damaged.    If the patch falls off, apply a new a patch on a different location of the body.    Please tell your doctor and pharmacist about all the medicines you take. Include both prescription and over-the-counter medicines. Also tell them about any vitamins, herbal medicines, or anything else you take for your health.    If you need to stop this medicine, your doctor may wish to gradually reduce the dosage before stopping.    Do not use more than 1 patch at any one time.    Cautions  Tell  your doctor and pharmacist if you ever had an allergic reaction to a medicine. Symptoms of an allergic reaction can include trouble breathing, skin rash, itching, swelling, or severe dizziness.    Do not use the medication any more than instructed.    Avoid smoking while on this medicine. Smoking may increase your risk for stroke, heart attack, blood clots, high blood pressure, and other diseases of the heart and blood vessels.    Tell the doctor or pharmacist if you are pregnant, planning to be pregnant, or breastfeeding.    Ask your pharmacist if this medicine can interact with any of your other medicines. Be sure to tell them about all the medicines you take.    Please tell all your doctors and dentists that you are on this medicine before they provide care.    Side Effects  The following is a list of some common side effects from this medicine. Please speak with your doctor about what you should do if you experience these or other side effects.    skin irritation where medicine is applied    If you have any of the following side effects, you may be getting too much medicine. Please contact your doctor to let them know about these side effects.    diarrhea  dizziness  nausea  rapid heartbeat  vomiting    A few people may have an allergic reaction to this medicine. Symptoms can include difficulty breathing, skin rash, itching, swelling, or severe dizziness. If you notice any of these symptoms, seek medical help quickly.    Extra  Please speak with your doctor, nurse, or pharmacist if you have any questions about this medicine.      [email or web address]  IMPORTANT NOTE: This document tells you briefly how to take your medicine, but it does not tell you all there is to know about it. Your doctor or pharmacist may give you other documents about your medicine. Please talk to them if you have any questions. Always follow their advice. There is a more complete description of this medicine  available in English. Scan this code on your smartphone or tablet or use the web address below. You can also ask your pharmacist for a printout. If you have any questions, please ask your pharmacist.   2021 [employer].      0088-5631 [employer]. All rights reserved. This information is not intended as a substitute for professional medical care. Always follow your healthcare professional's instructions.    Patient Education   Preventive Care Advice   This is general advice given by our system to help you stay healthy. However, your care team may have specific advice just for you. Please talk to your care team about your preventive care needs.  Nutrition  Eat 5 or more servings of fruits and vegetables each day.  Try wheat bread, brown rice and whole grain pasta (instead of white bread, rice, and pasta).  Get enough calcium and vitamin D. Check the label on foods and aim for 100% of the RDA (recommended daily allowance).  Lifestyle  Exercise at least 150 minutes each week  (30 minutes a day, 5 days a week).  Do muscle strengthening activities 2 days a week. These help control your weight and prevent disease.  No smoking.  Wear sunscreen to prevent skin cancer.  Have a dental exam and cleaning every 6 months.  Yearly exams  See your health care team every year to talk about:  Any changes in your health.  Any medicines your care team has prescribed.  Preventive care, family planning, and ways to prevent chronic diseases.  Shots (vaccines)   HPV shots (up to age 26), if you've never had them before.  Hepatitis B shots (up to age 59), if you've never had them before.  COVID-19 shot: Get this shot when it's due.  Flu shot: Get a flu shot every year.  Tetanus shot: Get a tetanus shot every 10 years.  Pneumococcal, hepatitis A, and RSV shots: Ask your care team if you need these based on your risk.  Shingles shot (for age 50 and up)  General health tests  Diabetes screening:  Starting at age 35, Get  screened for diabetes at least every 3 years.  If you are younger than age 35, ask your care team if you should be screened for diabetes.  Cholesterol test: At age 39, start having a cholesterol test every 5 years, or more often if advised.  Bone density scan (DEXA): At age 50, ask your care team if you should have this scan for osteoporosis (brittle bones).  Hepatitis C: Get tested at least once in your life.  STIs (sexually transmitted infections)  Before age 24: Ask your care team if you should be screened for STIs.  After age 24: Get screened for STIs if you're at risk. You are at risk for STIs (including HIV) if:  You are sexually active with more than one person.  You don't use condoms every time.  You or a partner was diagnosed with a sexually transmitted infection.  If you are at risk for HIV, ask about PrEP medicine to prevent HIV.  Get tested for HIV at least once in your life, whether you are at risk for HIV or not.  Cancer screening tests  Cervical cancer screening: If you have a cervix, begin getting regular cervical cancer screening tests starting at age 21.  Breast cancer scan (mammogram): If you've ever had breasts, begin having regular mammograms starting at age 40. This is a scan to check for breast cancer.  Colon cancer screening: It is important to start screening for colon cancer at age 45.  Have a colonoscopy test every 10 years (or more often if you're at risk) Or, ask your provider about stool tests like a FIT test every year or Cologuard test every 3 years.  To learn more about your testing options, visit:   .  For help making a decision, visit:   https://bit.ly/xu52415.  Prostate cancer screening test: If you have a prostate, ask your care team if a prostate cancer screening test (PSA) at age 55 is right for you.  Lung cancer screening: If you are a current or former smoker ages 50 to 80, ask your care team if ongoing lung cancer screenings are right for you.  For informational purposes  only. Not to replace the advice of your health care provider. Copyright   2023 [facility]. All rights reserved. Clinically reviewed by the [facility]. [employer] [id number] - REV 01/24.  Preventing Falls: Care Instructions  Injuries and health problems such as trouble walking or poor eyesight can increase your risk of falling. So can some medicines. But there are things you can do to help prevent falls. You can exercise to get stronger. You can also arrange your home to make it safer.    Talk to your doctor about the medicines you take. Ask if any of them increase the risk of falls and whether they can be changed or stopped.   Try to exercise regularly. It can help improve your strength and balance. This can help lower your risk of falling.         Practice fall safety and prevention.   Wear low-heeled shoes that fit well and give your feet good support. Talk to your doctor if you have foot problems that make this hard.  Carry a cellphone or wear a medical alert device that you can use to call for help.  Use stepladders instead of chairs to reach high objects. Don't climb if you're at risk for falls. Ask for help, if needed.  Wear the correct eyeglasses, if you need them.        Make your home safer.   Remove rugs, cords, clutter, and furniture from walkways.  Keep your house well lit. Use night-lights in hallways and bathrooms.  Install and use sturdy handrails on stairways.  Wear nonskid footwear, even inside. Don't walk barefoot or in socks without shoes.        Be safe outside.   Use handrails, curb cuts, and ramps whenever possible.  Keep your hands free by using a shoulder bag or backpack.  Try to walk in well-lit areas. Watch out for uneven ground, changes in pavement, and debris.  Be careful in the winter. Walk on the grass or gravel when sidewalks are slippery. Use de-icer on steps and walkways. Add non-slip devices to shoes.    Put grab bars and nonskid mats in your  "shower or tub and near the toilet. Try to use a shower chair or bath bench when bathing.   Get into a tub or shower by putting in your weaker leg first. Get out with your strong side first. Have a phone or medical alert device in the bathroom with you.   Where can you learn more?  Go to https://www.[employer].net/patiented  Enter G117 in the search box to learn more about \"Preventing Falls: Care Instructions.\"  Current as of: July 31, 2024  Content Version: 14.3    2024 [employer].   Care instructions adapted under license by your healthcare professional. If you have questions about a medical condition or this instruction, always ask your healthcare professional. [employer] disclaims any warranty or liability for your use of this information.    Learning About Stress  What is stress?     Stress is your body's response to a hard situation. Your body can have a physical, emotional, or mental response. Stress is a fact of life for most people, and it affects everyone differently. What causes stress for you may not be stressful for someone else.  A lot of things can cause stress. You may feel stress when you go on a job interview, take a test, or run a race. This kind of short-term stress is normal and even useful. It can help you if you need to work hard or react quickly. For example, stress can help you finish an important job on time.  Long-term stress is caused by ongoing stressful situations or events. Examples of long-term stress include long-term health problems, ongoing problems at work, or conflicts in your family. Long-term stress can harm your health.  How does stress affect your health?  When you are stressed, your body responds as though you are in danger. It makes hormones that speed up your heart, make you breathe faster, and give you a burst of energy. This is called the fight-or-flight stress response. If the stress is over quickly, your body goes back to normal and no harm is " done.  But if stress happens too often or lasts too long, it can have bad effects. Long-term stress can make you more likely to get sick, and it can make symptoms of some diseases worse. If you tense up when you are stressed, you may develop neck, shoulder, or low back pain. Stress is linked to high blood pressure and heart disease.  Stress also harms your emotional health. It can make you leo, tense, or depressed. Your relationships may suffer, and you may not do well at work or school.  What can you do to manage stress?  You can try these things to help manage stress:   Do something active. Exercise or activity can help reduce stress. Walking is a great way to get started. Even everyday activities such as housecleaning or yard work can help.  Try yoga or kranthi chi. These techniques combine exercise and meditation. You may need some training at first to learn them.  Do something you enjoy. For example, listen to music or go to a movie. Practice your hobby or do volunteer work.  Meditate. This can help you relax, because you are not worrying about what happened before or what may happen in the future.  Do guided imagery. Imagine yourself in any setting that helps you feel calm. You can use online videos, books, or a teacher to guide you.  Do breathing exercises. For example:  From a standing position, bend forward from the waist with your knees slightly bent. Let your arms dangle close to the floor.  Breathe in slowly and deeply as you return to a standing position. Roll up slowly and lift your head last.  Hold your breath for just a few seconds in the standing position.  Breathe out slowly and bend forward from the waist.  Let your feelings out. Talk, laugh, cry, and express anger when you need to. Talking with supportive friends or family, a counselor, or a emelia leader about your feelings is a healthy way to relieve stress. Avoid discussing your feelings with people who make you feel worse.  Write. It may help to  "write about things that are bothering you. This helps you find out how much stress you feel and what is causing it. When you know this, you can find better ways to cope.  What can you do to prevent stress?  You might try some of these things to help prevent stress:  Manage your time. This helps you find time to do the things you want and need to do.  Get enough sleep. Your body recovers from the stresses of the day while you are sleeping.  Get support. Your family, friends, and community can make a difference in how you experience stress.  Limit your news feed. Avoid or limit time on social media or news that may make you feel stressed.  Do something active. Exercise or activity can help reduce stress. Walking is a great way to get started.  Where can you learn more?  Go to https://www.Instagram.net/patiented  Enter N032 in the search box to learn more about \"Learning About Stress.\"  Current as of: October 24, 2023  Content Version: 14.3    2024 [employer].   Care instructions adapted under license by your healthcare professional. If you have questions about a medical condition or this instruction, always ask your healthcare professional. [employer] disclaims any warranty or liability for your use of this information.       "

## 2025-01-29 NOTE — PROGRESS NOTES
Preventive Care Visit  Formerly [facility]  Vito Silver MD, Family Medicine  Jan 29, 2025      Assessment & Plan   Problem List Items Addressed This Visit          Endocrine    Mixed hyperlipidemia    Relevant Medications    atorvastatin (LIPITOR) 10 MG tablet    Other Relevant Orders    Lipid panel reflex to direct LDL Non-fasting       Circulatory    Benign essential hypertension    Relevant Medications    losartan (COZAAR) 100 MG tablet    Hypertension goal BP (blood pressure) < 140/90    Relevant Medications    losartan (COZAAR) 100 MG tablet    carvedilol (COREG) 3.125 MG tablet    Other Relevant Orders    Basic metabolic panel  (Ca, Cl, CO2, Creat, Gluc, K, Na, BUN)    Menopausal syndrome (hot flashes)       Behavioral    Tobacco abuse disorder    TITA (generalized anxiety disorder)    Relevant Medications    sertraline (ZOLOFT) 100 MG tablet    buPROPion (WELLBUTRIN XL) 150 MG 24 hr tablet    Mild episode of recurrent major depressive disorder    Relevant Medications    sertraline (ZOLOFT) 100 MG tablet    buPROPion (WELLBUTRIN XL) 150 MG 24 hr tablet       Other    ASCUS with positive high risk HPV cervical    Family history of colon cancer    Family history of ischemic heart disease     Other Visit Diagnoses       Routine general medical examination at a health care facility    -  Primary    Cervical cancer screening        Relevant Orders    HPV and Gynecologic Cytology Panel    Screen for colon cancer        Relevant Orders    Colonoscopy Screening  Referral    Osteopenia of both hips        Relevant Medications    alendronate (FOSAMAX) 35 MG tablet    Nicotine dependence, uncomplicated, unspecified nicotine product type        Relevant Medications    nicotine (NICODERM CQ) 14 MG/24HR 24 hr patch    nicotine (NICODERM CQ) 7 MG/24HR 24 hr patch (Start on 3/12/2025)    Other Relevant Orders    [place] Quit Partner Referral    SMOKING CESSATION COUNSELING 3-10 MIN  "(Completed)    Snoring        Low back pain radiating to right lower extremity               History reviewed and updated.  Age-appropriate screening and immunization reviewed.  We did update Pap today.  She does not want any immunizations.  Importance of diet and exercise reviewed.  Her back is getting better but we did discuss home exercises she can work on I would have her see physical therapy if things not improving.  Blood pressure remains elevated and we will plan to add carvedilol twice daily now with plan to increase pending home pressures in 1 week.  She will message on MyChart and plan to follow-up in clinic in 4 weeks to compare her cuff.  We did discuss her smoking and strongly encouraged cessation.  Congratulated her on previous stents she did go 2 years at 1 point.  Will plan to add Wellbutrin now to help with cravings as well as her depression.  Follow-up in 2 months but sooner if new or worsening issues arise.  If blood pressure remains elevated on recheck will double dose of carvedilol.      Patient has been advised of split billing requirements and indicates understanding: Yes       BMI  Estimated body mass index is 25.44 kg/m  as calculated from the following:    Height as of this encounter: 1.689 m (5' 6.5\").    Weight as of this encounter: 72.6 kg (160 lb).   Weight management plan: Discussed healthy diet and exercise guidelines    Counseling  Appropriate preventive services were addressed with this patient via screening, questionnaire, or discussion as appropriate for fall prevention, nutrition, physical activity, Tobacco-use cessation, social engagement, weight loss and cognition.  Checklist reviewing preventive services available has been given to the patient.  Reviewed patient's diet, addressing concerns and/or questions.   She is at risk for lack of exercise and has been provided with information to increase physical activity for the benefit of her well-being.   She is at risk for " psychosocial distress and has been provided with information to reduce risk.         Subjective   Lorna is a 59 year old, presenting for the following:  Physical (Labs, rx refills )        1/29/2025    10:41 AM   Additional Questions   Roomed by Nirali MARTINEZ      Pain in right lower back shooting down her leg since illness with coughing in October. Got sick again in December and things came back again. She is doing stretching at home. Does note a lot of stress at home with losing family members and mom with dementia. Mood and anxiety much worse and she would like to adjust things. She is not checking her blood pressure. She is menopausal with some hot flashes.     Health Care Directive  Patient does not have a Health Care Directive: Discussed advance care planning with patient; information given to patient to review.      1/27/2025   General Health   How would you rate your overall physical health? Good   Feel stress (tense, anxious, or unable to sleep) Rather much   (!) STRESS CONCERN      1/27/2025   Nutrition   Three or more servings of calcium each day? (!) NO   Diet: I don't know   How many servings of fruit and vegetables per day? (!) 0-1   How many sweetened beverages each day? 0-1         1/27/2025   Exercise   Days per week of moderate/strenous exercise 1 day   Average minutes spent exercising at this level 10 min   (!) EXERCISE CONCERN      1/27/2025   Social Factors   Frequency of gathering with friends or relatives Once a week   Worry food won't last until get money to buy more No   Food not last or not have enough money for food? No   Do you have housing? (Housing is defined as stable permanent housing and does not include staying ouside in a car, in a tent, in an abandoned building, in an overnight shelter, or couch-surfing.) No   Are you worried about losing your housing? No   Lack of transportation? No   Unable to get utilities (heat,electricity)? No   Want help with housing or utility concern?  No   (!) HOUSING CONCERN PRESENT      1/29/2025   Fall Risk   Gait Speed Test (Document in seconds) 2.37          1/27/2025   Dental   Dentist two times every year? Yes         1/27/2025   TB Screening   Were you born outside of the [place]? No         Today's PHQ-2 Score:       1/28/2025     2:14 PM   PHQ-2 ( 1999 [employer])   Q1: Little interest or pleasure in doing things 0   Q2: Feeling down, depressed or hopeless 1   PHQ-2 Score 1    Q1: Little interest or pleasure in doing things Not at all   Q2: Feeling down, depressed or hopeless Several days   PHQ-2 Score 1       Patient-reported           1/27/2025   Substance Use   Alcohol more than 3/day or more than 7/wk Not Applicable   Do you use any other substances recreationally? No     Social History     Tobacco Use    Smoking status: Former     Current packs/day: 0.00     Types: Cigarettes     Start date: 1/1/2010     Quit date: 9/1/2021     Years since quitting: 3.4    Smokeless tobacco: Never    Tobacco comments:     I QUIT!!   Vaping Use    Vaping status: Never Used   Substance Use Topics    Alcohol use: Yes     Alcohol/week: 7.0 standard drinks of alcohol     Types: 7 Glasses of wine per week     Comment: daily 1-2 wine    Drug use: No           1/3/2025   LAST FHS-7 RESULTS   1st degree relative breast or ovarian cancer No   Any relative bilateral breast cancer No   Any male have breast cancer No   Any ONE woman have BOTH breast AND ovarian cancer No   Any woman with breast cancer before 50yrs No   2 or more relatives with breast AND/OR ovarian cancer No   2 or more relatives with breast AND/OR bowel cancer No        Mammogram Screening - Mammogram every 1-2 years updated in Health Maintenance based on mutual decision making        1/27/2025   STI Screening   New sexual partner(s) since last STI/HIV test? No     History of abnormal Pap smear: Hx of ASCUS and HPV, repeat now.         Latest Ref Rng & Units 2/25/2021    11:00 AM 2/25/2021    10:58 AM 1/22/2020      2:15 PM   PAP / HPV   PAP (Historical)   NIL     HPV 16 DNA NEG^Negative Negative   Negative    HPV 18 DNA NEG^Negative Negative   Negative    Other HR HPV NEG^Negative Negative   Positive      ASCVD Risk   The 10-year ASCVD risk score (Pamela ACEVEDO, et al., 2019) is: 3.9%    Values used to calculate the score:      Age: 59 years      Sex: Female      Is Non- : No      Diabetic: No      Tobacco smoker: No      Systolic Blood Pressure: 138 mmHg      Is BP treated: Yes      HDL Cholesterol: 86 mg/dL      Total Cholesterol: 203 mg/dL    Fracture Risk Assessment Tool  Link to Frax Calculator  Use the information below to complete the Frax calculator  : 1965  Sex: female  Weight (kg): 72.6 kg (actual weight)  Height (cm): 168.9 cm  Previous Fragility Fracture:  No  History of parent with fractured hip:  No  Current Smoking:  No  Patient has been on glucocorticoids for more than 3 months (5mg/day or more): No  Rheumatoid Arthritis on Problem List:  No  Secondary Osteoporosis on Problem List:  No  Consumes 3 or more units of alcohol per day: No  Femoral Neck BMD (g/cm2)           Reviewed and updated as needed this visit by Provider                    Past Medical History:   Diagnosis Date    Abnormal Pap smear of cervix 2020    see problem list    Benign essential hypertension 2017    GENERALIZED ANXIETY DIS 05/15/2007    Hyperlipidemia with target LDL less than 130 2013    Hypertension goal BP (blood pressure) < 140/90 2017    Major depressive disorder, recurrent episode, mild 2015    Migraine, unspecified, without mention of intractable migraine without mention of status migrainosus     Moderate Depression [296.32] 2009    Premenstrual tension syndromes     PMDS on Sarafem and doing very well.    Tobacco abuse 2013     Past Surgical History:   Procedure Laterality Date    COLONOSCOPY N/A 2015    Procedure: COLONOSCOPY;  Surgeon: Aram  "Ritesh NEELY MD;  Location:  GI     HYSTEROSCOPY, SURGICAL; W/ ENDOMETRIAL ABLATION, ANY METHOD  09    OPEN REDUCTION INTERNAL FIXATION ANKLE Right 2023    Procedure: OPEN REDUCTION INTERNAL FIXATION right ankle;  Surgeon: Bruce Galicia DPM;  Location:  OR    [facility] ANESTH,NOSE,SINUS SURGERY      [facility] RESEC HEAD OF PHALANX,TOE       OB History    Para Term  AB Living   3 0 0 0 0 3   SAB IAB Ectopic Multiple Live Births   0 0 0 0 3      # Outcome Date GA Lbr Arcadio/2nd Weight Sex Type Anes PTL Lv   3  92 40w0d  2.863 kg (6 lb 5 oz) F    OLE      Name: Suma   2  88 40w0d 10:00 3.317 kg (7 lb 5 oz) F    OLE      Name: Vanessa   1  03/15/86 40w0d 04:00 3.6 kg (7 lb 15 oz) M    OLE      Name: Brennen         Review of Systems  Constitutional, HEENT, cardiovascular, pulmonary, GI, , musculoskeletal, neuro, skin, endocrine and psych systems are negative, except as otherwise noted.     Objective    Exam  BP (!) 138/102   Pulse 109   Temp 98.7  F (37.1  C) (Temporal)   Resp 16   Ht 1.689 m (5' 6.5\")   Wt 72.6 kg (160 lb)   SpO2 98%   BMI 25.44 kg/m     Estimated body mass index is 25.44 kg/m  as calculated from the following:    Height as of this encounter: 1.689 m (5' 6.5\").    Weight as of this encounter: 72.6 kg (160 lb).    Physical Exam  GENERAL: alert and no distress  EYES: Eyes grossly normal to inspection, PERRL and conjunctivae and sclerae normal  HENT: ear canals and TM's normal, nose and mouth without ulcers or lesions  NECK: no adenopathy, no asymmetry, masses, or scars  RESP: lungs clear to auscultation - no rales, rhonchi or wheezes  CV: regular rate and rhythm, normal S1 S2, no S3 or S4, no murmur, click or rub, no peripheral edema  ABDOMEN: soft, nontender, no hepatosplenomegaly, no masses and bowel sounds normal  MS: no gross musculoskeletal defects noted, no edema, right si joint tenderness, ROM intact  Urogenital: " Normal vaginal mucosa without lesions noted.  SKIN: no suspicious lesions or rashes  NEURO: Normal strength and tone, mentation intact and speech normal  PSYCH: mentation appears normal, affect normal/bright    Nirali present for exam    Signed Electronically by: Vito Silver MD

## 2025-02-03 LAB
BKR AP ASSOCIATED HPV REPORT: NORMAL
BKR LAB AP GYN ADEQUACY: NORMAL
BKR LAB AP GYN INTERPRETATION: NORMAL
BKR LAB AP PREVIOUS ABNL DX: NORMAL
BKR LAB AP PREVIOUS ABNORMAL: NORMAL
PATH REPORT.COMMENTS IMP SPEC: NORMAL
PATH REPORT.COMMENTS IMP SPEC: NORMAL
PATH REPORT.RELEVANT HX SPEC: NORMAL

## 2025-02-04 ENCOUNTER — PATIENT OUTREACH (OUTPATIENT)
Dept: FAMILY MEDICINE | Facility: CLINIC | Age: 60
End: 2025-02-04
Payer: COMMERCIAL

## 2025-02-04 ENCOUNTER — TELEPHONE (OUTPATIENT)
Dept: FAMILY MEDICINE | Facility: CLINIC | Age: 60
End: 2025-02-04
Payer: COMMERCIAL

## 2025-02-04 DIAGNOSIS — R87.810 ASCUS WITH POSITIVE HIGH RISK HPV CERVICAL: Primary | ICD-10-CM

## 2025-02-04 DIAGNOSIS — R87.610 ASCUS WITH POSITIVE HIGH RISK HPV CERVICAL: Primary | ICD-10-CM

## 2025-02-04 NOTE — TELEPHONE ENCOUNTER
Order/Referral Request    Who is requesting: patient    Orders being requested: Called to schedule culposcopy and was told that an order was needed. Please place order and let patient know so that she may schedule her visit.    When are orders needed by: Asap    Has this been discussed with Provider: Yes    Does patient have a preference on a Group/Provider/Facility? [facility]    Where to send orders: Place orders within Epic    Could we send this information to you in [facility] or would you prefer to receive a phone call?:   Patient would prefer a phone call   Okay to leave a detailed message?: Yes at Cell number on file:    Telephone Information:   Mobile [phone]

## 2025-02-04 NOTE — TELEPHONE ENCOUNTER
Patient is needing Order placed.    Maria D Chahal, RN   CC    2/4/25  9:02 AM  Note  1/29/25 NIL pap, Positive HR HPV 16 and other. Plan: [place] bef 4/29/25

## 2025-06-12 ENCOUNTER — TELEPHONE (OUTPATIENT)
Dept: FAMILY MEDICINE | Facility: CLINIC | Age: 60
End: 2025-06-12

## 2025-07-31 ENCOUNTER — OFFICE VISIT (OUTPATIENT)
Dept: PODIATRY | Facility: CLINIC | Age: 60
End: 2025-07-31
Payer: COMMERCIAL

## 2025-07-31 VITALS — HEIGHT: 67 IN | BODY MASS INDEX: 25.9 KG/M2 | WEIGHT: 165 LBS

## 2025-07-31 DIAGNOSIS — M24.571 EQUINUS CONTRACTURE OF RIGHT ANKLE: ICD-10-CM

## 2025-07-31 DIAGNOSIS — R26.9 ABNORMALITY OF GAIT: ICD-10-CM

## 2025-07-31 DIAGNOSIS — M54.31 SCIATICA OF RIGHT SIDE: Primary | ICD-10-CM

## 2025-07-31 ASSESSMENT — PAIN SCALES - GENERAL: PAINLEVEL_OUTOF10: MODERATE PAIN (4)

## 2025-07-31 NOTE — PATIENT INSTRUCTIONS
Reliable shoe stores: To maximize your experience and provide the best possible fit.  Be sure to show them your foot concerns and tell them Dr. Galicia sent you.      Stores listed in bold have only athletic shoes, and stores that are not bold are mostly casual or variety of shoes    [employer]  [address] [place] [address]  [phone]    [employer] [place]  [address] [place] [address]  [phone]     [employer] [place]  [address] [place] [address]  [phone]    [employer]  [address] [facility]  [facility] [address]  [phone]    [employer]  [address] [place], [place] [address] [phone]    [employer]  [address] [place] [address]  [phone]                         [employer] Locations:     [address] [place] [address]   [phone]     [address] [place] [address] [address] [place] [address]   [phone]     [address] [place] [address]   [phone]     [address] [facility] [place] [address]   [phone]     [address] [place] [address]   [phone]     [address] [place] [address]   [phone]     [address] [facility] [address]   [place] [address]   [phone]     [address] [facility]   [place] [address]   [phone]             How to find reasonable shoes          The correct width    Correct Fitting    Correct Length      Foot Distortion    Posture Distortion                          Torsional Rigidity      Grasp behind the heel and underneath the foot and twist      Bad    Excessive torsion/twist in midfoot     Less torsion/twist in midfoot is better                   Heel Counter Rigidity      Grasp just above   midsole and squeeze      Bad    Soft heel counter      Good    Rigid Heel Counter      Flexion Rigidity      Grasp shoe and bend from forefoot to rearfoot            \

## 2025-07-31 NOTE — PROGRESS NOTES
Chief Complaint   Patient presents with    RECHECK     Lateral Right ankle pain starting 10/2024; DOS: 4/12/2023~Open reduction and internal fixation of right ankle with syndesmosis repair; XR R ankle 7/31/2025; LOV 6/1/2023     HPI:  Retired .      History of osteopenia and bilateral wrist fractures after a fall on ice.    ORIF right ankle fracture 4/12/23 by [facility].  She has pain often times in her right hip sciatica pain that worsens as she tightens her hip.  It sometimes goes down to her ankle.  She wants to be certain that it is not coming from her ankle.  She denies any prominence about the ankle.  She denies any weakness or limited range of motion.  Ache in the lateral leg proximal to the ankle fracture.  Denies edema.    ROS:  10 point ROS neg other than the symptoms noted above in the HPI.    Patient Active Problem List   Diagnosis    Mixed hyperlipidemia    Tobacco abuse disorder    CARDIOVASCULAR SCREENING; LDL GOAL LESS THAN 160    TITA (generalized anxiety disorder)    Mild episode of recurrent major depressive disorder    Benign essential hypertension    Hypertension goal BP (blood pressure) < 140/90    Dyshydrosis    ASCUS with positive high risk HPV cervical    Family history of colon cancer    Menopausal syndrome (hot flashes)    Family history of ischemic heart disease       PAST MEDICAL HISTORY:   Past Medical History:   Diagnosis Date    Abnormal Pap smear of cervix 01/22/2020    see problem list    Benign essential hypertension 05/18/2017    GENERALIZED ANXIETY DIS 05/15/2007    Hyperlipidemia with target LDL less than 130 11/06/2013    Hypertension goal BP (blood pressure) < 140/90 05/18/2017    Major depressive disorder, recurrent episode, mild 12/09/2015    Migraine, unspecified, without mention of intractable migraine without mention of status migrainosus     Moderate Depression [296.32] 08/19/2009    Premenstrual tension syndromes     PMDS on Saraf[place] and doing very well.     Tobacco abuse 11/06/2013        PAST SURGICAL HISTORY:   Past Surgical History:   Procedure Laterality Date    COLONOSCOPY N/A 2/9/2015    Procedure: COLONOSCOPY;  Surgeon: Ritesh Chiu MD;  Location:  GI     HYSTEROSCOPY, SURGICAL; W/ ENDOMETRIAL ABLATION, ANY METHOD  12/18/09    OPEN REDUCTION INTERNAL FIXATION ANKLE Right 4/12/2023    Procedure: OPEN REDUCTION INTERNAL FIXATION right ankle;  Surgeon: Bruce Galicia DPM;  Location:  OR    [facility] ANESTH,NOSE,SINUS SURGERY  2000    [facility] RESEC HEAD OF PHALANX,TOE  2003        MEDICATIONS:   Current Outpatient Medications:     alendronate (FOSAMAX) 35 MG tablet, Take 1 tablet (35 mg) by mouth every 7 days. Appointment required for further refills., Disp: 4 tablet, Rfl: 11    atorvastatin (LIPITOR) 10 MG tablet, Take 1 tablet (10 mg) by mouth daily., Disp: 90 tablet, Rfl: 4    buPROPion (WELLBUTRIN XL) 150 MG 24 hr tablet, Take 1 tablet (150 mg) by mouth every morning., Disp: 90 tablet, Rfl: 1    losartan (COZAAR) 100 MG tablet, Take 1 tablet (100 mg) by mouth daily., Disp: 90 tablet, Rfl: 4    sertraline (ZOLOFT) 100 MG tablet, Take 1 tablet (100 mg) by mouth daily., Disp: 90 tablet, Rfl: 4    carvedilol (COREG) 3.125 MG tablet, Take 1 tablet (3.125 mg) by mouth 2 times daily (with meals). (Patient not taking: Reported on 7/31/2025), Disp: 180 tablet, Rfl: 3     ALLERGIES:    Allergies   Allergen Reactions    No Known Allergies     Ragweeds         SOCIAL HISTORY:   Social History     Socioeconomic History    Marital status:      Spouse name: Delbert    Number of children: 3    Years of education: 15    Highest education level: Not on file   Occupational History    Occupation:      Employer: [employer]   Tobacco Use    Smoking status: Former     Current packs/day: 0.00     Types: Cigarettes     Start date: 1/1/2010     Quit date: 9/1/2021     Years since quitting: 3.9    Smokeless tobacco: Never    Tobacco comments:     I QUIT!!    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 Yes   Social History Narrative     from , live separately for years. Has male friend     Social Drivers of Health     Financial Resource Strain: Low Risk  (1/27/2025)    Financial Resource Strain     Within the past 12 months, have you or your family members you live with been unable to get utilities (heat, electricity) when it was really needed?: No   Food Insecurity: Low Risk  (1/27/2025)    Food Insecurity     Within the past 12 months, did you worry that your food would run out before you got money to buy more?: No     Within the past 12 months, did the food you bought just not last and you didn t have money to get more?: No   Transportation Needs: Low Risk  (1/27/2025)    Transportation Needs     Within the past 12 months, has lack of transportation kept you from medical appointments, getting your medicines, non-medical meetings or appointments, work, or from getting things that you need?: No   Physical Activity: Insufficiently Active (1/27/2025)    Exercise Vital Sign     Days of Exercise per Week: 1 day     Minutes of Exercise per Session: 10 min   Stress: Stress Concern Present (1/27/2025)     [facility] of Occupational Health - Occupational Stress Questionnaire     Feeling of Stress : Rather much   Social Connections: Unknown (2025)    Social Connection and Isolation Panel [NHANES]     Frequency of Communication with Friends and Family: Not on file     Frequency of Social Gatherings with Friends and Family: Once a week     Attends Evangelical Services: Not on file     Active Member of Clubs or Organizations: Not on file     Attends Club or Organization Meetings: Not on file     Marital Status: Not on file   Interpersonal Safety: Low Risk  (2025)    Interpersonal Safety     Do you feel physically and emotionally safe where you currently live?: Yes     Within the past 12 months, have you been hit, slapped, kicked or otherwise physically hurt by someone?: No     Within the past 12 months, have you been humiliated or emotionally abused in other ways by your partner or ex-partner?: No   Housing Stability: High Risk (2025)    Housing Stability     Do you have housing? : No     Are you worried about losing your housing?: No        FAMILY HISTORY:   Family History   Problem Relation Age of Onset    Myocardial Infarction Father 40         at age 40    Hypertension Father          at 40.  Heart Attack    Lipids Father     C.A.D. Father          at age 40 from an MI    Arthritis Mother     Eye Disorder Mother         glaucoma    Genitourinary Problems Mother         hysterectomy    Hypertension Mother     Colon Cancer Mother 80    Liver Cancer Mother 80    Hyperlipidemia Mother     Cardiovascular Maternal Grandmother     Eye Disorder Maternal Grandmother         cataracts    Heart Disease Maternal Grandmother     Hypertension Maternal Grandmother     Cardiovascular Maternal Grandfather     Hypertension Maternal Grandfather     Cardiovascular Paternal Grandmother     Hypertension Paternal Grandmother     Cardiovascular Paternal Grandfather     Hypertension Paternal Grandfather      "Musculoskeletal Disorder Paternal Uncle         MS    Heart Failure Maternal Aunt 85        EXAM:Vitals: Ht 1.69 m (5' 6.54\")   Wt 74.8 kg (165 lb)   BMI 26.21 kg/m    BMI= Body mass index is 26.21 kg/m .    General appearance: Patient is alert and fully cooperative with history & exam.  No sign of distress is noted during the visit.     Psychiatric: Affect is pleasant & appropriate.  Patient appears motivated to improve health.     Respiratory: Breathing is regular & unlabored while sitting.     HEENT: Hearing is intact to spoken word.  Speech is clear.  No gross evidence of visual impairment that would impact ambulation.     Vascular: DP & PT pulses are intact & regular bilaterally.  No significant edema or varicosities noted.  CFT and skin temperature is normal to both lower extremities.     Neurologic: Lower extremity sensation is intact to light touch.  No evidence of weakness or contracture in the lower extremities.  No evidence of neuropathy.    Dermatologic: Skin is intact to both lower extremities with adequate texture, turgor and tone about the integument.  No paronychia or evidence of soft tissue infection is noted.     Musculoskeletal: Patient is ambulatory with regular shoe gear.  There is no prominence of hardware.  Ankle is quite mobile  but it is limited in ankle joint dorsiflexion 0 degrees of ankle joint dorsiflexion slightly more than knee flexed.  No obvious instability.  There may be subtle crepitus through the right ankle through range of motion but overall it appears supple.  No obvious instability about the syndesmosis or widening medial to lateral malleolus.  When flexing her hip she has reproduction of symptoms in her sciatic nerve on her lateral right hip and glutes.  No weakness is noted.  Normal reflexes equal bilateral.    Radiographs 3 views right ankle 7/31/2025 demonstrate no complication of hardware.  Tight rope through the syndesmosis noted.    DEXA scan 11/21 demonstrates "   right hip -2.1   left hip -1.4   lumbar spine -1.6    ASSESSMENT:       ICD-10-CM    1. Sciatica of right side  M54.31 Physical Therapy  Referral      2. Equinus contracture of right ankle  M24.571       3. Abnormality of gait  R26.9 Physical Therapy  Referral           PLAN:  Reviewed patient's chart in [facility].      4/10/2023   Interpreted radiographs demonstrating unstable ankle fracture with posterior malleolus and lateral malleolus components.  There is shortening of the fibula fracture and a spiral oblique fashion.  Recommend open reduction internal fixation to bring the fibula back out to length and reduced the ankle mortise.  Also to provide stability of the tib-fib syndesmosis and ankle joint ankle mortise.    Recommend nonweightbearing at this time to help reduce discomfort.  Refilled Percocet for her.  She may also take her hydroxyzine in addition to the Percocet but most of her pain should be managed by nonweightbearing.  Recommend compression during the day and keep the ankle fracture boot in place day and night.  Palpable crepitus is noted about the fracture.    4/26/2023  All sutures were removed today.  Compression dressing applied  Placed back in a fracture boot  Can begin short periods of weightbearing with a fracture boot 15 minutes per 60 minutes.  Remain in the fracture boot even during rest and sleep and follow-up in 2 weeks.  Can begin bathing but no soaking or submersion.  She has discontinued pain medication    5/10/2023  Order to begin physical therapy she would like to do this in [place]  She may come out of the fracture boot for rest and sleep and for very short periods of activity in a sturdy shoe as tolerated but stay in the fracture boot for any significant long periods of standing or walking  Encourage compression sock as tolerated  Follow-up again at 6 weeks postop and repeat imaging at that time likely to progress out of the fracture boot  completely.    6/1/2023  Obtained and interpreted radiographs  Start ankle sports brace and return to all activities   May progress out of the fracture boot as tolerated and stay in sturdy shoes.  Would encourage an ankle sports brace until strength and proprioception is equal bilateral.  Do not stop physical therapy until strength and proprioception is equal bilateral  Follow-up again in 1 month  Continue compression or ankle sports brace during the day  No heavy lifting or aggressive activities or activities on uneven ground until strength and proprioception is equal    7/31/2025  Placed order to begin physical therapy.  She would like to do this in [place].  She does have some symptoms of sciatica and is quite tight in her ankle.    Discussed removing the hardware and what expectations would be appropriate.  This may or may not address her symptoms.  I suspect most of her symptoms are associated with sciatica and ankle equinus.  She may be developing some degenerative change after the ankle fracture but overall her ankle mortise is in good repair.      Bruce Galicia DPM

## 2025-07-31 NOTE — LETTER
7/31/2025      Lorna Gorman  [address] [address] [facility] [address]      Dear Colleague,    Thank you for referring your patient, Lorna Gorman, to the [facility]. Please see a copy of my visit note below.    Chief Complaint   Patient presents with     RECHECK     Lateral Right ankle pain starting 10/2024; DOS: 4/12/2023~Open reduction and internal fixation of right ankle with syndesmosis repair; XR R ankle 7/31/2025; LOV 6/1/2023     HPI:  Retired .      History of osteopenia and bilateral wrist fractures after a fall on ice.    ORIF right ankle fracture 4/12/23 by [facility].  She has pain often times in her right hip sciatica pain that worsens as she tightens her hip.  It sometimes goes down to her ankle.  She wants to be certain that it is not coming from her ankle.  She denies any prominence about the ankle.  She denies any weakness or limited range of motion.  Ache in the lateral leg proximal to the ankle fracture.  Denies edema.    ROS:  10 point ROS neg other than the symptoms noted above in the HPI.    Patient Active Problem List   Diagnosis     Mixed hyperlipidemia     Tobacco abuse disorder     CARDIOVASCULAR SCREENING; LDL GOAL LESS THAN 160     TITA (generalized anxiety disorder)     Mild episode of recurrent major depressive disorder     Benign essential hypertension     Hypertension goal BP (blood pressure) < 140/90     Dyshydrosis     ASCUS with positive high risk HPV cervical     Family history of colon cancer     Menopausal syndrome (hot flashes)     Family history of ischemic heart disease       PAST MEDICAL HISTORY:   Past Medical History:   Diagnosis Date     Abnormal Pap smear of cervix 01/22/2020    see problem list     Benign essential hypertension 05/18/2017     GENERALIZED ANXIETY DIS 05/15/2007     Hyperlipidemia with target LDL less than 130 11/06/2013     Hypertension goal BP (blood pressure) < 140/90 05/18/2017     Major depressive disorder, recurrent  episode, mild 12/09/2015     Migraine, unspecified, without mention of intractable migraine without mention of status migrainosus      Moderate Depression [296.32] 08/19/2009     Premenstrual tension syndromes     PMDS on Sarafem and doing very well.     Tobacco abuse 11/06/2013        PAST SURGICAL HISTORY:   Past Surgical History:   Procedure Laterality Date     COLONOSCOPY N/A 2/9/2015    Procedure: COLONOSCOPY;  Surgeon: Ritesh Chiu MD;  Location:  GI     [facility] HYSTEROSCOPY, SURGICAL; W/ ENDOMETRIAL ABLATION, ANY METHOD  12/18/09     OPEN REDUCTION INTERNAL FIXATION ANKLE Right 4/12/2023    Procedure: OPEN REDUCTION INTERNAL FIXATION right ankle;  Surgeon: Bruce Galicia DPM;  Location:  OR     [facility] ANESTH,NOSE,SINUS SURGERY  2000     [facility] RESEC HEAD OF PHALANX,TOE  2003        MEDICATIONS:   Current Outpatient Medications:      alendronate (FOSAMAX) 35 MG tablet, Take 1 tablet (35 mg) by mouth every 7 days. Appointment required for further refills., Disp: 4 tablet, Rfl: 11     atorvastatin (LIPITOR) 10 MG tablet, Take 1 tablet (10 mg) by mouth daily., Disp: 90 tablet, Rfl: 4     buPROPion (WELLBUTRIN XL) 150 MG 24 hr tablet, Take 1 tablet (150 mg) by mouth every morning., Disp: 90 tablet, Rfl: 1     losartan (COZAAR) 100 MG tablet, Take 1 tablet (100 mg) by mouth daily., Disp: 90 tablet, Rfl: 4     sertraline (ZOLOFT) 100 MG tablet, Take 1 tablet (100 mg) by mouth daily., Disp: 90 tablet, Rfl: 4     carvedilol (COREG) 3.125 MG tablet, Take 1 tablet (3.125 mg) by mouth 2 times daily (with meals). (Patient not taking: Reported on 7/31/2025), Disp: 180 tablet, Rfl: 3     ALLERGIES:    Allergies   Allergen Reactions     No Known Allergies      Ragweeds         SOCIAL HISTORY:   Social History     Socioeconomic History     Marital status:      Spouse name: Delbert     Number of children: 3     Years of education: 15     Highest education level: Not on file   Occupational History     Occupation: Pose  attendant     Employer: [employer]   Tobacco Use     Smoking status: Former     Current packs/day: 0.00     Types: Cigarettes     Start date: 1/1/2010     Quit date: 9/1/2021     Years since quitting: 3.9     Smokeless tobacco: Never     Tobacco comments:     I QUIT!!   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 Yes   Social History Narrative     from , live separately for years. Has male friend     Social Drivers of Health     Financial Resource Strain: Low Risk  (1/27/2025)    Financial Resource Strain      Within the past 12 months, have you or your family members you live with been unable to get utilities (heat, electricity) when it was really needed?: No   Food Insecurity: Low Risk  (1/27/2025)    Food Insecurity      Within the past 12 months, did you worry that your food would run out before you got money to buy more?: No      Within the past 12 months, did the food you bought just not last and you didn t have money to get more?: No   Transportation Needs: Low Risk  (1/27/2025)    Transportation Needs      Within the past 12 months, has lack of transportation kept you from medical appointments, getting your medicines,  non-medical meetings or appointments, work, or from getting things that you need?: No   Physical Activity: Insufficiently Active (2025)    Exercise Vital Sign      Days of Exercise per Week: 1 day      Minutes of Exercise per Session: 10 min   Stress: Stress Concern Present (2025)    [employer] - Occupational Stress Questionnaire      Feeling of Stress : Rather much   Social Connections: Unknown (2025)    Social Connection and Isolation Panel [NHANES]      Frequency of Communication with Friends and Family: Not on file      Frequency of Social Gatherings with Friends and Family: Once a week      Attends Zoroastrianism Services: Not on file      Active Member of Clubs or Organizations: Not on file      Attends Club or Organization Meetings: Not on file      Marital Status: Not on file   Interpersonal Safety: Low Risk  (2025)    Interpersonal Safety      Do you feel physically and emotionally safe where you currently live?: Yes      Within the past 12 months, have you been hit, slapped, kicked or otherwise physically hurt by someone?: No      Within the past 12 months, have you been humiliated or emotionally abused in other ways by your partner or ex-partner?: No   Housing Stability: High Risk (2025)    Housing Stability      Do you have housing? : No      Are you worried about losing your housing?: No        FAMILY HISTORY:   Family History   Problem Relation Age of Onset     Myocardial Infarction Father 40         at age 40     Hypertension Father          at 40.  Heart Attack     Lipids Father      C.A.D. Father          at age 40 from an MI     Arthritis Mother      Eye Disorder Mother         glaucoma     Genitourinary Problems Mother         hysterectomy     Hypertension Mother      Colon Cancer Mother 80     Liver Cancer Mother 80     Hyperlipidemia Mother      Cardiovascular Maternal Grandmother      Eye Disorder Maternal Grandmother          "cataracts     Heart Disease Maternal Grandmother      Hypertension Maternal Grandmother      Cardiovascular Maternal Grandfather      Hypertension Maternal Grandfather      Cardiovascular Paternal Grandmother      Hypertension Paternal Grandmother      Cardiovascular Paternal Grandfather      Hypertension Paternal Grandfather      Musculoskeletal Disorder Paternal Uncle         MS     Heart Failure Maternal Aunt 85        EXAM:Vitals: Ht 1.69 m (5' 6.54\")   Wt 74.8 kg (165 lb)   BMI 26.21 kg/m    BMI= Body mass index is 26.21 kg/m .    General appearance: Patient is alert and fully cooperative with history & exam.  No sign of distress is noted during the visit.     Psychiatric: Affect is pleasant & appropriate.  Patient appears motivated to improve health.     Respiratory: Breathing is regular & unlabored while sitting.     HEENT: Hearing is intact to spoken word.  Speech is clear.  No gross evidence of visual impairment that would impact ambulation.     Vascular: DP & PT pulses are intact & regular bilaterally.  No significant edema or varicosities noted.  CFT and skin temperature is normal to both lower extremities.     Neurologic: Lower extremity sensation is intact to light touch.  No evidence of weakness or contracture in the lower extremities.  No evidence of neuropathy.    Dermatologic: Skin is intact to both lower extremities with adequate texture, turgor and tone about the integument.  No paronychia or evidence of soft tissue infection is noted.     Musculoskeletal: Patient is ambulatory with regular shoe gear.  There is no prominence of hardware.  Ankle is quite mobile  but it is limited in ankle joint dorsiflexion 0 degrees of ankle joint dorsiflexion slightly more than knee flexed.  No obvious instability.  There may be subtle crepitus through the right ankle through range of motion but overall it appears supple.  No obvious instability about the syndesmosis or widening medial to lateral malleolus.  " When flexing her hip she has reproduction of symptoms in her sciatic nerve on her lateral right hip and glutes.  No weakness is noted.  Normal reflexes equal bilateral.    Radiographs 3 views right ankle 7/31/2025 demonstrate no complication of hardware.  Tight rope through the syndesmosis noted.    DEXA scan 11/21 demonstrates   right hip -2.1   left hip -1.4   lumbar spine -1.6    ASSESSMENT:       ICD-10-CM    1. Sciatica of right side  M54.31 Physical Therapy  Referral      2. Equinus contracture of right ankle  M24.571       3. Abnormality of gait  R26.9 Physical Therapy  Referral           PLAN:  Reviewed patient's chart in [facility].      4/10/2023   Interpreted radiographs demonstrating unstable ankle fracture with posterior malleolus and lateral malleolus components.  There is shortening of the fibula fracture and a spiral oblique fashion.  Recommend open reduction internal fixation to bring the fibula back out to length and reduced the ankle mortise.  Also to provide stability of the tib-fib syndesmosis and ankle joint ankle mortise.    Recommend nonweightbearing at this time to help reduce discomfort.  Refilled Percocet for her.  She may also take her hydroxyzine in addition to the Percocet but most of her pain should be managed by nonweightbearing.  Recommend compression during the day and keep the ankle fracture boot in place day and night.  Palpable crepitus is noted about the fracture.    4/26/2023  All sutures were removed today.  Compression dressing applied  Placed back in a fracture boot  Can begin short periods of weightbearing with a fracture boot 15 minutes per 60 minutes.  Remain in the fracture boot even during rest and sleep and follow-up in 2 weeks.  Can begin bathing but no soaking or submersion.  She has discontinued pain medication    5/10/2023  Order to begin physical therapy she would like to do this in [place]  She may come out of the fracture boot for rest and sleep and  for very short periods of activity in a sturdy shoe as tolerated but stay in the fracture boot for any significant long periods of standing or walking  Encourage compression sock as tolerated  Follow-up again at 6 weeks postop and repeat imaging at that time likely to progress out of the fracture boot completely.    6/1/2023  Obtained and interpreted radiographs  Start ankle sports brace and return to all activities   May progress out of the fracture boot as tolerated and stay in sturdy shoes.  Would encourage an ankle sports brace until strength and proprioception is equal bilateral.  Do not stop physical therapy until strength and proprioception is equal bilateral  Follow-up again in 1 month  Continue compression or ankle sports brace during the day  No heavy lifting or aggressive activities or activities on uneven ground until strength and proprioception is equal    7/31/2025  Placed order to begin physical therapy.  She would like to do this in [place].  She does have some symptoms of sciatica and is quite tight in her ankle.    Discussed removing the hardware and what expectations would be appropriate.  This may or may not address her symptoms.  I suspect most of her symptoms are associated with sciatica and ankle equinus.  She may be developing some degenerative change after the ankle fracture but overall her ankle mortise is in good repair.      Bruce Galicia DPM            Again, thank you for allowing me to participate in the care of your patient.        Sincerely,        Bruce Galicia DPM    Electronically signed

## (undated) DEVICE — BNDG COBAN 6"X5YDS STERILE

## (undated) DEVICE — ESU PENCIL W/HOLSTER

## (undated) DEVICE — PACK EXTREMITY SOP15EXFSD

## (undated) DEVICE — DRSG GAUZE 4X4" TRAY

## (undated) DEVICE — DRILL BIT ARTHREX BB TAK MTP AR-13226

## (undated) DEVICE — DRILL BIT ARTHREX CALIBRATED 2.5MM AR-8943-13

## (undated) DEVICE — SPLINT PADDED PRECUTS 5X30" SS-5PC

## (undated) DEVICE — IMPLANTABLE DEVICE: Type: IMPLANTABLE DEVICE | Site: ANKLE | Status: NON-FUNCTIONAL

## (undated) DEVICE — SPLINT FIBERGLASS 4X30" PRE-CUT RESIN 76430

## (undated) DEVICE — BNDG ELASTIC 6"X5YDS UNSTERILE 6611-60

## (undated) DEVICE — SU VICRYL 4-0 PS-2 27" UND J426H

## (undated) DEVICE — BNDG ELASTIC 4"X5YDS UNSTERILE 6611-40

## (undated) DEVICE — DRILL BIT ARTHREX 2.0MM AR-8943-16

## (undated) DEVICE — SYR 10ML FINGER CONTROL W/O NDL 309695

## (undated) DEVICE — SU VICRYL 3-0 PS-2 27" UND J427H

## (undated) DEVICE — BLADE KNIFE SURG 15 371115

## (undated) DEVICE — GLOVE BIOGEL PI ULTRATOUCH G SZ 8.0 42180

## (undated) DEVICE — SU VICRYL 0 CP-1 27" UND J267H

## (undated) DEVICE — CAST PADDING 4" WEBRIL UNSTERILE

## (undated) DEVICE — DRSG KERLIX 4 1/2"X4YDS ROLL 6730

## (undated) DEVICE — PREP CHLORAPREP 26ML TINTED ORANGE  260815

## (undated) DEVICE — NDL ECLIPSE 22GA 1.5"

## (undated) DEVICE — DRAPE C-ARM MINI 5423

## (undated) DEVICE — SU PROLENE 4-0 PS-2 18" 8682G

## (undated) RX ORDER — ONDANSETRON 2 MG/ML
INJECTION INTRAMUSCULAR; INTRAVENOUS
Status: DISPENSED
Start: 2023-04-12

## (undated) RX ORDER — FENTANYL CITRATE 50 UG/ML
INJECTION, SOLUTION INTRAMUSCULAR; INTRAVENOUS
Status: DISPENSED
Start: 2023-04-12

## (undated) RX ORDER — LIDOCAINE HYDROCHLORIDE 10 MG/ML
INJECTION, SOLUTION INFILTRATION; PERINEURAL
Status: DISPENSED
Start: 2023-04-12

## (undated) RX ORDER — DEXAMETHASONE SODIUM PHOSPHATE 10 MG/ML
INJECTION, SOLUTION INTRAMUSCULAR; INTRAVENOUS
Status: DISPENSED
Start: 2023-04-12

## (undated) RX ORDER — PROPOFOL 10 MG/ML
INJECTION, EMULSION INTRAVENOUS
Status: DISPENSED
Start: 2023-04-12

## (undated) RX ORDER — BUPIVACAINE HYDROCHLORIDE AND EPINEPHRINE 5; 5 MG/ML; UG/ML
INJECTION, SOLUTION EPIDURAL; INTRACAUDAL; PERINEURAL
Status: DISPENSED
Start: 2023-04-12